# Patient Record
Sex: MALE | Race: WHITE | Employment: OTHER
[De-identification: names, ages, dates, MRNs, and addresses within clinical notes are randomized per-mention and may not be internally consistent; named-entity substitution may affect disease eponyms.]

---

## 2017-01-30 ENCOUNTER — OFFICE VISIT (OUTPATIENT)
Dept: INTERNAL MEDICINE | Facility: CLINIC | Age: 42
End: 2017-01-30

## 2017-01-30 VITALS
DIASTOLIC BLOOD PRESSURE: 64 MMHG | OXYGEN SATURATION: 96 % | BODY MASS INDEX: 32.78 KG/M2 | SYSTOLIC BLOOD PRESSURE: 124 MMHG | WEIGHT: 229 LBS | HEIGHT: 70 IN | HEART RATE: 94 BPM

## 2017-01-30 DIAGNOSIS — F31.9 BIPOLAR 1 DISORDER (HCC): Chronic | ICD-10-CM

## 2017-01-30 DIAGNOSIS — Z00.00 HEALTH CARE MAINTENANCE: ICD-10-CM

## 2017-01-30 DIAGNOSIS — L03.032: Primary | ICD-10-CM

## 2017-01-30 DIAGNOSIS — M51.26 DISPLACEMENT OF LUMBAR INTERVERTEBRAL DISC WITHOUT MYELOPATHY: ICD-10-CM

## 2017-01-30 DIAGNOSIS — M79.675 TOE PAIN, LEFT: ICD-10-CM

## 2017-01-30 PROCEDURE — G8427 DOCREV CUR MEDS BY ELIG CLIN: HCPCS | Performed by: NURSE PRACTITIONER

## 2017-01-30 PROCEDURE — 99213 OFFICE O/P EST LOW 20 MIN: CPT | Performed by: NURSE PRACTITIONER

## 2017-01-30 PROCEDURE — G8484 FLU IMMUNIZE NO ADMIN: HCPCS | Performed by: NURSE PRACTITIONER

## 2017-01-30 PROCEDURE — 4004F PT TOBACCO SCREEN RCVD TLK: CPT | Performed by: NURSE PRACTITIONER

## 2017-01-30 PROCEDURE — G8419 CALC BMI OUT NRM PARAM NOF/U: HCPCS | Performed by: NURSE PRACTITIONER

## 2017-01-30 RX ORDER — TERBINAFINE HYDROCHLORIDE 250 MG/1
250 TABLET ORAL DAILY
Qty: 90 TABLET | Refills: 0 | Status: SHIPPED | OUTPATIENT
Start: 2017-01-30 | End: 2017-01-30 | Stop reason: CLARIF

## 2017-01-30 ASSESSMENT — PATIENT HEALTH QUESTIONNAIRE - PHQ9
2. FEELING DOWN, DEPRESSED OR HOPELESS: 0
SUM OF ALL RESPONSES TO PHQ9 QUESTIONS 1 & 2: 1
1. LITTLE INTEREST OR PLEASURE IN DOING THINGS: 1
SUM OF ALL RESPONSES TO PHQ QUESTIONS 1-9: 1

## 2017-01-30 ASSESSMENT — ENCOUNTER SYMPTOMS: BACK PAIN: 1

## 2017-02-16 ENCOUNTER — HOSPITAL ENCOUNTER (OUTPATIENT)
Dept: PAIN MANAGEMENT | Age: 42
Discharge: HOME | End: 2017-02-16
Attending: ANESTHESIOLOGY | Admitting: ANESTHESIOLOGY
Payer: MEDICARE

## 2017-02-16 VITALS — DIASTOLIC BLOOD PRESSURE: 78 MMHG | HEART RATE: 98 BPM | SYSTOLIC BLOOD PRESSURE: 138 MMHG

## 2017-02-16 DIAGNOSIS — M51.26 DISPLACEMENT OF LUMBAR INTERVERTEBRAL DISC WITHOUT MYELOPATHY: ICD-10-CM

## 2017-02-16 DIAGNOSIS — R52 PAIN DISORDER: ICD-10-CM

## 2017-02-16 PROCEDURE — 99214 OFFICE O/P EST MOD 30 MIN: CPT

## 2017-02-16 RX ORDER — OXYCODONE HYDROCHLORIDE 5 MG/1
5 TABLET ORAL EVERY 8 HOURS PRN
Qty: 90 TABLET | Refills: 0 | Status: SHIPPED | OUTPATIENT
Start: 2017-02-20 | End: 2017-03-20 | Stop reason: SDUPTHER

## 2017-02-16 RX ORDER — GABAPENTIN 300 MG/1
300 CAPSULE ORAL EVERY 8 HOURS SCHEDULED
Qty: 90 CAPSULE | Refills: 0 | Status: SHIPPED | OUTPATIENT
Start: 2017-02-16 | End: 2017-03-20 | Stop reason: SDUPTHER

## 2017-02-16 RX ORDER — TERBINAFINE HYDROCHLORIDE 250 MG/1
250 TABLET ORAL DAILY
COMMUNITY
End: 2017-05-16

## 2017-02-16 RX ORDER — TIZANIDINE 4 MG/1
4 TABLET ORAL 2 TIMES DAILY
Qty: 60 TABLET | Refills: 0 | Status: SHIPPED | OUTPATIENT
Start: 2017-02-16 | End: 2017-02-17 | Stop reason: DRUGHIGH

## 2017-02-16 ASSESSMENT — PAIN SCALES - GENERAL: PAINLEVEL_OUTOF10: 3

## 2017-02-16 ASSESSMENT — PAIN DESCRIPTION - ORIENTATION: ORIENTATION: LOWER

## 2017-02-16 ASSESSMENT — PAIN DESCRIPTION - DESCRIPTORS: DESCRIPTORS: ACHING;BURNING;CONSTANT;STABBING

## 2017-02-16 ASSESSMENT — PAIN DESCRIPTION - ONSET: ONSET: GRADUAL

## 2017-02-16 ASSESSMENT — PAIN DESCRIPTION - PAIN TYPE: TYPE: CHRONIC PAIN

## 2017-02-16 ASSESSMENT — PAIN DESCRIPTION - FREQUENCY: FREQUENCY: CONTINUOUS

## 2017-02-16 ASSESSMENT — PAIN DESCRIPTION - PROGRESSION: CLINICAL_PROGRESSION: GRADUALLY IMPROVING

## 2017-02-16 ASSESSMENT — PAIN DESCRIPTION - LOCATION: LOCATION: BACK

## 2017-02-17 RX ORDER — TIZANIDINE 4 MG/1
4 TABLET ORAL 2 TIMES DAILY PRN
Qty: 60 TABLET | Refills: 0 | Status: SHIPPED
Start: 2017-02-17 | End: 2017-02-17 | Stop reason: DRUGHIGH

## 2017-02-17 RX ORDER — TIZANIDINE 4 MG/1
4 TABLET ORAL 2 TIMES DAILY PRN
Qty: 60 TABLET | Refills: 0 | Status: SHIPPED
Start: 2017-02-17 | End: 2017-04-19 | Stop reason: SDUPTHER

## 2017-03-20 ENCOUNTER — HOSPITAL ENCOUNTER (OUTPATIENT)
Dept: PAIN MANAGEMENT | Age: 42
Discharge: HOME | End: 2017-03-20
Payer: MEDICARE

## 2017-03-20 VITALS
BODY MASS INDEX: 31.5 KG/M2 | HEART RATE: 84 BPM | WEIGHT: 220 LBS | DIASTOLIC BLOOD PRESSURE: 80 MMHG | HEIGHT: 70 IN | RESPIRATION RATE: 20 BRPM | SYSTOLIC BLOOD PRESSURE: 124 MMHG | TEMPERATURE: 98.2 F

## 2017-03-20 DIAGNOSIS — M51.26 DISPLACEMENT OF LUMBAR INTERVERTEBRAL DISC WITHOUT MYELOPATHY: ICD-10-CM

## 2017-03-20 DIAGNOSIS — R52 PAIN DISORDER: ICD-10-CM

## 2017-03-20 PROCEDURE — 99214 OFFICE O/P EST MOD 30 MIN: CPT

## 2017-03-20 RX ORDER — GABAPENTIN 300 MG/1
300 CAPSULE ORAL EVERY 8 HOURS SCHEDULED
Qty: 90 CAPSULE | Refills: 0 | Status: SHIPPED | OUTPATIENT
Start: 2017-03-20 | End: 2017-04-19 | Stop reason: SDUPTHER

## 2017-03-20 RX ORDER — OXYCODONE HYDROCHLORIDE 5 MG/1
5 TABLET ORAL EVERY 8 HOURS PRN
Qty: 90 TABLET | Refills: 0 | Status: SHIPPED | OUTPATIENT
Start: 2017-03-22 | End: 2017-04-19 | Stop reason: SDUPTHER

## 2017-03-20 ASSESSMENT — PAIN DESCRIPTION - PAIN TYPE: TYPE: CHRONIC PAIN

## 2017-03-20 ASSESSMENT — PAIN DESCRIPTION - ORIENTATION: ORIENTATION: LOWER

## 2017-03-20 ASSESSMENT — PAIN DESCRIPTION - PROGRESSION: CLINICAL_PROGRESSION: NOT CHANGED

## 2017-03-20 ASSESSMENT — PAIN SCALES - GENERAL: PAINLEVEL_OUTOF10: 3

## 2017-03-20 ASSESSMENT — PAIN DESCRIPTION - FREQUENCY: FREQUENCY: CONTINUOUS

## 2017-03-20 ASSESSMENT — PAIN DESCRIPTION - LOCATION: LOCATION: BACK

## 2017-03-20 ASSESSMENT — PAIN DESCRIPTION - DESCRIPTORS: DESCRIPTORS: ACHING;BURNING;CONSTANT;RADIATING

## 2017-03-20 ASSESSMENT — PAIN DESCRIPTION - ONSET: ONSET: ON-GOING

## 2017-04-19 ENCOUNTER — HOSPITAL ENCOUNTER (OUTPATIENT)
Dept: PAIN MANAGEMENT | Age: 42
Discharge: HOME | End: 2017-04-19
Payer: MEDICARE

## 2017-04-19 VITALS
DIASTOLIC BLOOD PRESSURE: 74 MMHG | TEMPERATURE: 98 F | HEIGHT: 70 IN | SYSTOLIC BLOOD PRESSURE: 136 MMHG | BODY MASS INDEX: 32.21 KG/M2 | RESPIRATION RATE: 18 BRPM | HEART RATE: 80 BPM | WEIGHT: 225 LBS

## 2017-04-19 DIAGNOSIS — M51.26 DISPLACEMENT OF LUMBAR INTERVERTEBRAL DISC WITHOUT MYELOPATHY: ICD-10-CM

## 2017-04-19 DIAGNOSIS — R52 PAIN DISORDER: ICD-10-CM

## 2017-04-19 PROCEDURE — 99214 OFFICE O/P EST MOD 30 MIN: CPT

## 2017-04-19 RX ORDER — TIZANIDINE 4 MG/1
4 TABLET ORAL 2 TIMES DAILY PRN
Qty: 60 TABLET | Refills: 0 | Status: SHIPPED | OUTPATIENT
Start: 2017-04-19 | End: 2017-04-19 | Stop reason: ALTCHOICE

## 2017-04-19 RX ORDER — OXYCODONE HYDROCHLORIDE 5 MG/1
5 TABLET ORAL EVERY 8 HOURS PRN
Qty: 90 TABLET | Refills: 0 | Status: SHIPPED | OUTPATIENT
Start: 2017-04-21 | End: 2017-05-16 | Stop reason: SDUPTHER

## 2017-04-19 RX ORDER — GABAPENTIN 300 MG/1
300 CAPSULE ORAL EVERY 8 HOURS SCHEDULED
Qty: 90 CAPSULE | Refills: 0 | Status: SHIPPED | OUTPATIENT
Start: 2017-04-21 | End: 2017-05-16 | Stop reason: SDUPTHER

## 2017-04-19 RX ORDER — CYCLOBENZAPRINE HCL 10 MG
10 TABLET ORAL 2 TIMES DAILY PRN
Qty: 60 TABLET | Refills: 6 | Status: SHIPPED | OUTPATIENT
Start: 2017-04-19 | End: 2017-06-19 | Stop reason: SDUPTHER

## 2017-04-19 ASSESSMENT — ENCOUNTER SYMPTOMS
COUGH: 0
SHORTNESS OF BREATH: 0
CONSTIPATION: 0
BACK PAIN: 1

## 2017-04-19 ASSESSMENT — PAIN DESCRIPTION - DESCRIPTORS: DESCRIPTORS: BURNING;ACHING;CONSTANT;SHARP

## 2017-04-19 ASSESSMENT — PAIN DESCRIPTION - ONSET: ONSET: ON-GOING

## 2017-04-19 ASSESSMENT — PAIN SCALES - GENERAL: PAINLEVEL_OUTOF10: 5

## 2017-04-19 ASSESSMENT — PAIN DESCRIPTION - PAIN TYPE: TYPE: CHRONIC PAIN

## 2017-04-19 ASSESSMENT — PAIN DESCRIPTION - PROGRESSION: CLINICAL_PROGRESSION: GRADUALLY WORSENING

## 2017-04-19 ASSESSMENT — PAIN DESCRIPTION - ORIENTATION: ORIENTATION: LOWER

## 2017-04-19 ASSESSMENT — PAIN DESCRIPTION - LOCATION: LOCATION: BACK

## 2017-05-16 ENCOUNTER — HOSPITAL ENCOUNTER (OUTPATIENT)
Dept: PAIN MANAGEMENT | Age: 42
Discharge: HOME | End: 2017-05-16
Payer: MEDICARE

## 2017-05-16 VITALS
RESPIRATION RATE: 18 BRPM | TEMPERATURE: 99.1 F | HEART RATE: 114 BPM | SYSTOLIC BLOOD PRESSURE: 131 MMHG | DIASTOLIC BLOOD PRESSURE: 70 MMHG

## 2017-05-16 DIAGNOSIS — R52 PAIN DISORDER: ICD-10-CM

## 2017-05-16 DIAGNOSIS — M51.26 DISPLACEMENT OF LUMBAR INTERVERTEBRAL DISC WITHOUT MYELOPATHY: ICD-10-CM

## 2017-05-16 PROCEDURE — 99214 OFFICE O/P EST MOD 30 MIN: CPT

## 2017-05-16 RX ORDER — OXYCODONE HYDROCHLORIDE 5 MG/1
5 TABLET ORAL EVERY 8 HOURS PRN
Qty: 90 TABLET | Refills: 0 | Status: SHIPPED | OUTPATIENT
Start: 2017-05-21 | End: 2017-06-19 | Stop reason: SDUPTHER

## 2017-05-16 RX ORDER — GABAPENTIN 300 MG/1
300 CAPSULE ORAL EVERY 8 HOURS SCHEDULED
Qty: 90 CAPSULE | Refills: 0 | Status: SHIPPED | OUTPATIENT
Start: 2017-05-21 | End: 2017-06-19 | Stop reason: SDUPTHER

## 2017-05-16 ASSESSMENT — PAIN DESCRIPTION - FREQUENCY: FREQUENCY: CONTINUOUS

## 2017-05-16 ASSESSMENT — PAIN DESCRIPTION - PROGRESSION: CLINICAL_PROGRESSION: NOT CHANGED

## 2017-05-16 ASSESSMENT — PAIN DESCRIPTION - LOCATION: LOCATION: BACK

## 2017-05-16 ASSESSMENT — PAIN DESCRIPTION - ONSET: ONSET: ON-GOING

## 2017-05-16 ASSESSMENT — PAIN DESCRIPTION - PAIN TYPE: TYPE: CHRONIC PAIN

## 2017-05-16 ASSESSMENT — PAIN DESCRIPTION - DIRECTION: RADIATING_TOWARDS: PAIN LOW BACK NONRADIATING

## 2017-05-16 ASSESSMENT — PAIN SCALES - GENERAL: PAINLEVEL_OUTOF10: 2

## 2017-05-16 ASSESSMENT — PAIN DESCRIPTION - ORIENTATION: ORIENTATION: LOWER

## 2017-06-19 ENCOUNTER — HOSPITAL ENCOUNTER (OUTPATIENT)
Dept: PAIN MANAGEMENT | Age: 42
Discharge: HOME | End: 2017-06-19
Payer: MEDICARE

## 2017-06-19 VITALS
RESPIRATION RATE: 18 BRPM | TEMPERATURE: 98.2 F | SYSTOLIC BLOOD PRESSURE: 121 MMHG | DIASTOLIC BLOOD PRESSURE: 84 MMHG | HEART RATE: 83 BPM

## 2017-06-19 DIAGNOSIS — R52 PAIN DISORDER: ICD-10-CM

## 2017-06-19 DIAGNOSIS — M51.26 DISPLACEMENT OF LUMBAR INTERVERTEBRAL DISC WITHOUT MYELOPATHY: ICD-10-CM

## 2017-06-19 PROCEDURE — 99215 OFFICE O/P EST HI 40 MIN: CPT

## 2017-06-19 PROCEDURE — 80307 DRUG TEST PRSMV CHEM ANLYZR: CPT

## 2017-06-19 RX ORDER — CYCLOBENZAPRINE HCL 10 MG
10 TABLET ORAL 2 TIMES DAILY PRN
Qty: 60 TABLET | Refills: 6 | Status: SHIPPED | OUTPATIENT
Start: 2017-06-20 | End: 2017-07-14 | Stop reason: SDUPTHER

## 2017-06-19 RX ORDER — GABAPENTIN 300 MG/1
300 CAPSULE ORAL EVERY 8 HOURS SCHEDULED
Qty: 90 CAPSULE | Refills: 0 | Status: SHIPPED | OUTPATIENT
Start: 2017-06-20 | End: 2017-07-14 | Stop reason: SDUPTHER

## 2017-06-19 RX ORDER — OXYCODONE HYDROCHLORIDE 5 MG/1
5 TABLET ORAL EVERY 8 HOURS PRN
Qty: 90 TABLET | Refills: 0 | Status: SHIPPED | OUTPATIENT
Start: 2017-06-20 | End: 2017-07-14 | Stop reason: SDUPTHER

## 2017-06-19 ASSESSMENT — PAIN DESCRIPTION - LOCATION: LOCATION: BACK

## 2017-06-19 ASSESSMENT — PAIN DESCRIPTION - PAIN TYPE: TYPE: CHRONIC PAIN

## 2017-06-19 ASSESSMENT — PAIN DESCRIPTION - PROGRESSION: CLINICAL_PROGRESSION: NOT CHANGED

## 2017-06-19 ASSESSMENT — ENCOUNTER SYMPTOMS
BACK PAIN: 1
SHORTNESS OF BREATH: 0
CONSTIPATION: 0
COUGH: 0

## 2017-06-19 ASSESSMENT — PAIN DESCRIPTION - ORIENTATION: ORIENTATION: LOWER

## 2017-06-19 ASSESSMENT — PAIN DESCRIPTION - ONSET: ONSET: ON-GOING

## 2017-06-19 ASSESSMENT — PAIN DESCRIPTION - FREQUENCY: FREQUENCY: CONTINUOUS

## 2017-06-19 ASSESSMENT — PAIN SCALES - GENERAL: PAINLEVEL_OUTOF10: 3

## 2017-06-23 LAB
6-ACETYLMORPHINE, UR: NOT DETECTED
7-AMINOCLONAZEPAM, URINE: NOT DETECTED
ALPHA-OH-ALPRAZ, URINE: NOT DETECTED
ALPRAZOLAM, URINE: NOT DETECTED
AMPHETAMINES, URINE: NOT DETECTED
BARBITURATES, URINE: NOT DETECTED
BENZOYLECGONINE, UR: NOT DETECTED
BUPRENORPHINE URINE: NOT DETECTED
CARISOPRODOL, UR: NOT DETECTED
CLONAZEPAM, URINE: NOT DETECTED
CODEINE, URINE: NOT DETECTED
CREATININE URINE: 294.4 MG/DL (ref 20–400)
DIAZEPAM, URINE: NOT DETECTED
EER PAIN MGT DRUG PANEL, HIGH RES/EMIT U: NORMAL
ETHYL GLUCURONIDE UR: NOT DETECTED
FENTANYL URINE: NOT DETECTED
HYDROCODONE, URINE: NOT DETECTED
HYDROMORPHONE, URINE: NOT DETECTED
LORAZEPAM, URINE: NOT DETECTED
MARIJUANA METAB, UR: NOT DETECTED
MDA, UR: NOT DETECTED
MDEA, EVE, UR: NOT DETECTED
MDMA URINE: NOT DETECTED
MEPERIDINE METAB, UR: NOT DETECTED
METHADONE, URINE: NOT DETECTED
METHAMPHETAMINE, URINE: NOT DETECTED
METHYLPHENIDATE: NOT DETECTED
MIDAZOLAM, URINE: NOT DETECTED
MORPHINE URINE: NOT DETECTED
NORBUPRENORPHINE, URINE: NOT DETECTED
NORDIAZEPAM, URINE: NOT DETECTED
NORFENTANYL, URINE: NOT DETECTED
NORHYDROCODONE, URINE: NOT DETECTED
NOROXYCODONE, URINE: PRESENT
NOROXYMORPHONE, URINE: PRESENT
OXAZEPAM, URINE: NOT DETECTED
OXYCODONE URINE: PRESENT
OXYMORPHONE, URINE: PRESENT
PAIN MGT DRUG PANEL, HI RES, UR: NORMAL
PCP,URINE: NOT DETECTED
PHENTERMINE, UR: NOT DETECTED
PROPOXYPHENE, URINE: NOT DETECTED
TAPENTADOL, URINE: NOT DETECTED
TAPENTADOL-O-SULFATE, URINE: NOT DETECTED
TEMAZEPAM, URINE: NOT DETECTED
TRAMADOL, URINE: NOT DETECTED
ZOLPIDEM, URINE: NOT DETECTED

## 2017-07-13 ENCOUNTER — OFFICE VISIT (OUTPATIENT)
Dept: INTERNAL MEDICINE | Age: 42
End: 2017-07-13
Payer: MEDICARE

## 2017-07-13 VITALS
HEART RATE: 95 BPM | SYSTOLIC BLOOD PRESSURE: 132 MMHG | DIASTOLIC BLOOD PRESSURE: 82 MMHG | HEIGHT: 70 IN | WEIGHT: 211 LBS | BODY MASS INDEX: 30.21 KG/M2

## 2017-07-13 DIAGNOSIS — Z11.4 SCREENING FOR HIV (HUMAN IMMUNODEFICIENCY VIRUS): ICD-10-CM

## 2017-07-13 DIAGNOSIS — E78.2 MIXED HYPERLIPIDEMIA: ICD-10-CM

## 2017-07-13 DIAGNOSIS — N18.30 CKD (CHRONIC KIDNEY DISEASE) STAGE 3, GFR 30-59 ML/MIN (HCC): Primary | ICD-10-CM

## 2017-07-13 PROCEDURE — 99213 OFFICE O/P EST LOW 20 MIN: CPT | Performed by: INTERNAL MEDICINE

## 2017-07-13 PROCEDURE — 99213 OFFICE O/P EST LOW 20 MIN: CPT

## 2017-07-13 RX ORDER — ATORVASTATIN CALCIUM 40 MG/1
40 TABLET, FILM COATED ORAL DAILY
Qty: 30 TABLET | Refills: 3 | Status: SHIPPED | OUTPATIENT
Start: 2017-07-13 | End: 2018-02-22 | Stop reason: SDUPTHER

## 2017-07-14 ENCOUNTER — HOSPITAL ENCOUNTER (OUTPATIENT)
Dept: PAIN MANAGEMENT | Age: 42
Discharge: HOME | End: 2017-07-14
Payer: MEDICARE

## 2017-07-14 VITALS
HEIGHT: 70 IN | DIASTOLIC BLOOD PRESSURE: 95 MMHG | BODY MASS INDEX: 30.21 KG/M2 | WEIGHT: 211 LBS | RESPIRATION RATE: 16 BRPM | SYSTOLIC BLOOD PRESSURE: 135 MMHG | HEART RATE: 105 BPM | TEMPERATURE: 98.1 F

## 2017-07-14 DIAGNOSIS — M51.26 DISPLACEMENT OF LUMBAR INTERVERTEBRAL DISC WITHOUT MYELOPATHY: ICD-10-CM

## 2017-07-14 DIAGNOSIS — R52 PAIN DISORDER: ICD-10-CM

## 2017-07-14 PROCEDURE — 99214 OFFICE O/P EST MOD 30 MIN: CPT

## 2017-07-14 RX ORDER — OXYCODONE HYDROCHLORIDE 5 MG/1
5 TABLET ORAL EVERY 8 HOURS PRN
Qty: 90 TABLET | Refills: 0 | Status: SHIPPED | OUTPATIENT
Start: 2017-07-26 | End: 2017-08-16 | Stop reason: SDUPTHER

## 2017-07-14 RX ORDER — GABAPENTIN 300 MG/1
300 CAPSULE ORAL EVERY 8 HOURS SCHEDULED
Qty: 90 CAPSULE | Refills: 0 | Status: SHIPPED | OUTPATIENT
Start: 2017-07-20 | End: 2017-08-16 | Stop reason: SDUPTHER

## 2017-07-14 RX ORDER — CYCLOBENZAPRINE HCL 10 MG
10 TABLET ORAL 2 TIMES DAILY PRN
Qty: 60 TABLET | Refills: 6 | Status: SHIPPED | OUTPATIENT
Start: 2017-07-20 | End: 2017-08-19

## 2017-07-14 ASSESSMENT — ENCOUNTER SYMPTOMS
CONSTIPATION: 0
BACK PAIN: 1
SHORTNESS OF BREATH: 0
COUGH: 0

## 2017-07-14 ASSESSMENT — PAIN DESCRIPTION - LOCATION: LOCATION: BACK;BUTTOCKS;HIP

## 2017-07-14 ASSESSMENT — PAIN DESCRIPTION - PROGRESSION: CLINICAL_PROGRESSION: GRADUALLY WORSENING

## 2017-07-14 ASSESSMENT — PAIN DESCRIPTION - DESCRIPTORS: DESCRIPTORS: ACHING;BURNING;CONSTANT;SHARP;STABBING

## 2017-07-14 ASSESSMENT — PAIN SCALES - GENERAL: PAINLEVEL_OUTOF10: 7

## 2017-07-14 ASSESSMENT — PAIN DESCRIPTION - PAIN TYPE: TYPE: CHRONIC PAIN

## 2017-07-14 ASSESSMENT — PAIN DESCRIPTION - ORIENTATION: ORIENTATION: LOWER;MID

## 2017-07-26 ENCOUNTER — HOSPITAL ENCOUNTER (OUTPATIENT)
Dept: ULTRASOUND IMAGING | Age: 42
Discharge: HOME | End: 2017-07-26
Payer: MEDICARE

## 2017-07-26 DIAGNOSIS — N18.30 CKD (CHRONIC KIDNEY DISEASE) STAGE 3, GFR 30-59 ML/MIN (HCC): ICD-10-CM

## 2017-07-26 PROCEDURE — 76770 US EXAM ABDO BACK WALL COMP: CPT

## 2017-08-07 ENCOUNTER — HOSPITAL ENCOUNTER (OUTPATIENT)
Age: 42
Setting detail: SPECIMEN
Discharge: HOME | End: 2017-08-07
Payer: MEDICARE

## 2017-08-07 DIAGNOSIS — Z11.4 SCREENING FOR HIV (HUMAN IMMUNODEFICIENCY VIRUS): ICD-10-CM

## 2017-08-07 DIAGNOSIS — N18.30 CKD (CHRONIC KIDNEY DISEASE) STAGE 3, GFR 30-59 ML/MIN (HCC): ICD-10-CM

## 2017-08-07 LAB
BILIRUBIN URINE: NEGATIVE
COLOR: YELLOW
COMMENT UA: NORMAL
COMPLEMENT C3: 150 MG/DL (ref 90–180)
COMPLEMENT C4: 31 MG/DL (ref 10–40)
CREATININE URINE: 164.5 MG/DL (ref 39–259)
GLUCOSE URINE: NEGATIVE
HEPATITIS B SURFACE ANTIGEN: NONREACTIVE
HEPATITIS C ANTIBODY: NONREACTIVE
HIV AG/AB: NONREACTIVE
KETONES, URINE: NEGATIVE
LEUKOCYTE ESTERASE, URINE: NEGATIVE
NITRITE, URINE: NEGATIVE
PH UA: 6 (ref 5–8)
PROTEIN UA: NEGATIVE
RHEUMATOID FACTOR: <10 IU/ML
SPECIFIC GRAVITY UA: 1.01 (ref 1–1.03)
TOTAL PROTEIN, URINE: 12 MG/DL
TURBIDITY: CLEAR
URINE HGB: NEGATIVE
UROBILINOGEN, URINE: NORMAL

## 2017-08-07 PROCEDURE — 83516 IMMUNOASSAY NONANTIBODY: CPT

## 2017-08-07 PROCEDURE — 84156 ASSAY OF PROTEIN URINE: CPT

## 2017-08-07 PROCEDURE — 82570 ASSAY OF URINE CREATININE: CPT

## 2017-08-07 PROCEDURE — 84155 ASSAY OF PROTEIN SERUM: CPT

## 2017-08-07 PROCEDURE — 86038 ANTINUCLEAR ANTIBODIES: CPT

## 2017-08-07 PROCEDURE — 36415 COLL VENOUS BLD VENIPUNCTURE: CPT

## 2017-08-07 PROCEDURE — 87340 HEPATITIS B SURFACE AG IA: CPT

## 2017-08-07 PROCEDURE — 84165 PROTEIN E-PHORESIS SERUM: CPT

## 2017-08-07 PROCEDURE — 81003 URINALYSIS AUTO W/O SCOPE: CPT

## 2017-08-07 PROCEDURE — 84166 PROTEIN E-PHORESIS/URINE/CSF: CPT

## 2017-08-07 PROCEDURE — 86803 HEPATITIS C AB TEST: CPT

## 2017-08-07 PROCEDURE — 86431 RHEUMATOID FACTOR QUANT: CPT

## 2017-08-07 PROCEDURE — 86160 COMPLEMENT ANTIGEN: CPT

## 2017-08-07 PROCEDURE — 87389 HIV-1 AG W/HIV-1&-2 AB AG IA: CPT

## 2017-08-08 LAB
ALBUMIN (CALCULATED): 4.9 G/DL (ref 3.2–5.2)
ALBUMIN PERCENT: 64 % (ref 45–65)
ALPHA 1 PERCENT: 3 % (ref 3–6)
ALPHA 2 PERCENT: 11 % (ref 6–13)
ALPHA-1-GLOBULIN: 0.2 G/DL (ref 0.1–0.4)
ALPHA-2-GLOBULIN: 0.8 G/DL (ref 0.5–0.9)
ANCA MYELOPEROXIDASE: 15 AU/ML
ANCA PROTEINASE 3: 6 AU/ML
ANTI-NUCLEAR ANTIBODY (ANA): NEGATIVE
BETA GLOBULIN: 0.8 G/DL (ref 0.5–1.1)
BETA PERCENT: 11 % (ref 11–19)
GAMMA GLOBULIN %: 11 % (ref 9–20)
GAMMA GLOBULIN: 0.8 G/DL (ref 0.5–1.5)
PATHOLOGIST: NORMAL
PROTEIN ELECTROPHORESIS, SERUM: NORMAL
TOTAL PROT. SUM,%: 100 % (ref 98–102)
TOTAL PROT. SUM: 7.5 G/DL (ref 6.3–8.2)
TOTAL PROTEIN: 7.6 G/DL (ref 6.4–8.3)

## 2017-08-09 LAB
P E INTERPRETATION, U: NORMAL
PATHOLOGIST: NORMAL
SPECIMEN TYPE: NORMAL
URINE TOTAL PROTEIN: 12 MG/DL

## 2017-08-16 ENCOUNTER — HOSPITAL ENCOUNTER (OUTPATIENT)
Dept: PAIN MANAGEMENT | Age: 42
Discharge: HOME | End: 2017-08-16
Payer: MEDICARE

## 2017-08-16 VITALS
RESPIRATION RATE: 16 BRPM | DIASTOLIC BLOOD PRESSURE: 84 MMHG | HEART RATE: 101 BPM | TEMPERATURE: 98.2 F | SYSTOLIC BLOOD PRESSURE: 145 MMHG

## 2017-08-16 DIAGNOSIS — M51.26 DISPLACEMENT OF LUMBAR INTERVERTEBRAL DISC WITHOUT MYELOPATHY: ICD-10-CM

## 2017-08-16 DIAGNOSIS — R52 PAIN DISORDER: ICD-10-CM

## 2017-08-16 PROCEDURE — 99214 OFFICE O/P EST MOD 30 MIN: CPT

## 2017-08-16 RX ORDER — GABAPENTIN 300 MG/1
300 CAPSULE ORAL EVERY 8 HOURS SCHEDULED
Qty: 90 CAPSULE | Refills: 0 | Status: SHIPPED | OUTPATIENT
Start: 2017-08-19 | End: 2017-09-22 | Stop reason: SDUPTHER

## 2017-08-16 RX ORDER — OXYCODONE HYDROCHLORIDE 5 MG/1
5 TABLET ORAL EVERY 8 HOURS PRN
Qty: 80 TABLET | Refills: 0 | Status: SHIPPED | OUTPATIENT
Start: 2017-08-29 | End: 2017-09-22 | Stop reason: SDUPTHER

## 2017-08-16 RX ORDER — OXYCODONE HYDROCHLORIDE 5 MG/1
5 TABLET ORAL EVERY 8 HOURS PRN
Qty: 75 TABLET | Refills: 0 | Status: SHIPPED | OUTPATIENT
Start: 2017-08-29 | End: 2017-08-16 | Stop reason: SDUPTHER

## 2017-08-16 ASSESSMENT — ENCOUNTER SYMPTOMS
BOWEL INCONTINENCE: 0
BACK PAIN: 1
CONSTIPATION: 0
SHORTNESS OF BREATH: 0
COUGH: 0

## 2017-09-22 ENCOUNTER — HOSPITAL ENCOUNTER (OUTPATIENT)
Dept: PAIN MANAGEMENT | Age: 42
Discharge: HOME | End: 2017-09-22
Payer: MEDICARE

## 2017-09-22 VITALS
DIASTOLIC BLOOD PRESSURE: 79 MMHG | HEART RATE: 103 BPM | RESPIRATION RATE: 16 BRPM | SYSTOLIC BLOOD PRESSURE: 120 MMHG | TEMPERATURE: 99.2 F

## 2017-09-22 DIAGNOSIS — R52 PAIN DISORDER: ICD-10-CM

## 2017-09-22 DIAGNOSIS — M51.26 DISPLACEMENT OF LUMBAR INTERVERTEBRAL DISC WITHOUT MYELOPATHY: ICD-10-CM

## 2017-09-22 PROCEDURE — 99213 OFFICE O/P EST LOW 20 MIN: CPT

## 2017-09-22 PROCEDURE — 99214 OFFICE O/P EST MOD 30 MIN: CPT

## 2017-09-22 PROCEDURE — 99214 OFFICE O/P EST MOD 30 MIN: CPT | Performed by: NURSE PRACTITIONER

## 2017-09-22 RX ORDER — GABAPENTIN 300 MG/1
300 CAPSULE ORAL EVERY 8 HOURS SCHEDULED
Qty: 90 CAPSULE | Refills: 0 | Status: SHIPPED | OUTPATIENT
Start: 2017-09-22 | End: 2017-10-19 | Stop reason: SDUPTHER

## 2017-09-22 RX ORDER — OXYCODONE HYDROCHLORIDE 5 MG/1
5 TABLET ORAL EVERY 8 HOURS PRN
Qty: 75 TABLET | Refills: 0 | Status: SHIPPED | OUTPATIENT
Start: 2017-09-28 | End: 2017-10-19 | Stop reason: SDUPTHER

## 2017-09-22 ASSESSMENT — PAIN DESCRIPTION - FREQUENCY: FREQUENCY: CONTINUOUS

## 2017-09-22 ASSESSMENT — PAIN DESCRIPTION - PROGRESSION: CLINICAL_PROGRESSION: GRADUALLY WORSENING

## 2017-09-22 ASSESSMENT — PAIN DESCRIPTION - ONSET: ONSET: ON-GOING

## 2017-09-22 ASSESSMENT — PAIN SCALES - GENERAL: PAINLEVEL_OUTOF10: 2

## 2017-09-22 ASSESSMENT — PAIN DESCRIPTION - LOCATION: LOCATION: BACK

## 2017-09-22 ASSESSMENT — PAIN DESCRIPTION - ORIENTATION: ORIENTATION: RIGHT;LEFT;LOWER

## 2017-09-22 ASSESSMENT — PAIN DESCRIPTION - PAIN TYPE: TYPE: CHRONIC PAIN

## 2017-09-22 ASSESSMENT — ENCOUNTER SYMPTOMS
COUGH: 0
BACK PAIN: 1
BOWEL INCONTINENCE: 0
CONSTIPATION: 0
SHORTNESS OF BREATH: 0

## 2017-09-22 ASSESSMENT — PAIN DESCRIPTION - DESCRIPTORS: DESCRIPTORS: CONSTANT;ACHING;BURNING

## 2017-10-09 ENCOUNTER — HOSPITAL ENCOUNTER (OUTPATIENT)
Dept: MRI IMAGING | Age: 42
Discharge: HOME | End: 2017-10-09
Payer: MEDICARE

## 2017-10-09 DIAGNOSIS — M51.26 DISPLACEMENT OF LUMBAR INTERVERTEBRAL DISC WITHOUT MYELOPATHY: ICD-10-CM

## 2017-10-09 PROCEDURE — 72148 MRI LUMBAR SPINE W/O DYE: CPT

## 2017-10-19 ENCOUNTER — OFFICE VISIT (OUTPATIENT)
Dept: INTERNAL MEDICINE | Age: 42
End: 2017-10-19
Payer: MEDICARE

## 2017-10-19 ENCOUNTER — HOSPITAL ENCOUNTER (OUTPATIENT)
Dept: PAIN MANAGEMENT | Age: 42
Discharge: HOME | End: 2017-10-19
Payer: MEDICARE

## 2017-10-19 VITALS
WEIGHT: 220 LBS | HEART RATE: 105 BPM | HEIGHT: 70 IN | DIASTOLIC BLOOD PRESSURE: 83 MMHG | BODY MASS INDEX: 31.5 KG/M2 | RESPIRATION RATE: 16 BRPM | TEMPERATURE: 98.7 F | SYSTOLIC BLOOD PRESSURE: 167 MMHG

## 2017-10-19 VITALS
SYSTOLIC BLOOD PRESSURE: 140 MMHG | HEART RATE: 104 BPM | WEIGHT: 220 LBS | BODY MASS INDEX: 31.57 KG/M2 | DIASTOLIC BLOOD PRESSURE: 60 MMHG

## 2017-10-19 DIAGNOSIS — R52 PAIN DISORDER: ICD-10-CM

## 2017-10-19 DIAGNOSIS — M43.02 CERVICAL SPONDYLOLYSIS: Primary | ICD-10-CM

## 2017-10-19 DIAGNOSIS — N18.30 STAGE 3 CHRONIC KIDNEY DISEASE (HCC): ICD-10-CM

## 2017-10-19 DIAGNOSIS — M54.12 BRACHIAL NEURITIS: Primary | ICD-10-CM

## 2017-10-19 DIAGNOSIS — K59.01 SLOW TRANSIT CONSTIPATION: ICD-10-CM

## 2017-10-19 DIAGNOSIS — M51.26 DISPLACEMENT OF LUMBAR INTERVERTEBRAL DISC WITHOUT MYELOPATHY: ICD-10-CM

## 2017-10-19 DIAGNOSIS — F17.200 SMOKING: ICD-10-CM

## 2017-10-19 PROCEDURE — G8484 FLU IMMUNIZE NO ADMIN: HCPCS | Performed by: INTERNAL MEDICINE

## 2017-10-19 PROCEDURE — 99214 OFFICE O/P EST MOD 30 MIN: CPT | Performed by: PAIN MEDICINE

## 2017-10-19 PROCEDURE — 99214 OFFICE O/P EST MOD 30 MIN: CPT

## 2017-10-19 PROCEDURE — G8427 DOCREV CUR MEDS BY ELIG CLIN: HCPCS | Performed by: INTERNAL MEDICINE

## 2017-10-19 PROCEDURE — 99212 OFFICE O/P EST SF 10 MIN: CPT

## 2017-10-19 PROCEDURE — 99213 OFFICE O/P EST LOW 20 MIN: CPT | Performed by: INTERNAL MEDICINE

## 2017-10-19 PROCEDURE — 4004F PT TOBACCO SCREEN RCVD TLK: CPT | Performed by: INTERNAL MEDICINE

## 2017-10-19 PROCEDURE — G8417 CALC BMI ABV UP PARAM F/U: HCPCS | Performed by: INTERNAL MEDICINE

## 2017-10-19 RX ORDER — DOCUSATE SODIUM 100 MG/1
100 CAPSULE, LIQUID FILLED ORAL DAILY PRN
Qty: 60 CAPSULE | Refills: 3 | Status: SHIPPED | OUTPATIENT
Start: 2017-10-19

## 2017-10-19 RX ORDER — GABAPENTIN 300 MG/1
300 CAPSULE ORAL EVERY 8 HOURS SCHEDULED
Qty: 90 CAPSULE | Refills: 0 | Status: SHIPPED | OUTPATIENT
Start: 2017-10-22 | End: 2017-11-17 | Stop reason: SDUPTHER

## 2017-10-19 RX ORDER — CYCLOBENZAPRINE HCL 10 MG
10 TABLET ORAL 2 TIMES DAILY PRN
COMMUNITY
End: 2019-09-10 | Stop reason: SINTOL

## 2017-10-19 RX ORDER — OXYCODONE HYDROCHLORIDE 5 MG/1
5 TABLET ORAL EVERY 8 HOURS PRN
Qty: 75 TABLET | Refills: 0 | Status: SHIPPED | OUTPATIENT
Start: 2017-10-28 | End: 2017-11-17 | Stop reason: SDUPTHER

## 2017-10-19 ASSESSMENT — ENCOUNTER SYMPTOMS
ABDOMINAL PAIN: 0
BACK PAIN: 1
HEMOPTYSIS: 0
COUGH: 0
BLURRED VISION: 0
BACK PAIN: 1
HEARTBURN: 0
SHORTNESS OF BREATH: 0
VOMITING: 0
DOUBLE VISION: 0
UNUSUAL HAIR DISTRIBUTION: 0
WHEEZING: 0
CONSTIPATION: 1
WHEEZING: 0
SORE THROAT: 0
BLURRED VISION: 0
SUSPICIOUS LESIONS: 0
EYE DISCHARGE: 0
NAUSEA: 0
SPUTUM PRODUCTION: 0

## 2017-10-19 NOTE — PROGRESS NOTES
Attending Physician Statement  I have discussed the care of Antony Rowan, including pertinent history and exam findings with the resident. I have reviewed the key elements of all parts of the encounter and discussed them with the resident. Added history includes hx of LBP and he is on lots of meds and sees pain mgt and is on percs with constipation so we will rx this.,  He also has HBP and this is ok on rx and he has new neck pain with radiation to his L 5th finger and some loss of strength into that arm. Added exam findings include BP is ok and strength is ok clinically and reflexes are normal. I agree with the assessment, and status of the problem list as documented. The plan and orders should include he should discuss next stepsr e his neck with pain mgt. We could get imaging to allow them to consider shots or refer to NS and this was also documented by the resident. I agree with the referral to Pain mgt and he will see them today and can discuss with them whether to proceed to imaging shots or surgery. The medication list was reviewed with the resident and is up to date. The return visit should be in 3 months .     Roxy Shannon

## 2017-10-19 NOTE — PROGRESS NOTES
tablet 0    atorvastatin (LIPITOR) 40 MG tablet Take 1 tablet by mouth daily 30 tablet 3    lidocaine (LMX) 4 % cream Apply topically as needed for Pain Apply topically as needed.  acetaminophen (TYLENOL) 325 MG tablet Take 650 mg by mouth as needed for Pain.  QUEtiapine (SEROQUEL XR) 300 MG XR tablet Take 300 mg by mouth nightly. Two tabs  Q HS       No current facility-administered medications for this visit. Social History   Substance Use Topics    Smoking status: Current Every Day Smoker     Packs/day: 1.00     Years: 22.00     Types: Cigarettes    Smokeless tobacco: Former User     Types: Snuff     Quit date: 7/26/1991      Comment: 1/2 to 1 ppd    Alcohol use No       Family History   Problem Relation Age of Onset    Breast Cancer Mother     Liver Cancer Mother     Other Father      brain cysts        REVIEW OF SYSTEMS:  Review of Systems   Constitutional: Negative for chills, fever, malaise/fatigue and weight loss. HENT: Negative for congestion and sore throat. Eyes: Negative for blurred vision and double vision. Respiratory: Negative for cough, sputum production, shortness of breath and wheezing. Cardiovascular: Negative for chest pain, palpitations, leg swelling and PND. Gastrointestinal: Positive for constipation. Negative for abdominal pain, heartburn, nausea and vomiting. Genitourinary: Negative for dysuria, frequency and urgency. Musculoskeletal: Positive for back pain (with b/l sciatica) and neck pain (radiates to back of head and left arm with tingling sensation and weakness with weakness in hand. ). Negative for falls, joint pain and myalgias. Neurological: Positive for tingling (in left arm in left little and ring finger. ). Negative for dizziness, focal weakness and weakness. Psychiatric/Behavioral: Negative for depression and suicidal ideas.        PHYSICAL EXAM:  Vitals:    10/19/17 0941 10/19/17 1031   BP: (!) 144/78 (!) 140/60   Site: Left Arm Right are worsening and depending on pain mgmt recommendations, will get required imaging with x-ray c-spine of MRI c-spine. Stage 3 chronic kidney disease    Slow transit constipation  -     docusate sodium (COLACE) 100 MG capsule; Take 1 capsule by mouth daily as needed for Constipation    Smoking    Other orders  -     Cancel: XR CERVICAL SPINE (2-3 VIEWS); Future      FOLLOW UP AND INSTRUCTIONS:  Return in about 1 month (around 11/19/2017). · Israel Ferris received counseling on the following healthy behaviors: nutrition, exercise, medication adherence and tobacco cessation    · Discussed use, benefit, and side effects of prescribed medications. Barriers to medication compliance addressed. All patient questions answered. Pt voiced understanding. · Patient given educational materials - see patient instructions    Joana Driver MD,   PGY-3 Internal Medicine Resident. [facility].   10/19/2017  12:49 PM

## 2017-10-23 ENCOUNTER — HOSPITAL ENCOUNTER (OUTPATIENT)
Dept: PAIN MANAGEMENT | Age: 42
Discharge: HOME | End: 2017-10-23
Payer: MEDICARE

## 2017-10-23 VITALS
BODY MASS INDEX: 31.5 KG/M2 | TEMPERATURE: 97.8 F | DIASTOLIC BLOOD PRESSURE: 76 MMHG | WEIGHT: 220 LBS | RESPIRATION RATE: 16 BRPM | SYSTOLIC BLOOD PRESSURE: 114 MMHG | HEIGHT: 70 IN | HEART RATE: 85 BPM | OXYGEN SATURATION: 96 %

## 2017-10-23 DIAGNOSIS — M54.9 CHRONIC BACK PAIN, UNSPECIFIED BACK LOCATION, UNSPECIFIED BACK PAIN LATERALITY: ICD-10-CM

## 2017-10-23 DIAGNOSIS — G89.29 CHRONIC BACK PAIN, UNSPECIFIED BACK LOCATION, UNSPECIFIED BACK PAIN LATERALITY: ICD-10-CM

## 2017-10-23 PROCEDURE — 2580000003 HC RX 258: Performed by: ANESTHESIOLOGY

## 2017-10-23 PROCEDURE — 6360000002 HC RX W HCPCS: Performed by: ANESTHESIOLOGY

## 2017-10-23 PROCEDURE — 6360000002 HC RX W HCPCS

## 2017-10-23 PROCEDURE — 62323 NJX INTERLAMINAR LMBR/SAC: CPT

## 2017-10-23 PROCEDURE — 2500000003 HC RX 250 WO HCPCS

## 2017-10-23 PROCEDURE — 62323 NJX INTERLAMINAR LMBR/SAC: CPT | Performed by: ANESTHESIOLOGY

## 2017-10-23 RX ORDER — FENTANYL CITRATE 50 UG/ML
50 INJECTION, SOLUTION INTRAMUSCULAR; INTRAVENOUS PRN
Status: DISCONTINUED | OUTPATIENT
Start: 2017-10-23 | End: 2017-10-24 | Stop reason: HOSPADM

## 2017-10-23 RX ORDER — SODIUM CHLORIDE, SODIUM LACTATE, POTASSIUM CHLORIDE, CALCIUM CHLORIDE 600; 310; 30; 20 MG/100ML; MG/100ML; MG/100ML; MG/100ML
500 INJECTION, SOLUTION INTRAVENOUS CONTINUOUS
Status: DISCONTINUED | OUTPATIENT
Start: 2017-10-23 | End: 2017-10-24 | Stop reason: HOSPADM

## 2017-10-23 RX ORDER — MIDAZOLAM HYDROCHLORIDE 1 MG/ML
0.5 INJECTION INTRAMUSCULAR; INTRAVENOUS 4 TIMES DAILY PRN
Status: DISCONTINUED | OUTPATIENT
Start: 2017-10-23 | End: 2017-10-24 | Stop reason: HOSPADM

## 2017-10-23 RX ORDER — LIDOCAINE HYDROCHLORIDE 10 MG/ML
0.3 INJECTION, SOLUTION EPIDURAL; INFILTRATION; INTRACAUDAL; PERINEURAL ONCE
Status: DISCONTINUED | OUTPATIENT
Start: 2017-10-23 | End: 2017-10-24 | Stop reason: HOSPADM

## 2017-10-23 RX ADMIN — MIDAZOLAM HYDROCHLORIDE 1 MG: 1 INJECTION, SOLUTION INTRAMUSCULAR; INTRAVENOUS at 10:27

## 2017-10-23 RX ADMIN — FENTANYL CITRATE 50 MCG: 50 INJECTION INTRAMUSCULAR; INTRAVENOUS at 10:26

## 2017-10-23 RX ADMIN — SODIUM CHLORIDE, POTASSIUM CHLORIDE, SODIUM LACTATE AND CALCIUM CHLORIDE 500 ML: 600; 310; 30; 20 INJECTION, SOLUTION INTRAVENOUS at 10:18

## 2017-10-23 ASSESSMENT — PAIN SCALES - GENERAL
PAINLEVEL_OUTOF10: 0

## 2017-10-23 ASSESSMENT — PAIN - FUNCTIONAL ASSESSMENT
PAIN_FUNCTIONAL_ASSESSMENT: 0-10

## 2017-10-23 ASSESSMENT — PAIN DESCRIPTION - DESCRIPTORS: DESCRIPTORS: ACHING;BURNING;CONSTANT

## 2017-10-24 NOTE — PROCEDURES
in the pain clinic. I  anticipate discharge later this afternoon. His ASA and Mallampati  classifications are both 2.         Helen Bowers    D: 10/23/2017 10:52:02       T: 10/23/2017 13:32:39     [email or web address]  Job#: [id number]     Doc#: [id number]      CC:    Maurice Gilmore MD

## 2017-11-17 ENCOUNTER — HOSPITAL ENCOUNTER (OUTPATIENT)
Dept: PAIN MANAGEMENT | Age: 42
Discharge: HOME | End: 2017-11-17
Payer: MEDICARE

## 2017-11-17 VITALS
DIASTOLIC BLOOD PRESSURE: 81 MMHG | HEIGHT: 70 IN | TEMPERATURE: 97.3 F | SYSTOLIC BLOOD PRESSURE: 129 MMHG | WEIGHT: 225 LBS | BODY MASS INDEX: 32.21 KG/M2 | RESPIRATION RATE: 16 BRPM | HEART RATE: 80 BPM

## 2017-11-17 DIAGNOSIS — M51.26 DISPLACEMENT OF LUMBAR INTERVERTEBRAL DISC WITHOUT MYELOPATHY: Primary | ICD-10-CM

## 2017-11-17 DIAGNOSIS — R52 PAIN DISORDER: ICD-10-CM

## 2017-11-17 PROCEDURE — 99214 OFFICE O/P EST MOD 30 MIN: CPT

## 2017-11-17 PROCEDURE — 99213 OFFICE O/P EST LOW 20 MIN: CPT | Performed by: NURSE PRACTITIONER

## 2017-11-17 RX ORDER — OXYCODONE HYDROCHLORIDE 5 MG/1
5 TABLET ORAL EVERY 8 HOURS PRN
Qty: 70 TABLET | Refills: 0 | Status: SHIPPED | OUTPATIENT
Start: 2017-12-01 | End: 2017-12-20 | Stop reason: SDUPTHER

## 2017-11-17 RX ORDER — GABAPENTIN 300 MG/1
300 CAPSULE ORAL EVERY 8 HOURS SCHEDULED
Qty: 90 CAPSULE | Refills: 0 | Status: SHIPPED | OUTPATIENT
Start: 2017-11-17 | End: 2017-12-20 | Stop reason: SDUPTHER

## 2017-11-17 ASSESSMENT — ENCOUNTER SYMPTOMS
BACK PAIN: 1
COUGH: 0
CONSTIPATION: 0
SHORTNESS OF BREATH: 0

## 2017-11-21 ENCOUNTER — HOSPITAL ENCOUNTER (OUTPATIENT)
Dept: PHYSICAL THERAPY | Age: 42
Setting detail: THERAPIES SERIES
Discharge: HOME | End: 2017-11-21
Payer: MEDICARE

## 2017-11-21 PROCEDURE — G8979 MOBILITY GOAL STATUS: HCPCS

## 2017-11-21 PROCEDURE — 97110 THERAPEUTIC EXERCISES: CPT

## 2017-11-21 PROCEDURE — 97162 PT EVAL MOD COMPLEX 30 MIN: CPT

## 2017-11-21 PROCEDURE — G8978 MOBILITY CURRENT STATUS: HCPCS

## 2017-11-21 NOTE — CONSULTS
back pain-displaced lumbar disc &lumar stenosis; bipolar, Depression, sciatica L LE, fibula fracture, gastric bypass,               [x] Refer to full medical chart  In EPIC   Tests: [x] X-Ray: [] MRI:  [] Other:    Medications: [x] Refer to full medical record [] None [] Other:  Allergies:      [x] Refer to full medical record [] None [x] Other: Celebrex, Penicillin    Function:  Hand Dominance  [x] Right  [] Left-ambidextrous  Working:  [] Normal Duty  [] Light Duty  [] Off D/T Condition  [] Retired  [] Not Employed                  [x]  Disability-bipolar w/psychotic tendencies, depression, chroic fx/sprain L ankle, LBP        [] Other:           Return to work:   Job/ADL Description:  Pain:  [x] Yes  [] No Location: neck, L hand Pain Rating: (0-10 scale) 2/10  Pain altered Tx:  [] Yes  [x] No  Action:  Symptoms:  [] Improving [x] Worsening [x] Same  Better:  [] AM    [] PM    [] Sit    [] Rise/Sit    []Stand    [] Walk    [] Lying    [] Other:  Worse: [] AM    [] PM    [] Sit    [] Rise/Sit    []Stand    [] Walk    [x] Lying    [] Bend                             [] Valsalva    [] Other:  Sleep: [x] OK    [] Disturbed-takes meds-flexeril, gabapentin every night, as needed takes Oxycodin almost always at night     Objective:      STRENGTH  STRENGTH  ROM    Left Right  Left Right Cervical    C5 Shld Abd 3+p 4+ L1-2 Hip Flex   Flexion 29p   Shld Flexion 4-p 5 Hip Abd   Extension 12pp \"resistance\"   Shld IR   L3-4 Knee Ext   Rotation L40* R50*   Shld ER   L4 Ankle DF   Sidebend L 15 R15*   C6 Elb Flex 4 5 L5 EHL   Retraction    C7 Elb Ext 4 5 S1 Plant.  Flex   Lumbar    C8 EPL 4+ 4+ Abdominals   Flexion    T1 Fing Abd 3+ 4- Erector Spinae   Extension     68, 65, 57 118, 91, 92    Rotation L  R   Pinch  16,15,14 20,16,18    Sidebend L R   Lat Pinch 27, 18, 18 25,23,25    UE/LE     9 hole pegboard 27.75 23.43sec                                                       B shoulder flex, abd WNL  *grinding and tension, mAmin  [] Aquatic Therapy   [x] Dry Needling [] Other:    []  Medication allergies reviewed for use of    Dexamethasone Sodium Phosphate 4mg/ml     with iontophoresis treatments. Pt is not allergic. Frequency:  2 x/week for 12 visits    Todays Treatment:  Modalities: add Cerv traction next Rx  Precautions: L ankle instability, chronic fracture  Exercises: Cerv/L UE  Exercise Reps/ Time Weight/ Level Comments   Seated       Shrugs  15x     Post Shoulder Rolls 15x     Scap Retractions  10x 3sec          Man cerv traction 5 min  Decreases L UE symptoms   Other: Initiated ex per above log to increase circulation to neck and shoulders. Educated Pt in ex for [employer], issued handout. Pt reports has Tens unit at home. Specific Instructions for next treatment: progress cerv/postural ex, begin cerv traction consider HP ES    Treatment Charges: Mins Units   [] Evaluation       []  Low       [x]  Moderate       []  High 42  1   []  Modalities     [x]  Ther Exercise 9 1   [x]  Manual Therapy 5 --   []  Ther Activities     []  Aquatics     []  Vasocompression     []  Other       TOTAL TREATMENT TIME: 56 min    Time in: 1300      Time out: 1402    Electronically signed by: Gela Lake PT             Physician Signature:________________________________Date:__________________  By signing above or cosigning this note, I have reviewed this plan of care and certify a need for medically necessary rehabilitation services.      *PLEASE SIGN ABOVE AND FAX BACK ALL PAGES*

## 2017-11-27 ENCOUNTER — HOSPITAL ENCOUNTER (OUTPATIENT)
Dept: GENERAL RADIOLOGY | Age: 42
Discharge: HOME | End: 2017-11-27
Payer: MEDICARE

## 2017-11-27 ENCOUNTER — HOSPITAL ENCOUNTER (OUTPATIENT)
Age: 42
Discharge: HOME | End: 2017-11-27
Payer: MEDICARE

## 2017-11-27 ENCOUNTER — HOSPITAL ENCOUNTER (OUTPATIENT)
Dept: PHYSICAL THERAPY | Age: 42
Setting detail: THERAPIES SERIES
Discharge: HOME | End: 2017-11-27
Payer: MEDICARE

## 2017-11-27 DIAGNOSIS — M54.12 BRACHIAL NEURITIS: ICD-10-CM

## 2017-11-27 PROCEDURE — 97110 THERAPEUTIC EXERCISES: CPT

## 2017-11-27 PROCEDURE — 72020 X-RAY EXAM OF SPINE 1 VIEW: CPT

## 2017-11-27 PROCEDURE — 97012 MECHANICAL TRACTION THERAPY: CPT

## 2017-11-27 NOTE — FLOWSHEET NOTE
[x] [facility]       Outpatient Physical Therapy       [address]       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 Felt good after first therapy session, has been doing exercises he was given. Pain:  [x] Yes  [] No Location: neck and left shoulder Pain Rating: (0-10 scale) 2/10  Pain altered Tx:  [x] No  [] Yes  Action:  Comments:    Objective:  Modalities: Mechanical cervical traction (disc invovelment without muscle guarding protocol) 16 lbs max/8 lbs min x 15 minutes  Precautions:  Exercises:  Exercise Reps/ Time Weight/ Level Comments   Cervical AROM 10 ea 5 sec Rotation and side bending   Cervical retraction 10 5 sec    Upper trap stretch  5 ea 10 sec    Levator stretch  5 ea 10 sec    Shoulder shrugs 10     Posterior shoulder rolls 10     Scapular retractions 10     Hand gripper 20 30 lbs                Other:  Educated in cervical AROM exercises and stretches for upper traps and levator scapular. Issued written handout of exercises and postural education. Specific Instructions for next treatment:add strengthening exercises for  and pinch    Treatment Charges: Mins Units   []  Modalities     [x]  Ther Exercise 29 2   []  Manual Therapy     []  Ther Activities     []  Aquatics     []  Vasocompression     [x]  Other-traction 17 1   Total Treatment time         Assessment: [x] Progressing toward goals. Pt demonstrates good understanding of exercises and education given. After traction, pt reportsed pain in left upper trap and numbness and tingling in left arm/hand was gone    [] No change. [] Other:    STG: (to be met in 12 treatments)  1. ? Pain: Neck, L UE 4/10 at worst  2. ? ROM: Cerv flex 40°, ext 20°, B SB 25°   3. ? Strength: L shoulder 4/5, elbow 4+/5, finger abd 4-/5,  80 lbs, pinch 18lbs, lat pinch 22lbs-to improve ability to lift items and play guitar  4. ? Function:Optimal Score 44% loss of function, 9 hole peg test 26 seconds to indicate increased coordination   5. Independent with Home Exercise Programs  6. Demonstrate Knowledge of fall prevention       Pt. Education:  [x] Yes  [] No  [x] Reviewed Prior HEP/Ed  Method of Education: [x] Verbal  [x] Demo  [x] Written-cervcial ex, posture ed  Comprehension of Education:  [] Verbalizes understanding. [x] Demonstrates understanding. [] Needs review. [x] Demonstrates/verbalizes HEP/Ed previously given. Plan: [x] Continue per plan of care.    [] Other:      Time In: 9:45           Time Out: 10:31    Electronically signed by:  Jon Soulier, PTA

## 2017-11-30 ENCOUNTER — OFFICE VISIT (OUTPATIENT)
Dept: INTERNAL MEDICINE | Age: 42
End: 2017-11-30
Payer: MEDICARE

## 2017-11-30 VITALS
WEIGHT: 219 LBS | SYSTOLIC BLOOD PRESSURE: 122 MMHG | HEIGHT: 70 IN | BODY MASS INDEX: 31.35 KG/M2 | DIASTOLIC BLOOD PRESSURE: 79 MMHG | HEART RATE: 102 BPM

## 2017-11-30 DIAGNOSIS — Z13.1 DIABETES MELLITUS SCREENING: ICD-10-CM

## 2017-11-30 DIAGNOSIS — E78.2 MIXED HYPERLIPIDEMIA: ICD-10-CM

## 2017-11-30 DIAGNOSIS — Z23 NEED FOR PROPHYLACTIC VACCINATION AGAINST STREPTOCOCCUS PNEUMONIAE (PNEUMOCOCCUS): ICD-10-CM

## 2017-11-30 DIAGNOSIS — Z23 NEEDS FLU SHOT: ICD-10-CM

## 2017-11-30 DIAGNOSIS — Z23 NEED FOR PROPHYLACTIC VACCINATION WITH STREPTOCOCCUS PNEUMONIAE (PNEUMOCOCCUS) AND INFLUENZA VACCINES: ICD-10-CM

## 2017-11-30 DIAGNOSIS — N18.30 CKD (CHRONIC KIDNEY DISEASE) STAGE 3, GFR 30-59 ML/MIN (HCC): Primary | ICD-10-CM

## 2017-11-30 PROCEDURE — 4004F PT TOBACCO SCREEN RCVD TLK: CPT | Performed by: INTERNAL MEDICINE

## 2017-11-30 PROCEDURE — 99213 OFFICE O/P EST LOW 20 MIN: CPT | Performed by: INTERNAL MEDICINE

## 2017-11-30 PROCEDURE — G8417 CALC BMI ABV UP PARAM F/U: HCPCS | Performed by: INTERNAL MEDICINE

## 2017-11-30 PROCEDURE — G8484 FLU IMMUNIZE NO ADMIN: HCPCS | Performed by: INTERNAL MEDICINE

## 2017-11-30 PROCEDURE — G8427 DOCREV CUR MEDS BY ELIG CLIN: HCPCS | Performed by: INTERNAL MEDICINE

## 2017-11-30 PROCEDURE — 90688 IIV4 VACCINE SPLT 0.5 ML IM: CPT

## 2017-11-30 PROCEDURE — 90732 PPSV23 VACC 2 YRS+ SUBQ/IM: CPT

## 2017-11-30 PROCEDURE — 99213 OFFICE O/P EST LOW 20 MIN: CPT

## 2017-11-30 ASSESSMENT — ENCOUNTER SYMPTOMS
DOUBLE VISION: 0
COUGH: 0
BLURRED VISION: 0
CONSTIPATION: 0
WHEEZING: 0
SPUTUM PRODUCTION: 0
HEMOPTYSIS: 0
ABDOMINAL PAIN: 0
HEARTBURN: 0
VOMITING: 0
NAUSEA: 0
SHORTNESS OF BREATH: 0
BACK PAIN: 1

## 2017-11-30 NOTE — PATIENT INSTRUCTIONS
Return To Clinic 3/8/18. After Visit Summary given and reviewed. bb    It is very important for your care that you keep your appointment. If for some reason you are unable to keep your appointment it is equally important that you call our office at [phone] to cancel your appointment and reschedule. Failure to do so may result in your termination from our practice. LABORATORY INSTRUCTIONS    Your doctor has ordered blood or urine testing. You can get this testing done at the Lab located on the first floor of the [facility], or at any other [facility]. Please stop at Main Registration, before going to the lab, as you must be registered first.     Please get this lab done before your next visit. You may NOT eat, drink, smoke, or chew anything before this test for 8 hours. You may still have water.

## 2017-11-30 NOTE — PROGRESS NOTES
tremors, weakness and headaches. Psychiatric/Behavioral: Negative for depression, substance abuse and suicidal ideas. PHYSICAL EXAM:  Vitals:    11/30/17 1047   BP: 122/79   Site: Right Arm   Position: Sitting   Cuff Size: Medium Adult   Pulse: 102   Weight: 219 lb (99.3 kg)   Height: 5' 10\" (1.778 m)     BP Readings from Last 3 Encounters:   11/30/17 122/79   11/17/17 129/81   10/23/17 114/76        Physical Exam   Constitutional: He is oriented to person, place, and time and well-developed, well-nourished, and in no distress. HENT:   Head: Normocephalic and atraumatic. Eyes: Conjunctivae are normal. Pupils are equal, round, and reactive to light. Neck: Neck supple. No thyromegaly present. Cardiovascular: Normal heart sounds and intact distal pulses. No murmur heard. Pulmonary/Chest: Effort normal and breath sounds normal. No respiratory distress. He has no wheezes. He has no rales. Abdominal: Soft. Bowel sounds are normal. He exhibits no distension. There is no tenderness. Musculoskeletal: Normal range of motion. He exhibits tenderness (low back and upper thoracic spine ). He exhibits no edema or deformity. Neurological: He is alert and oriented to person, place, and time. Gait normal.   Skin: Skin is warm. No erythema.    Psychiatric: Affect and judgment normal.         DIAGNOSTIC FINDINGS:  CBC:  Lab Results   Component Value Date    WBC 11.8 01/31/2017    HGB 15.5 01/31/2017     01/31/2017       BMP:    Lab Results   Component Value Date     01/31/2017    K 3.9 01/31/2017     01/31/2017    CO2 23 01/31/2017    BUN 20 01/31/2017    CREATININE 1.54 01/31/2017    GLUCOSE 120 01/31/2017       HEMOGLOBIN A1C: No results found for: LABA1C    FASTING LIPID PANEL:  Lab Results   Component Value Date    CHOL 199 01/31/2017    HDL 27 (L) 01/31/2017    TRIG 220 (H) 01/31/2017       ASSESSMENT AND PLAN:  Nola Araujo was seen today for 1 month follow-up and chronic kidney

## 2017-12-01 ENCOUNTER — APPOINTMENT (OUTPATIENT)
Dept: GENERAL RADIOLOGY | Age: 42
End: 2017-12-01
Payer: MEDICARE

## 2017-12-01 ENCOUNTER — HOSPITAL ENCOUNTER (EMERGENCY)
Age: 42
Discharge: HOME | End: 2017-12-01
Attending: EMERGENCY MEDICINE
Payer: MEDICARE

## 2017-12-01 ENCOUNTER — TELEPHONE (OUTPATIENT)
Dept: INTERNAL MEDICINE CLINIC | Age: 42
End: 2017-12-01

## 2017-12-01 VITALS
SYSTOLIC BLOOD PRESSURE: 147 MMHG | OXYGEN SATURATION: 98 % | RESPIRATION RATE: 16 BRPM | DIASTOLIC BLOOD PRESSURE: 92 MMHG | WEIGHT: 219 LBS | TEMPERATURE: 97.3 F | BODY MASS INDEX: 31.42 KG/M2 | HEART RATE: 112 BPM

## 2017-12-01 DIAGNOSIS — M65.9 SYNOVITIS OF FOOT: Primary | ICD-10-CM

## 2017-12-01 LAB
C-REACTIVE PROTEIN: 7.5 MG/L (ref 0–5)
URIC ACID: 7.3 MG/DL (ref 3.4–7)

## 2017-12-01 PROCEDURE — 99284 EMERGENCY DEPT VISIT MOD MDM: CPT

## 2017-12-01 PROCEDURE — 73630 X-RAY EXAM OF FOOT: CPT

## 2017-12-01 PROCEDURE — 84550 ASSAY OF BLOOD/URIC ACID: CPT

## 2017-12-01 PROCEDURE — 86140 C-REACTIVE PROTEIN: CPT

## 2017-12-01 PROCEDURE — 6370000000 HC RX 637 (ALT 250 FOR IP): Performed by: EMERGENCY MEDICINE

## 2017-12-01 RX ORDER — PREDNISONE 10 MG/1
TABLET ORAL
Qty: 20 TABLET | Refills: 0 | Status: SHIPPED | OUTPATIENT
Start: 2017-12-01 | End: 2017-12-11

## 2017-12-01 RX ORDER — OXYCODONE HYDROCHLORIDE 5 MG/1
5 TABLET ORAL ONCE
Status: COMPLETED | OUTPATIENT
Start: 2017-12-01 | End: 2017-12-01

## 2017-12-01 RX ADMIN — OXYCODONE HYDROCHLORIDE 5 MG: 5 TABLET ORAL at 09:01

## 2017-12-01 ASSESSMENT — PAIN SCALES - GENERAL
PAINLEVEL_OUTOF10: 4
PAINLEVEL_OUTOF10: 8
PAINLEVEL_OUTOF10: 8

## 2017-12-01 ASSESSMENT — ENCOUNTER SYMPTOMS
COUGH: 0
NAUSEA: 0
COLOR CHANGE: 1
SHORTNESS OF BREATH: 0
VOMITING: 0

## 2017-12-01 ASSESSMENT — PAIN DESCRIPTION - ONSET: ONSET: AWAKENED FROM SLEEP

## 2017-12-01 ASSESSMENT — PAIN DESCRIPTION - ORIENTATION: ORIENTATION: RIGHT

## 2017-12-01 ASSESSMENT — PAIN DESCRIPTION - LOCATION: LOCATION: FOOT;TOE (COMMENT WHICH ONE)

## 2017-12-01 ASSESSMENT — PAIN DESCRIPTION - FREQUENCY: FREQUENCY: CONTINUOUS

## 2017-12-01 ASSESSMENT — PAIN DESCRIPTION - PROGRESSION: CLINICAL_PROGRESSION: GRADUALLY WORSENING

## 2017-12-01 ASSESSMENT — PAIN DESCRIPTION - PAIN TYPE: TYPE: ACUTE PAIN

## 2017-12-01 ASSESSMENT — PAIN DESCRIPTION - DESCRIPTORS: DESCRIPTORS: SHARP;ACHING;THROBBING

## 2017-12-01 NOTE — ED NOTES
Spoke with lab about results.  from chemistry states that it will be at least one more hour for results.      Pham Mayen RN  12/01/17 3524

## 2017-12-01 NOTE — ED PROVIDER NOTES
[facility]  ED  Emergency Department Encounter  Emergency Medicine Resident     Pt Name: Wilmer Luo  MRN: [id number]  [place] 1975  Date of evaluation: 12/1/17  PCP:  Sherita Richardson MD    [address] [place] [address]       Chief Complaint   Patient presents with    Foot Pain    Foot Swelling       HISTORY OF PRESENT ILLNESS  (Location/Symptom, Timing/Onset, Context/Setting, Quality, Duration, Modifying Factors, Severity.)      Wilmer Luo is a 38 y.o. male who presents with Foot pain. Patient states he has pain, swelling, redness to the first MTP joint on his right foot. Patient states that the pain started yesterday, and denies history of current symptoms. Patient states that he does have a history of arthritis, and does take Percocet at home which has not relieved the pain. Patient states that the pain is exacerbated by standing, walking, or movement of the first digit. Patient denies fever, chills, recent falls or injury, right ankle pain or decreased range of motion, history of gout. PAST MEDICAL / SURGICAL / SOCIAL / FAMILY HISTORY      has a past medical history of Back pain, chronic; Bipolar 1 disorder ([address] Bipolar 2 disorder ([address] Bronchitis; Degenerative disc disease; Depression; Displacement of lumbar intervertebral disc without myelopathy; Fibula fracture; History of gastric ulcer; History of suicidal tendencies; Lumbar pain with radiation down both legs; Lumbar stenosis; Non-union of fracture; Osteoarthritis; Pneumonia; Sleep disturbance; Spider bite; Spondylosis; and Tenosynovitis of ankle. has a past surgical history that includes Adenoidectomy; Tympanostomy tube placement; Urethra dilation; Nerve Block (8/8/13); Nerve Block (4-24-14); Nerve Block (12/19/2014); Nerve Block (7/23/15); Nerve Block (11/19/15); Upper gastrointestinal endoscopy; Nerve Block (3-10-16); Nerve Block (12/12/2016); and Nerve Block (10/23/2017).     Social History     Social History  Marital status:      Spouse name: N/A    Number of children: N/A    Years of education: N/A     Occupational History    Not on file. Social History Main Topics    Smoking status: Current Every Day Smoker     Packs/day: 1.00     Years: 22.00     Types: Cigarettes    Smokeless tobacco: Former User     Types: Snuff     Quit date: 7/26/1991      Comment: 1/2 to 1 ppd    Alcohol use No    Drug use: No    Sexual activity: Not on file     Other Topics Concern    Not on file     Social History Narrative    No narrative on file       Family History   Problem Relation Age of Onset   [place] Breast Cancer Mother     Liver Cancer Mother     Other Father      brain cysts       Allergies:  Celebrex [celecoxib] and Penicillins    Home Medications:  Prior to Admission medications    Medication Sig Start Date End Date Taking? Authorizing Provider   predniSONE (DELTASONE) 10 MG tablet Take 4 tablets by mouth once daily for 5 days 12/1/17 12/11/17 Yes Uri Helms DO   gabapentin (NEURONTIN) 300 MG capsule Take 1 capsule by mouth every 8 hours 11/17/17 12/17/17  Kanika Rubio CNP   oxyCODONE (ROXICODONE) 5 MG immediate release tablet Take 1 tablet by mouth every 8 hours as needed for Pain . Earliest Fill Date: 12/1/17 12/1/17 12/31/17  Kanika Rubio CNP   cyclobenzaprine (FLEXERIL) 10 MG tablet Take 10 mg by mouth 2 times daily as needed for Muscle spasms    Historical Provider, MD   docusate sodium (COLACE) 100 MG capsule Take 1 capsule by mouth daily as needed for Constipation 10/19/17   Emma Mishra MD   atorvastatin (LIPITOR) 40 MG tablet Take 1 tablet by mouth daily 7/13/17   Carol Ann Mcwilliams MD   lidocaine (LMX) 4 % cream Apply topically as needed for Pain Apply topically as needed. Historical Provider, MD   acetaminophen (TYLENOL) 325 MG tablet Take 650 mg by mouth as needed for Pain.     Historical Provider, MD   QUEtiapine (SEROQUEL XR) 300 MG XR tablet Take 300 mg by mouth nightly. Two tabs  Q HS    Historical Provider, MD       REVIEW OF SYSTEMS    (2-9 systems for level 4, 10 or more for level 5)      Review of Systems   Constitutional: Negative for chills and fever. Respiratory: Negative for cough and shortness of breath. Cardiovascular: Negative for chest pain and leg swelling. Gastrointestinal: Negative for nausea and vomiting. Genitourinary: Negative for dysuria and hematuria. Musculoskeletal: Positive for arthralgias and gait problem. Skin: Positive for color change. Negative for rash and wound. Neurological: Negative for weakness and numbness. Psychiatric/Behavioral: Negative for self-injury and suicidal ideas. PHYSICAL EXAM   (up to 7 for level 4, 8 or more for level 5)      INITIAL VITALS:   BP (!) 147/92   Pulse 112   Temp 97.3 °F (36.3 °C) (Oral)   Resp 16   Wt 219 lb (99.3 kg)   SpO2 98%   BMI 31.42 kg/m²     Physical Exam   Constitutional: He is oriented to person, place, and time. He appears well-developed and well-nourished. HENT:   Head: Normocephalic and atraumatic. Right Ear: Tympanic membrane normal.   Left Ear: Tympanic membrane normal.   Eyes: EOM are normal. No scleral icterus. Neck: Normal range of motion. Neck supple. Cardiovascular: Normal rate, regular rhythm, normal heart sounds and intact distal pulses. Exam reveals no gallop and no friction rub. No murmur heard. The refill less than 2 seconds on all 5 digits of right foot. 2+ DP and PT pulses of right lower extremity. Pulmonary/Chest: Effort normal and breath sounds normal. No respiratory distress. He has no wheezes. He has no rales. Abdominal: Soft. Bowel sounds are normal. He exhibits no distension and no mass. There is no tenderness. There is no rebound and no guarding. Musculoskeletal: Normal range of motion. He exhibits no deformity. Slight erythema, tenderness, edema noted over the first MTP joint of right foot.    Neurological: He is alert and oriented to person, place, and time. No sensory deficit. GCS eye subscore is 4. GCS verbal subscore is 5. GCS motor subscore is 6. Range of motion of the first digit of right foot intact. Skin: Skin is warm and dry. No rash noted. He is not diaphoretic. Psychiatric: He expresses no homicidal and no suicidal ideation. Nursing note and vitals reviewed. DIFFERENTIAL  DIAGNOSIS     PLAN (LABS / IMAGING / EKG):  Orders Placed This Encounter   Procedures    XR FOOT RIGHT (MIN 3 VIEWS)    C-REACTIVE PROTEIN    URIC ACID    Inpatient consult to Primary Care Provider       MEDICATIONS ORDERED:  Orders Placed This Encounter   Medications    oxyCODONE (ROXICODONE) immediate release tablet 5 mg    predniSONE (DELTASONE) 10 MG tablet     Sig: Take 4 tablets by mouth once daily for 5 days     Dispense:  20 tablet     Refill:  0       DDX: Gout, pseudogout, septic arthritis, osteoarthritis    DIAGNOSTIC RESULTS / EMERGENCY DEPARTMENT COURSE / MDM     LABS:  Results for orders placed or performed during the hospital encounter of 12/01/17   C-REACTIVE PROTEIN   Result Value Ref Range    CRP 7.5 (H) 0.0 - 5.0 mg/L   URIC ACID   Result Value Ref Range    Uric Acid 7.3 (H) 3.4 - 7.0 mg/dL       IMPRESSION: The patient is a 40-year-old male presents complaining of pain, erythema, edema to the first MTP joint of the right foot. Patient does not have a history of gout but does have history of renal failure. Patient is afebrile and her only mild symptoms at this time. Septic arthritis is less likely. We will obtain a CRP and uric acid. We also do a x-ray of the right foot. RADIOLOGY:  Xr Foot Right (min 3 Views)    Result Date: 12/1/2017  EXAMINATION: 3 VIEWS OF THE RIGHT FOOT 12/1/2017 8:04 am COMPARISON: None. HISTORY: ORDERING SYSTEM PROVIDED HISTORY: 1st MTP joint pain, swelling FINDINGS: There is no evidence of acute fracture. There is normal alignment of the tarsometatarsal joints.   No acute joint

## 2017-12-04 ENCOUNTER — HOSPITAL ENCOUNTER (OUTPATIENT)
Dept: PHYSICAL THERAPY | Age: 42
Setting detail: THERAPIES SERIES
Discharge: HOME | End: 2017-12-04
Payer: MEDICARE

## 2017-12-04 PROCEDURE — 97110 THERAPEUTIC EXERCISES: CPT

## 2017-12-04 PROCEDURE — 97012 MECHANICAL TRACTION THERAPY: CPT

## 2017-12-04 NOTE — FLOWSHEET NOTE
[x] Raoul Bankst       Outpatient Physical Therapy       [address]       Phone: [phone]       Fax: [phone]       Physical Therapy Daily Treatment Note    Date:  2017  Patient Name:  Howie Mayes  \"Sathish\"  :  1975  MRN: [id number]  Physician: Kely Kelly                        Insurance:  Medicare   Medical Diagnosis:  Brachial Neuritis M54.12, Cervicalgia, Cerv Radiculopathy                 Rehab Codes: M54.2, M79.602, R51, M79.642, M79.645  Onset Date: 10/1/2017                        Next 's appt.:  PCP,  Pain Mgmt        Visit# / total visits: 3/12  Cancels/No Shows: 0/0    Subjective:    Pain:  [x] Yes  [] No Location: neck and left shoulder Pain Rating: (0-10 scale) 2/10  Pain altered Tx:  [x] No  [] Yes  Action:  Comments:  Very little pain in L shoulder and neck with slight tingling in posterior arm into pinkie finger. In ED over the weekend with R great toe gout (2/10). Objective:  Modalities: Mechanical cervical traction (disc invovelment without muscle guarding protocol) 16 lbs max/8 lbs min x 15 minutes  Precautions:  Exercises:  Exercise Reps/ Time Weight/ Level Comments   Cervical AROM 10 ea 5 sec Rotation and side bending   Cervical retraction 10 5 sec    Upper trap stretch  5 ea 10 sec    Levator stretch  5 ea 10 sec    Shoulder shrugs 10     Posterior shoulder rolls 10     Scapular retractions 10     Hand gripper 20 30 lbs    Clothes pins 10 ea  Red with 1st and 2nd / yellow 3rd and 4th   Finger web 10 ea yellow                      Other:  Completed exercise log in bold    Specific Instructions for next treatment:add strengthening exercises for  and pinch    Treatment Charges: Mins Units   []  Modalities     [x]  Ther Exercise 25 2   []  Manual Therapy     []  Ther Activities     []  Aquatics     []  Vasocompression     [x]  Other-traction 15 1   Total Treatment time 40 3       Assessment: [x] Progressing toward goals.   Cervical traction pefPatient notes relief following cervical traction with increase sensation in 4th and 5th phalanges. Patient introduced to gripping exercises to increase wrist flexion/extension with pinching and opposition to improve function for ADL's. Patient performed therapeutic log with good tolerance with mild soreness over wrist flexors. [] No change. [] Other:    STG: (to be met in 12 treatments)  1. ? Pain: Neck, L UE 4/10 at worst  2. ? ROM: Cerv flex 40°, ext 20°, B SB 25°   3. ? Strength: L shoulder 4/5, elbow 4+/5, finger abd 4-/5,  80 lbs, pinch 18lbs, lat pinch 22lbs-to improve ability to lift items and play guitar  4. ? Function:Optimal Score 44% loss of function, 9 hole peg test 26 seconds to indicate increased coordination   5. Independent with Home Exercise Programs  6. Demonstrate Knowledge of fall prevention       Pt. Education:  [x] Yes  [] No  [] Reviewed Prior HEP/Ed  Method of Education: [x] Verbal  [x] Demo  [x] Written-wrist putty exericse  Comprehension of Education:  [x] Verbalizes understanding. [x] Demonstrates understanding. [] Needs review. [] Demonstrates/verbalizes HEP/Ed previously given. Plan: [x] Continue per plan of care.    [] Other:      Time In: 9:48           Time Out: 10:35  Electronically signed by:  Parth Ba PTA

## 2017-12-08 ENCOUNTER — HOSPITAL ENCOUNTER (OUTPATIENT)
Dept: PHYSICAL THERAPY | Age: 42
Setting detail: THERAPIES SERIES
Discharge: HOME | End: 2017-12-08
Payer: MEDICARE

## 2017-12-08 PROCEDURE — 97110 THERAPEUTIC EXERCISES: CPT

## 2017-12-08 PROCEDURE — 97012 MECHANICAL TRACTION THERAPY: CPT

## 2017-12-08 NOTE — FLOWSHEET NOTE
Exercise 30 2   []  Manual Therapy     []  Ther Activities     []  Aquatics     []  Vasocompression     [x]  Other-traction 15 1   Total Treatment time 45        Assessment: [x] Progressing toward goals. Pt demonstrated good understanding of putty exercises. He reported pain and hand numbness resolved after traction    [] No change. [] Other:    STG: (to be met in 12 treatments)  1. ? Pain: Neck, L UE 4/10 at worst  2. ? ROM: Cerv flex 40°, ext 20°, B SB 25°   3. ? Strength: L shoulder 4/5, elbow 4+/5, finger abd 4-/5,  80 lbs, pinch 18lbs, lat pinch 22lbs-to improve ability to lift items and play guitar  4. ? Function:Optimal Score 44% loss of function, 9 hole peg test 26 seconds to indicate increased coordination   5. Independent with Home Exercise Programs  6. Demonstrate Knowledge of fall prevention       Pt. Education:  [x] Yes  [] No  [] Reviewed Prior HEP/Ed  Method of Education: [x] Verbal  [x] Demo  [x] Written- putty exericse  Comprehension of Education:  [x] Verbalizes understanding. [x] Demonstrates understanding. [] Needs review. [x] Demonstrates/verbalizes HEP/Ed previously given. Plan: [x] Continue per plan of care.    [] Other:      Time In: 9:48           Time Out: 10:35    Electronically signed by:  Jose Ruth PTA

## 2017-12-11 ENCOUNTER — OFFICE VISIT (OUTPATIENT)
Dept: INTERNAL MEDICINE | Age: 42
End: 2017-12-11
Payer: MEDICARE

## 2017-12-11 ENCOUNTER — HOSPITAL ENCOUNTER (OUTPATIENT)
Dept: PHYSICAL THERAPY | Age: 42
Setting detail: THERAPIES SERIES
Discharge: HOME | End: 2017-12-11
Payer: MEDICARE

## 2017-12-11 VITALS
HEART RATE: 91 BPM | SYSTOLIC BLOOD PRESSURE: 133 MMHG | BODY MASS INDEX: 31.64 KG/M2 | WEIGHT: 221 LBS | HEIGHT: 70 IN | DIASTOLIC BLOOD PRESSURE: 80 MMHG

## 2017-12-11 DIAGNOSIS — M1A.3710 CHRONIC GOUT DUE TO RENAL IMPAIRMENT INVOLVING TOE OF RIGHT FOOT WITHOUT TOPHUS: Primary | ICD-10-CM

## 2017-12-11 PROCEDURE — G8484 FLU IMMUNIZE NO ADMIN: HCPCS | Performed by: STUDENT IN AN ORGANIZED HEALTH CARE EDUCATION/TRAINING PROGRAM

## 2017-12-11 PROCEDURE — 97110 THERAPEUTIC EXERCISES: CPT

## 2017-12-11 PROCEDURE — 99213 OFFICE O/P EST LOW 20 MIN: CPT

## 2017-12-11 PROCEDURE — 99213 OFFICE O/P EST LOW 20 MIN: CPT | Performed by: STUDENT IN AN ORGANIZED HEALTH CARE EDUCATION/TRAINING PROGRAM

## 2017-12-11 PROCEDURE — 4004F PT TOBACCO SCREEN RCVD TLK: CPT | Performed by: STUDENT IN AN ORGANIZED HEALTH CARE EDUCATION/TRAINING PROGRAM

## 2017-12-11 PROCEDURE — 97012 MECHANICAL TRACTION THERAPY: CPT

## 2017-12-11 PROCEDURE — G8417 CALC BMI ABV UP PARAM F/U: HCPCS | Performed by: STUDENT IN AN ORGANIZED HEALTH CARE EDUCATION/TRAINING PROGRAM

## 2017-12-11 PROCEDURE — G8427 DOCREV CUR MEDS BY ELIG CLIN: HCPCS | Performed by: STUDENT IN AN ORGANIZED HEALTH CARE EDUCATION/TRAINING PROGRAM

## 2017-12-11 RX ORDER — ALLOPURINOL 100 MG/1
100 TABLET ORAL DAILY
Qty: 30 TABLET | Refills: 3 | Status: SHIPPED | OUTPATIENT
Start: 2017-12-11 | End: 2018-03-08 | Stop reason: SDUPTHER

## 2017-12-11 NOTE — PROGRESS NOTES
Visit Information    Have you changed or started any medications since your last visit including any over-the-counter medicines, vitamins, or herbal medicines? no   Have you stopped taking any of your medications? Is so, why? -  no  Are you having any side effects from any of your medications? - no    Have you seen any other physician or provider since your last visit? yes -    Have you had any other diagnostic tests since your last visit? yes -    Have you been seen in the emergency room and/or had an admission in a hospital since we last saw you?  yes -    Have you had your routine dental cleaning in the past 6 months?  no     Do you have an active MyChart account? If no, what is the barrier?   No:     Patient Care Team:  Tiffany Sales MD as PCP - General (Internal Medicine)  Nik Ruiz CNP as PCP - [facility] Attributed Provider    Medical History Review  Past Medical, Family, and Social History reviewed and does not contribute to the patient presenting condition    Health Maintenance   Topic Date Due    Diabetes screen  07/16/2013    DTaP/Tdap/Td vaccine (1 - Tdap) 01/30/2018 (Originally 7/16/1992)    Lipid screen  01/31/2022    Flu vaccine  Completed    Pneumococcal med risk  Completed    HIV screen  Completed

## 2017-12-11 NOTE — PROGRESS NOTES
tablet by mouth daily 30 tablet 3    lidocaine (LMX) 4 % cream Apply topically as needed for Pain Apply topically as needed.  acetaminophen (TYLENOL) 325 MG tablet Take 650 mg by mouth as needed for Pain.  QUEtiapine (SEROQUEL XR) 300 MG XR tablet Take 300 mg by mouth nightly. Two tabs  Q HS      predniSONE (DELTASONE) 10 MG tablet Take 4 tablets by mouth once daily for 5 days 20 tablet 0     No current facility-administered medications for this visit. SOCIAL HISTORY    Reviewed and no change from previous record. Israel Ferris  reports that he has been smoking Cigarettes. He has a 22.00 pack-year smoking history. He quit smokeless tobacco use about 26 years ago. His smokeless tobacco use included Snuff.     FAMILY HISTORY:    Reviewed and No change from previous visit    REVIEW OF SYSTEMS:    CONSTITUTIONAL: Denies: fever, chills  PSYCH: Denies: anxiety, depression  ALLERGIES: Denies: urticaria  EYES: Denies: blurry vision, decreased vision, photophobia  ENT: Denies: sore throat, nasal congestion  CARDIOVASCULAR: Denies: chest pain, dyspnea on exertion  RESPIRATORY: Denies: cough, hemoptysis, shortness of breath  GI: Denies: Denies: abdominal pain, flank pain  : Denies: Denies: dysuria, frequency/urgency  NEURO: Denies: dizzy/vertigo, headache  MUSCULOSKELETAL: Denies: back pain, joint pain  SKIN: Denies: rash, itching    PHYSICAL EXAM:      Vitals:    12/11/17 1248 12/11/17 1255   BP: (!) 152/78 133/80   Site: Right Arm Left Arm   Position: Sitting Sitting   Cuff Size: Medium Adult Medium Adult   Pulse: 94 91   Weight: 221 lb (100.2 kg)    Height: 5' 10\" (1.778 m)      BP Readings from Last 3 Encounters:   12/11/17 133/80   12/01/17 (!) 147/92   11/30/17 122/79      General appearance - alert, well appearing, and in no distress  Mental status - alert, oriented to person, place, and time  Mouth - mucous membranes moist, pharynx normal without lesions  Neck - supple, no significant adenopathy  Chest - clear to auscultation, no wheezes, rales or rhonchi, symmetric air entry  Heart - normal rate, regular rhythm, normal S1, S2, no murmurs, rubs, clicks or gallops  Abdomen - soft, nontender, nondistended, no masses or organomegaly  Neurological - alert, oriented, normal speech, no focal findings or movement disorder noted  Extremities - peripheral pulses normal, no pedal edema, no clubbing or cyanosis  Skin - normal coloration and turgor, no rashes, no suspicious skin lesions noted    LABORATORY FINDINGS:    CBC:  Lab Results   Component Value Date    WBC 11.8 01/31/2017    HGB 15.5 01/31/2017     01/31/2017       BMP:    Lab Results   Component Value Date     01/31/2017    K 3.9 01/31/2017     01/31/2017    CO2 23 01/31/2017    BUN 20 01/31/2017    CREATININE 1.54 01/31/2017    GLUCOSE 120 01/31/2017       HEMOGLOBIN A1C: No results found for: LABA1C    FASTING LIPID PANEL:  Lab Results   Component Value Date    CHOL 199 01/31/2017    HDL 27 (L) 01/31/2017    TRIG 220 (H) 01/31/2017       ASSESSMENT AND PLAN:    Luciana Dos Santos was seen today for follow-up from hospital and gout. 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 · Luciana Dos Santos received counseling on the following healthy behaviors: nutrition, exercise, medication adherence, tobacco cessation and decrease in alcohol consumption    · Discussed use, benefit, and side effects of prescribed medications. Barriers to medication compliance addressed. All patient questions answered. Pt voiced understanding.      · Patient given educational materials - see patient instructions    [address] [place] [address]  12/11/2017, 1:12 PM

## 2017-12-11 NOTE — FLOWSHEET NOTE
[x] Panda Arnold       Outpatient Physical Therapy       [address]       Phone: [phone]       Fax: [phone]       Physical Therapy Daily Treatment Note    Date:  2017  Patient Name:  Hudson Medina  \"Sathish\"  :  1975  MRN: [id number]  Physician: Tony Fuentes                        Insurance:  Medicare   Medical Diagnosis:  Brachial Neuritis M54.12, Cervicalgia, Cerv Radiculopathy                 Rehab Codes: M54.2, M79.602, R51, M79.642, M79.645  Onset Date: 10/1/2017                        Next 's appt.:  PCP,  Pain Mgmt        Visit# / total visits:   Cancels/No Shows: 0/0    Subjective:  Pt denies any neck pain or numbness in the arm or hand. Reports a little soreness yesterday in the upper traps that went away after doing his exercises  Pain:  [] Yes  [x] No Location: neck and left shoulder Pain Rating: (0-10 scale) 0/10  Pain altered Tx:  [x] No  [] Yes  Action:  Comments: . Objective:  Modalities: Mechanical cervical traction (disc invovelment without muscle guarding protocol) 16 lbs max/8 lbs min x 15 minutes  Precautions:  Exercises:  Exercise Reps/ Time Weight/ Level Comments   Cervical AROM 10 ea 5 sec Rotation and side bending   Cervical retraction 10 5 sec    Upper trap stretch  5 ea 10 sec    Levator stretch  5 ea 10 sec    Shoulder shrugs 10 2 lb    Posterior shoulder rolls 10 2 lb    Bicep curls   add   Scapular retractions 10 red Theraband    Overhead rows 10 red Theraband   Shoulder extension 10 red    Hand gripper 20 30 lbs    Pinch pins 10 ea  green 1st, 2nd and 3 jaw pinch; red 3rd; yellow 4th   Lateral pinch 10 green    Putty 5 min coral , pinch, lateral pinch, key pinch, jar opening   UBE-retro 4 min Level 1 For posture   Other: Reviewed putty for  and pinch strengthening. Reviewed Theraband exercises to improve posture and upper back strength, adding shoulder extension and UBE.   Issued red Theraband and handout of

## 2017-12-11 NOTE — PROGRESS NOTES
ATTENDING PHYSICIAN STATEMENT     I have discussed the care of Wilmer Luo, including pertinent history and exam findings with the resident. I have reviewed the key elements of all parts of the encounter with the resident. I have seen and examined the patient with the resident and the key elements of all parts of the encounter have been performed by me. I agree with the assessment, and status of the problem list as documented. Additional Comments: The plan and orders should include  1. 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 Emperatriz Fitzpatrick MD  Attending and Faculty Internal Medicine  [address] [facility]  Internal Medicine [address] [place] [address]   12/11/17 1:27 PM      This note is created with the assistance of a speech-recognition program. While intending to generate a document that actually reflects the content of the visit, the document can still have some mistakes which may not have been identified and corrected by editing.

## 2017-12-15 ENCOUNTER — HOSPITAL ENCOUNTER (OUTPATIENT)
Dept: PHYSICAL THERAPY | Age: 42
Setting detail: THERAPIES SERIES
Discharge: HOME | End: 2017-12-15
Payer: MEDICARE

## 2017-12-15 PROCEDURE — 97110 THERAPEUTIC EXERCISES: CPT

## 2017-12-15 PROCEDURE — 97012 MECHANICAL TRACTION THERAPY: CPT

## 2017-12-15 NOTE — FLOWSHEET NOTE
[x] [facility]       Outpatient Physical Therapy       [address]       Phone: [phone]       Fax: [phone]       Physical Therapy Daily Treatment Note    Date:  12/15/2017  Patient Name:  Nafisa Terrell  \"Sathish\"  :  1975  MRN: [id number]  Physician: Ariadne Cuevas                        Insurance:  Medicare   Medical Diagnosis:  Brachial Neuritis M54.12, Cervicalgia, Cerv Radiculopathy                 Rehab Codes: M54.2, M79.602, R51, M79.642, M79.645  Onset Date: 10/1/2017                        Next 's appt.:  PCP,  Pain Mgmt        Visit# / total visits:   Cancels/No Shows: 0/0    Subjective:  Patient reports no cervical pain today; however continues to experience numbness and tingling under L arm. Reports soreness following last treatment session for 1 day.     Pain:  [] Yes  [x] No Location: neck and left shoulder Pain Rating: (0-10 scale) 0/10  Pain altered Tx:  [x] No  [] Yes  Action:  Comments:     Objective:  Modalities: Mechanical cervical traction (disc invovelment without muscle guarding protocol) 16 lbs max/8 lbs min x 15 minutes  Precautions:  Exercises:  Exercise Reps/ Time Weight/ Level Comments   Cervical AROM 10 ea 5 sec Rotation and side bending   Cervical retraction 10 5 sec    Upper trap stretch  5 ea 10 sec    Levator stretch  5 ea 10 sec    Shoulder shrugs 10 2 lb    Posterior shoulder rolls 10 2 lb    Bicep curls 15 red Added 12.15.17   Scapular retractions 15 red Theraband    Overhead rows 15 red Theraband   Shoulder extension 15 red    Hand gripper 20 30 lbs    Pinch pins 10 ea  green 1st, 2nd and 3 jaw pinch; red 3rd; yellow 4th   Lateral pinch 10 green    Putty 5 min coral , pinch, lateral pinch, key pinch, jar opening   UBE-retro 5 min Level 2 For posture   Other: Increased reps and UBE level    Specific Instructions for next treatment:add corner stretches, bicep curls    Treatment Charges: Mins Units   []  Modalities     [x]  Ther Exercise 30 2   []  Manual Therapy     []  Ther Activities     []  Aquatics     []  Vasocompression     [x]  Other-traction 15 1   Total Treatment time 45 3       Assessment: [x] Progressing toward goals. Started with cervical traction at the beginning of session for disc involvement without guarding with good tolerance. Increased UBE level and reps to increase UE strength for function. Patient required verbal cues with shoulder TB for postural awareness with 75% carry over. [] No change. [] Other:    STG: (to be met in 12 treatments)  1. ? Pain: Neck, L UE 4/10 at worst  2. ? ROM: Cerv flex 40°, ext 20°, B SB 25°   3. ? Strength: L shoulder 4/5, elbow 4+/5, finger abd 4-/5,  80 lbs, pinch 18lbs, lat pinch 22lbs-to improve ability to lift items and play guitar  4. ? Function:Optimal Score 44% loss of function, 9 hole peg test 26 seconds to indicate increased coordination   5. Independent with Home Exercise Programs  6. Demonstrate Knowledge of fall prevention       Pt. Education:  [] Yes  [x] No  [x] Reviewed Prior HEP/Ed  Method of Education: [] Verbal  [] Demo  [] Written-   Comprehension of Education:  [] Verbalizes understanding. [] Demonstrates understanding. [] Needs review. [x] Demonstrates/verbalizes HEP/Ed previously given. Plan: [x] Continue per plan of care.    [] Other:      Time In: 9:45          Time Out: 10:30    Electronically signed by:  Monalisa Peres PTA

## 2017-12-18 ENCOUNTER — HOSPITAL ENCOUNTER (OUTPATIENT)
Dept: PHYSICAL THERAPY | Age: 42
Setting detail: THERAPIES SERIES
Discharge: HOME | End: 2017-12-18
Payer: MEDICARE

## 2017-12-18 PROCEDURE — 97110 THERAPEUTIC EXERCISES: CPT

## 2017-12-18 PROCEDURE — 97012 MECHANICAL TRACTION THERAPY: CPT

## 2017-12-18 NOTE — FLOWSHEET NOTE
[x] [facility]       Outpatient Physical Therapy       [address]       Phone: [phone]       Fax: [phone]       Physical Therapy Daily Treatment Note    Date:  2017  Patient Name:  Misti Guo  \"Sathish\"  :  1975  MRN: [id number]  Physician: Marguerite Heimlich                        Insurance:  Medicare   Medical Diagnosis:  Brachial Neuritis M54.12, Cervicalgia, Cerv Radiculopathy                 Rehab Codes: M54.2, M79.602, R51, M79.642, M79.645  Onset Date: 10/1/2017                        Next 's appt.:  PCP,  Pain Mgmt        Visit# / total visits:   Cancels/No Shows: 0/0    Subjective:  Patient reports no pain in neck or left shoulder when arriving to therapy, stated he was sore over the weekend but no pain. Pt also stated numbness and tingling is very rare now, with any of the home exercises stopping symptoms.    Pain:  [] Yes  [x] No Location: neck and left shoulder Pain Rating: (0-10 scale) 0/10  Pain altered Tx:  [x] No  [] Yes  Action:   Comments:     Objective:  Modalities: Mechanical cervical traction (disc invovelment without muscle guarding protocol) 16 lbs max/8 lbs min x 15 minutes  Precautions:  Exercises:  Exercise Reps/ Time Weight/ Level Comments   Cervical AROM 10 ea 5 sec Rotation and side bending   Cervical retraction 10 5 sec    Upper trap stretch  5 ea 10 sec    Levator stretch  5 ea 10 sec    Corner stretch 5 10 sec Added 12/18   Shoulder shrugs 15 2 lb    Posterior shoulder rolls 15 2 lb    Bicep curls 15 green Added 12.15.17   Scapular retractions 15 green Theraband    Overhead rows 15 green Theraband   Shoulder extension 15 green    Hand gripper 20 30 lbs    Pinch pins 10 ea  green 1st, 2nd and 3 jaw pinch; red 3rd; yellow 4th   Lateral pinch 10 green    Putty 5 min coral , pinch, lateral pinch, key pinch, jar opening   UBE-retro 5 min Level 2 For posture   Other: Increased reps and UBE level    Specific Instructions for next treatment:    Treatment Charges: Mins Units   []  Modalities     [x]  Ther Exercise 30 2   []  Manual Therapy     []  Ther Activities     []  Aquatics     []  Vasocompression     [x]  Other-traction 15 1   Total Treatment time 45 3       Assessment: [x] Progressing toward goals. Cont with stretches and exercises with good dara. Increased reps for shoulder shrugs and rolls, with increasing to green TB with good dara. Cervical traction at end of treatment for decreased symptoms. Progress as dara. [] No change. [] Other:    STG: (to be met in 12 treatments)  1. ? Pain: Neck, L UE 4/10 at worst  2. ? ROM: Cerv flex 40°, ext 20°, B SB 25°   3. ? Strength: L shoulder 4/5, elbow 4+/5, finger abd 4-/5,  80 lbs, pinch 18lbs, lat pinch 22lbs-to improve ability to lift items and play guitar  4. ? Function:Optimal Score 44% loss of function, 9 hole peg test 26 seconds to indicate increased coordination   5. Independent with Home Exercise Programs  6. Demonstrate Knowledge of fall prevention       Pt. Education:  [] Yes  [x] No  [x] Reviewed Prior HEP/Ed  Method of Education: [] Verbal  [] Demo  [] Written-   Comprehension of Education:  [] Verbalizes understanding. [] Demonstrates understanding. [] Needs review. [x] Demonstrates/verbalizes HEP/Ed previously given. Plan: [x] Continue per plan of care.    [] Other:      Time In: 9:35          Time Out: 10:28     Electronically signed by:  Edinson Quigley PTA

## 2017-12-20 ENCOUNTER — HOSPITAL ENCOUNTER (OUTPATIENT)
Dept: PAIN MANAGEMENT | Age: 42
Discharge: HOME | End: 2017-12-20
Payer: MEDICARE

## 2017-12-20 VITALS
RESPIRATION RATE: 16 BRPM | HEART RATE: 103 BPM | TEMPERATURE: 97.3 F | SYSTOLIC BLOOD PRESSURE: 125 MMHG | DIASTOLIC BLOOD PRESSURE: 83 MMHG

## 2017-12-20 DIAGNOSIS — Z51.81 ENCOUNTER FOR MEDICATION MONITORING: Chronic | ICD-10-CM

## 2017-12-20 DIAGNOSIS — G89.29 NECK PAIN, CHRONIC: Chronic | ICD-10-CM

## 2017-12-20 DIAGNOSIS — M54.2 NECK PAIN, CHRONIC: Chronic | ICD-10-CM

## 2017-12-20 DIAGNOSIS — R52 PAIN DISORDER: ICD-10-CM

## 2017-12-20 DIAGNOSIS — M51.26 DISPLACEMENT OF LUMBAR INTERVERTEBRAL DISC WITHOUT MYELOPATHY: ICD-10-CM

## 2017-12-20 PROCEDURE — 99214 OFFICE O/P EST MOD 30 MIN: CPT | Performed by: NURSE PRACTITIONER

## 2017-12-20 PROCEDURE — 99215 OFFICE O/P EST HI 40 MIN: CPT

## 2017-12-20 PROCEDURE — 80307 DRUG TEST PRSMV CHEM ANLYZR: CPT

## 2017-12-20 RX ORDER — GABAPENTIN 300 MG/1
300 CAPSULE ORAL EVERY 8 HOURS SCHEDULED
Qty: 90 CAPSULE | Refills: 0 | Status: SHIPPED | OUTPATIENT
Start: 2017-12-20 | End: 2018-01-26 | Stop reason: SDUPTHER

## 2017-12-20 RX ORDER — OXYCODONE HYDROCHLORIDE 5 MG/1
5 TABLET ORAL EVERY 8 HOURS PRN
Qty: 60 TABLET | Refills: 0 | Status: SHIPPED | OUTPATIENT
Start: 2017-12-31 | End: 2018-01-26 | Stop reason: SDUPTHER

## 2017-12-20 ASSESSMENT — ENCOUNTER SYMPTOMS
COUGH: 0
CONSTIPATION: 0
BACK PAIN: 1
SHORTNESS OF BREATH: 0

## 2017-12-20 ASSESSMENT — PAIN - FUNCTIONAL ASSESSMENT: PAIN_FUNCTIONAL_ASSESSMENT: 0-10

## 2017-12-20 NOTE — PROGRESS NOTES
Patient is here today to review medication contract. Chief Complaint: back and neck pain    PMH: Patient complains of low back pain since [id number] with no known injury. He uses a cane for stability but states this is due to foot/ankle pain not back pain - he did have a fracture in the left ankle the past.  He recevies duramorph injections about every 8-9 months with good relief. His last one was 10/23/17 with ongoing relief. He is also using TENS unit and OTC lidocaine cream with good relief. He has never had back surgery. He does try to walk 4x week. He complains of neck pain. XR and PT ordered - XR shows mild degenerative endplate osteophytes at C5-C6. He started PT for the neck and states it is helping significantly. Currently working on weaning dose of oxycodone. Back Pain   This is a chronic problem. The current episode started more than 1 year ago. The problem occurs constantly. The problem is unchanged. The pain is present in the lumbar spine. The quality of the pain is described as aching and burning (sharp). Radiates to: bilat hips. The pain is at a severity of 2/10. The pain is moderate. The pain is the same all the time. Pertinent negatives include no chest pain or fever. He has tried analgesics, muscle relaxant and NSAIDs for the symptoms. The treatment provided moderate relief. Patient denies any new neurological symptoms. No bowel or bladder incontinence, no weakness, and no falling. Pill count: #10 extra tabs - will reduce dose today    Morphine equivalent: 22.5    Controlled Substances Monitoring:     Attestation: The Prescription Monitoring Report for this patient was reviewed today. (Jose L Nicole, CNP)  Documentation: Possible medication side effects, risk of tolerance and/or dependence, and alternative treatments discussed. , Random urine drug screen sent today., No signs of potential drug abuse or diversion identified., Existing medication contract. Jose L Nicole needed for Muscle spasms, Disp: , Rfl:     docusate sodium (COLACE) 100 MG capsule, Take 1 capsule by mouth daily as needed for Constipation, Disp: 60 capsule, Rfl: 3    atorvastatin (LIPITOR) 40 MG tablet, Take 1 tablet by mouth daily, Disp: 30 tablet, Rfl: 3    lidocaine (LMX) 4 % cream, Apply topically as needed for Pain Apply topically as needed. , Disp: , Rfl:     acetaminophen (TYLENOL) 325 MG tablet, Take 650 mg by mouth as needed for Pain., Disp: , Rfl:     QUEtiapine (SEROQUEL XR) 300 MG XR tablet, Take 300 mg by mouth nightly. Two tabs  Q HS, Disp: , Rfl:     Family History   Problem Relation Age of Onset    Breast Cancer Mother     Liver Cancer Mother     Other Father      brain cysts       Social History     Social History    Marital status:      Spouse name: N/A    Number of children: N/A    Years of education: N/A     Occupational History    Not on file. Social History Main Topics    Smoking status: Current Every Day Smoker     Packs/day: 1.00     Years: 22.00     Types: Cigarettes    Smokeless tobacco: Former User     Types: Snuff     Quit date: 7/26/1991      Comment: 1/2 to 1 ppd    Alcohol use No    Drug use: No    Sexual activity: Not on file     Other Topics Concern    Not on file     Social History Narrative    No narrative on file       Review of Systems:  Review of Systems   Constitution: Negative for chills and fever. Cardiovascular: Negative for chest pain and irregular heartbeat. Respiratory: Negative for cough and shortness of breath. Musculoskeletal: Positive for back pain and neck pain. Gastrointestinal: Negative for constipation. Neurological: Negative for disturbances in coordination and loss of balance. Physical Exam:  /83   Pulse 103   Temp 97.3 °F (36.3 °C) (Oral)   Resp 16     Physical Exam   Constitutional: He is oriented to person, place, and time and well-developed, well-nourished, and in no distress.    HENT:   Head: Normocephalic. Eyes: EOM are normal.   Neck: Normal range of motion. Pulmonary/Chest: Effort normal.   Musculoskeletal: Normal range of motion. Cervical back: He exhibits tenderness. Lumbar back: He exhibits tenderness and pain. Neurological: He is alert and oriented to person, place, and time. Skin: Skin is warm and dry. Psychiatric: Affect normal.       Record/Diagnostics Review:    XR Cervical 2017 -     All 7 cervical vertebrae are visualized.  Vertebral body heights are  maintained.  Mild straightening of the normal cervical lordosis is likely  positional given lack of prevertebral soft tissue swelling.  Mild  degenerative endplate osteophytes at C5-C6. Assessment:  Problem List Items Addressed This Visit     Neck pain, chronic (Chronic)    Relevant Medications    oxyCODONE (ROXICODONE) 5 MG immediate release tablet (Start on 12/31/2017)    gabapentin (NEURONTIN) 300 MG capsule    Encounter for medication monitoring (Chronic)    Displacement of lumbar intervertebral disc without myelopathy    Relevant Medications    oxyCODONE (ROXICODONE) 5 MG immediate release tablet (Start on 12/31/2017)    Pain disorder    Relevant Medications    oxyCODONE (ROXICODONE) 5 MG immediate release tablet (Start on 12/31/2017)      Other Visit Diagnoses    None. Treatment Plan:  DISCUSSION: Treatment options discussed with patient and all questions answered to patient's satisfaction. TREATMENT OPTIONS:   ROSSI  Oxycodone reduced to #60 tabs today  Continue PT for neck  Continue exercise  Contract compliance requirements are met. Satisfactory pain management plan. Medications help with personal goals and self-care needs. Follow up appointment scheduled.

## 2017-12-22 ENCOUNTER — HOSPITAL ENCOUNTER (OUTPATIENT)
Dept: PHYSICAL THERAPY | Age: 42
Setting detail: THERAPIES SERIES
Discharge: HOME | End: 2017-12-22
Payer: MEDICARE

## 2017-12-22 PROCEDURE — 97110 THERAPEUTIC EXERCISES: CPT

## 2017-12-22 PROCEDURE — 97012 MECHANICAL TRACTION THERAPY: CPT

## 2017-12-22 NOTE — FLOWSHEET NOTE
[x] Elizabeth [facility]       Outpatient Physical Therapy       [address]       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 Hasn't had to use lidocaine cream on his neck for 3 weeks. Says exercises are helping.   Does them regularly  Pain:  [] Yes  [x] No Location: neck and left shoulder Pain Rating: (0-10 scale) 0/10  Pain altered Tx:  [x] No  [] Yes  Action:   Comments:     Objective:  Modalities: Mechanical cervical traction (disc invovelment without muscle guarding protocol) 16 lbs max/8 lbs min x 15 minutes  Precautions:  Exercises:  Exercise Reps/ Time Weight/ Level Comments   Cervical AROM 10 ea 5 sec Rotation and side bending   Cervical retraction 10 5 sec    Upper trap stretch  5 ea 10 sec    Levator stretch  5 ea 10 sec    Corner stretch 5 10 sec    Shoulder shrugs 15 2 lb    Posterior shoulder rolls 15 2 lb    Theraband      Scapular retractions 15 green    Overhead rows 15 green    Shoulder extension 15 green    Biceps curls 15 green    Shoulder ER 15 green bilateral   Tricep press 15 green          Hand gripper 20 30 lbs    Pinch pins 10 ea  green 1st, 2nd and 3 jaw pinch; red 3rd; yellow 4th   Lateral pinch 10 green    Putty 5 min coral , pinch, lateral pinch, key pinch, jar opening   UBE-retro 5 min Level 2 For posture   Other:Added triceps and bilateral ER with Theraband-issued handout of both and also biceps with green band for progression of home exercises    Specific Instructions for next treatment:    Treatment Charges: Mins Units   []  Modalities     [x]  Ther Exercise 30 2   []  Manual Therapy     []  Ther Activities     []  Aquatics     []  Vasocompression     [x]  Other-traction 15 1   Total Treatment time 45 3       Assessment: [x] Progressing toward goals. Tolerated additional exercises to promote increased strength of scapular stabilizers and elbows. Pt demonstrates good understanding of all education (current and previous). [] No change. [] Other:    STG: (to be met in 12 treatments)  1. ? Pain: Neck, L UE 4/10 at worst  2. ? ROM: Cerv flex 40°, ext 20°, B SB 25°   3. ? Strength: L shoulder 4/5, elbow 4+/5, finger abd 4-/5,  80 lbs, pinch 18lbs, lat pinch 22lbs-to improve ability to lift items and play guitar  4. ? Function:Optimal Score 44% loss of function, 9 hole peg test 26 seconds to indicate increased coordination   5. Independent with Home Exercise Programs  6. Demonstrate Knowledge of fall prevention       Pt. Education:  [x] Yes  [] No  [x] Reviewed Prior HEP/Ed  Method of Education: [x] Verbal  [x] Demo  [x] Written- above  Comprehension of Education:  [] Verbalizes understanding. [x] Demonstrates understanding-new ex. [] Needs review. [x] Demonstrates/verbalizes HEP/Ed previously given. Plan: [x] Continue per plan of care.    [] Other:      Time In: 9:45          Time Out: 10:39    Electronically signed by:  Katerin Willis PTA

## 2017-12-24 LAB
6-ACETYLMORPHINE, UR: NOT DETECTED
7-AMINOCLONAZEPAM, URINE: NOT DETECTED
ALPHA-OH-ALPRAZ, URINE: NOT DETECTED
ALPRAZOLAM, URINE: NOT DETECTED
AMPHETAMINES, URINE: NOT DETECTED
BARBITURATES, URINE: NOT DETECTED
BENZOYLECGONINE, UR: NOT DETECTED
BUPRENORPHINE URINE: NOT DETECTED
CARISOPRODOL, UR: NOT DETECTED
CLONAZEPAM, URINE: NOT DETECTED
CODEINE, URINE: NOT DETECTED
CREATININE URINE: 108.1 MG/DL (ref 20–400)
DIAZEPAM, URINE: NOT DETECTED
EER PAIN MGT DRUG PANEL, HIGH RES/EMIT U: NORMAL
ETHYL GLUCURONIDE UR: NOT DETECTED
FENTANYL URINE: NOT DETECTED
HYDROCODONE, URINE: NOT DETECTED
HYDROMORPHONE, URINE: NOT DETECTED
LORAZEPAM, URINE: NOT DETECTED
MARIJUANA METAB, UR: NOT DETECTED
MDA, UR: NOT DETECTED
MDEA, EVE, UR: NOT DETECTED
MDMA URINE: NOT DETECTED
MEPERIDINE METAB, UR: NOT DETECTED
METHADONE, URINE: NOT DETECTED
METHAMPHETAMINE, URINE: NOT DETECTED
METHYLPHENIDATE: NOT DETECTED
MIDAZOLAM, URINE: NOT DETECTED
MORPHINE URINE: NOT DETECTED
NORBUPRENORPHINE, URINE: NOT DETECTED
NORDIAZEPAM, URINE: NOT DETECTED
NORFENTANYL, URINE: NOT DETECTED
NORHYDROCODONE, URINE: NOT DETECTED
NOROXYCODONE, URINE: PRESENT
NOROXYMORPHONE, URINE: PRESENT
OXAZEPAM, URINE: NOT DETECTED
OXYCODONE URINE: PRESENT
OXYMORPHONE, URINE: PRESENT
PAIN MGT DRUG PANEL, HI RES, UR: NORMAL
PCP,URINE: NOT DETECTED
PHENTERMINE, UR: NOT DETECTED
PROPOXYPHENE, URINE: NOT DETECTED
TAPENTADOL, URINE: NOT DETECTED
TAPENTADOL-O-SULFATE, URINE: NOT DETECTED
TEMAZEPAM, URINE: NOT DETECTED
TRAMADOL, URINE: NOT DETECTED
ZOLPIDEM, URINE: NOT DETECTED

## 2017-12-27 ENCOUNTER — HOSPITAL ENCOUNTER (OUTPATIENT)
Dept: PHYSICAL THERAPY | Age: 42
Setting detail: THERAPIES SERIES
Discharge: HOME | End: 2017-12-27
Payer: MEDICARE

## 2017-12-27 PROCEDURE — 97012 MECHANICAL TRACTION THERAPY: CPT

## 2017-12-27 PROCEDURE — 97110 THERAPEUTIC EXERCISES: CPT

## 2017-12-27 NOTE — FLOWSHEET NOTE
[x] [facility]       Outpatient Physical Therapy       [address]       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 :  Pain Mgmt        Visit# / total visits:   Cancels/No Shows: 0/0    Subjective:  Patient states he is not really having any pain tpday  Pain:  [] Yes  [x] No Location: neck and left shoulder Pain Rating: (0-10 scale) 0/10  Pain altered Tx:  [x] No  [] Yes  Action:   Comments:     Objective:  Modalities: Mechanical cervical traction (disc invovelment without muscle guarding protocol) 16 lbs max/8 lbs min x 15 minutes  Precautions:  Exercises:  Exercise Reps/ Time Weight/ Level Comments   Cervical AROM 10 ea 5 sec Rotation and side bending   Cervical retraction 10 5 sec    Upper trap stretch  5 ea 10 sec    Levator stretch  5 ea 15 sec    Corner stretch 5 10 sec    Shoulder shrugs 15 2 lb    Posterior shoulder rolls 15 2 lb    Theraband      Scapular retractions 15 green    Overhead rows 15 green    Shoulder extension 15 green    Biceps curls 15 green    Shoulder ER 15 green bilateral   Tricep press 15 green    Prone      Rows 15 2 lb    Horizontal abduction 15 2 lb    Shoulder extension 15 2 lb          Hand gripper 20 30 lbs    Pinch pins 10 ea  green 1st, 2nd and 3 jaw pinch; red 3rd; yellow 4th   Lateral pinch 10 green    Putty 5 min coral , pinch, lateral pinch, key pinch, jar opening   UBE-retro 5 min Level 2.5 For posture   Other:Reviewed triceps,biceps and bilateral ER with Theraband. Added prone exercises for upper back strengthening.     Specific Instructions for next treatment:    Treatment Charges: Mins Units   []  Modalities     [x]  Ther Exercise 30 2   []  Manual Therapy     []  Ther Activities     []  Aquatics     []  Vasocompression     [x]  Other-traction 15 1   Total Treatment time 45 3       Assessment: [x] Progressing toward goals. Tolerated addition of prone exercises to promote increased strength of scapular stabilizers/upper back. Continues to have minimal to no pain. [] No change. [] Other:    STG: (to be met in 12 treatments)  1. ? Pain: Neck, L UE 4/10 at worst  2. ? ROM: Cerv flex 40°, ext 20°, B SB 25°   3. ? Strength: L shoulder 4/5, elbow 4+/5, finger abd 4-/5,  80 lbs, pinch 18lbs, lat pinch 22lbs-to improve ability to lift items and play guitar  4. ? Function:Optimal Score 44% loss of function, 9 hole peg test 26 seconds to indicate increased coordination   5. Independent with Home Exercise Programs  6. Demonstrate Knowledge of fall prevention       Pt. Education:  [x] Yes  [] No  [x] Reviewed Prior HEP/Ed  Method of Education: [x] Verbal  [x] Demo  [] Written-  Comprehension of Education:  [] Verbalizes understanding. [x] Demonstrates understanding-new ex. [] Needs review. [x] Demonstrates/verbalizes HEP/Ed previously given. Plan: [x] Continue per plan of care.    [] Other:      Time In: 9:42          Time Out: 10:39    Electronically signed by:  Eve Angela PTA

## 2017-12-29 ENCOUNTER — HOSPITAL ENCOUNTER (OUTPATIENT)
Dept: PHYSICAL THERAPY | Age: 42
Setting detail: THERAPIES SERIES
Discharge: HOME | End: 2017-12-29
Payer: MEDICARE

## 2017-12-29 PROCEDURE — 97012 MECHANICAL TRACTION THERAPY: CPT

## 2017-12-29 PROCEDURE — G8978 MOBILITY CURRENT STATUS: HCPCS

## 2017-12-29 PROCEDURE — G8979 MOBILITY GOAL STATUS: HCPCS

## 2017-12-29 PROCEDURE — 97110 THERAPEUTIC EXERCISES: CPT

## 2017-12-29 NOTE — FLOWSHEET NOTE
[x] Amanda Julien       Outpatient Physical Therapy       [address]       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 :  Pain Mgmt        Visit# / total visits: 10/12  Cancels/No Shows: 0/0    Subjective:  Patient denies having any pain. Says he has a little shoulder soreness that he attributes to the exercises  Pain:  [] Yes  [x] No Location:  Pain Rating: (0-10 scale) 0/10  Pain altered Tx:  [x] No  [] Yes  Action:   Comments:     Objective:  Modalities: Mechanical cervical traction (disc invovlement without muscle guarding protocol) 16 lbs max/8 lbs min x 15 minutes  Precautions:  Exercises:  Exercise Reps/ Time Weight/ Level Comments   Cervical AROM 10 ea 5 sec Rotation and side bending   Cervical retraction 10 5 sec    Upper trap stretch  5 ea 10 sec    Levator stretch  5 ea 15 sec    Corner stretch 5 10 sec    Shoulder shrugs 15 2 lb    Posterior shoulder rolls 15 2 lb    Theraband      Scapular retractions 15 green    Overhead rows 15 green    Shoulder extension 15 green    Biceps curls 15 green    Shoulder ER 15 green bilateral   Tricep press 15 green    Prone      Rows 15 2 lb    Horizontal abduction 15 2 lb    Shoulder extension 15 2 lb          Hand gripper 20 30 lbs    Pinch pins 10 ea  green 1st, 2nd and 3 jaw pinch; red 3rd; yellow 4th   Lateral pinch 10 green    Putty 5 min coral , pinch, lateral pinch, key pinch, jar opening   UBE-retro 5 min Level 2.5 For posture   Other:Reviewed prone exercises for upper back strengthening.     Specific Instructions for next

## 2018-01-03 ENCOUNTER — HOSPITAL ENCOUNTER (OUTPATIENT)
Dept: PHYSICAL THERAPY | Age: 43
Setting detail: THERAPIES SERIES
Discharge: HOME | End: 2018-01-03
Payer: MEDICARE

## 2018-01-03 PROCEDURE — 97110 THERAPEUTIC EXERCISES: CPT

## 2018-01-03 PROCEDURE — 97012 MECHANICAL TRACTION THERAPY: CPT

## 2018-01-03 NOTE — FLOWSHEET NOTE
[x] Amina Skaggs       Outpatient Physical Therapy       [address]       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 :  Pain Mgmt        Visit# / total visits:   Cancels/No Shows: 0/0    Subjective:  Patient denies having any pain. Hasn't had any pain down the arm since the first week of therapy. Whenever he gets any neck pain he does his neck stretches and this relieves the pain.   Pain:  [] Yes  [x] No Location:  Pain Rating: (0-10 scale) 0/10  Pain altered Tx:  [x] No  [] Yes  Action:   Comments:     Objective:  Modalities: Mechanical cervical traction (disc invovlement without muscle guarding protocol) 16 lbs max/8 lbs min x 15 minutes  Precautions:  Exercises:  Exercise Reps/ Time Weight/ Level Comments   Cervical AROM 10 ea 5 sec Rotation and side bending   Cervical retraction 10 5 sec    Upper trap stretch  5 ea 10 sec    Levator stretch  5 ea 15 sec    Corner stretch 5 10 sec    Shoulder shrugs 15 3 lb    Posterior shoulder rolls 15 3 lb    Theraband      Scapular retractions 15 green    Overhead rows 15 green    Shoulder extension 15 green    Biceps curls 15 green    Shoulder ER 15 green bilateral   Tricep press 15 green    Prone      Rows 15 3 lb    Horizontal abduction 15 3 lb    Shoulder extension 15 3 lb          Hand gripper 20 30 lbs    Pinch pins 10 ea  green 1st, 2nd and 3 jaw pinch; red 3rd; yellow 4th   Lateral pinch 10 green    Putty 5 min coral , pinch, lateral pinch, key pinch, jar opening   UBE-retro 5 min Level 2.5 For posture   Other:    Specific

## 2018-01-05 ENCOUNTER — HOSPITAL ENCOUNTER (OUTPATIENT)
Dept: PHYSICAL THERAPY | Age: 43
Setting detail: THERAPIES SERIES
Discharge: HOME | End: 2018-01-05
Payer: MEDICARE

## 2018-01-05 PROCEDURE — 97110 THERAPEUTIC EXERCISES: CPT

## 2018-01-05 PROCEDURE — G8980 MOBILITY D/C STATUS: HCPCS

## 2018-01-05 PROCEDURE — G8978 MOBILITY CURRENT STATUS: HCPCS

## 2018-01-05 PROCEDURE — G8979 MOBILITY GOAL STATUS: HCPCS

## 2018-01-05 NOTE — FLOWSHEET NOTE
Exercise 30 2   []  Manual Therapy     []  Ther Activities     []  Aquatics     []  Vasocompression     []  Other-traction     Total Treatment time 30        Assessment: [x] Progressing toward goals-see progress note same date    [] No change. [x] Other:tolerated increased resistance with Theraband exercises and declined traction  STG: (to be met in 12 treatments)  1. ? Pain: Neck, L UE 4/10 at worst  2. ? ROM: Cerv flex 40°, ext 20°, B SB 25°   3. ? Strength: L shoulder 4/5, elbow 4+/5, finger abd 4-/5,  80 lbs, pinch 18lbs, lat pinch 22lbs-to improve ability to lift items and play guitar  4. ? Function:Optimal Score 44% loss of function, 9 hole peg test 26 seconds to indicate increased coordination   5. Independent with Home Exercise Programs  6. Demonstrate Knowledge of fall prevention       Pt. Education:  [] Yes  [] No  [x] Reviewed Prior HEP/Ed  Method of Education: [x] Verbal  [x] Demo  [] Written-  Comprehension of Education:  [] Verbalizes understanding. [] Demonstrates understanding  [] Needs review. [x] Demonstrates/verbalizes HEP/Ed previously given. Plan: [] Continue per plan of care.    [x] Other:last visit, recommend pt continue with home exercises      Time In: 9:45         Time Out: 10:45    Electronically signed by:  Joaquina Jimenez PTA

## 2018-01-25 ENCOUNTER — HOSPITAL ENCOUNTER (OUTPATIENT)
Age: 43
Setting detail: SPECIMEN
Discharge: HOME | End: 2018-01-25
Payer: MEDICARE

## 2018-01-25 DIAGNOSIS — N18.30 CKD (CHRONIC KIDNEY DISEASE) STAGE 3, GFR 30-59 ML/MIN (HCC): ICD-10-CM

## 2018-01-25 DIAGNOSIS — Z13.1 DIABETES MELLITUS SCREENING: ICD-10-CM

## 2018-01-25 LAB
ANION GAP SERPL CALCULATED.3IONS-SCNC: 15 MMOL/L (ref 9–17)
BUN BLDV-MCNC: 13 MG/DL (ref 6–20)
BUN/CREAT BLD: ABNORMAL (ref 9–20)
CALCIUM SERPL-MCNC: 9.3 MG/DL (ref 8.6–10.4)
CHLORIDE BLD-SCNC: 102 MMOL/L (ref 98–107)
CO2: 22 MMOL/L (ref 20–31)
CREAT SERPL-MCNC: 1.34 MG/DL (ref 0.7–1.2)
GFR AFRICAN AMERICAN: >60 ML/MIN
GFR NON-AFRICAN AMERICAN: 58 ML/MIN
GFR SERPL CREATININE-BSD FRML MDRD: ABNORMAL ML/MIN/{1.73_M2}
GFR SERPL CREATININE-BSD FRML MDRD: ABNORMAL ML/MIN/{1.73_M2}
GLUCOSE BLD-MCNC: 89 MG/DL (ref 70–99)
GLUCOSE FASTING: 89 MG/DL (ref 70–99)
POTASSIUM SERPL-SCNC: 3.9 MMOL/L (ref 3.7–5.3)
SODIUM BLD-SCNC: 139 MMOL/L (ref 135–144)

## 2018-01-25 PROCEDURE — 80048 BASIC METABOLIC PNL TOTAL CA: CPT

## 2018-01-25 PROCEDURE — 36415 COLL VENOUS BLD VENIPUNCTURE: CPT

## 2018-01-26 ENCOUNTER — HOSPITAL ENCOUNTER (OUTPATIENT)
Dept: PAIN MANAGEMENT | Age: 43
Discharge: HOME | End: 2018-01-26
Payer: MEDICARE

## 2018-01-26 VITALS
SYSTOLIC BLOOD PRESSURE: 120 MMHG | TEMPERATURE: 98.2 F | BODY MASS INDEX: 31.5 KG/M2 | WEIGHT: 220 LBS | DIASTOLIC BLOOD PRESSURE: 78 MMHG | RESPIRATION RATE: 16 BRPM | HEIGHT: 70 IN

## 2018-01-26 DIAGNOSIS — M51.26 DISPLACEMENT OF LUMBAR INTERVERTEBRAL DISC WITHOUT MYELOPATHY: ICD-10-CM

## 2018-01-26 DIAGNOSIS — R52 PAIN DISORDER: ICD-10-CM

## 2018-01-26 DIAGNOSIS — Z51.81 ENCOUNTER FOR MEDICATION MONITORING: Primary | Chronic | ICD-10-CM

## 2018-01-26 PROCEDURE — 99213 OFFICE O/P EST LOW 20 MIN: CPT | Performed by: NURSE PRACTITIONER

## 2018-01-26 PROCEDURE — 99214 OFFICE O/P EST MOD 30 MIN: CPT

## 2018-01-26 RX ORDER — OXYCODONE HYDROCHLORIDE 5 MG/1
5 TABLET ORAL EVERY 8 HOURS PRN
Qty: 60 TABLET | Refills: 0 | Status: SHIPPED | OUTPATIENT
Start: 2018-02-03 | End: 2018-02-28 | Stop reason: SDUPTHER

## 2018-01-26 RX ORDER — GABAPENTIN 300 MG/1
300 CAPSULE ORAL EVERY 8 HOURS SCHEDULED
Qty: 90 CAPSULE | Refills: 0 | Status: SHIPPED | OUTPATIENT
Start: 2018-01-26 | End: 2018-02-28 | Stop reason: SDUPTHER

## 2018-01-26 ASSESSMENT — ENCOUNTER SYMPTOMS
CONSTIPATION: 0
BACK PAIN: 1
COUGH: 0
SHORTNESS OF BREATH: 0

## 2018-01-26 NOTE — PROGRESS NOTES
List Items Addressed This Visit     Encounter for medication monitoring - Primary (Chronic)    Displacement of lumbar intervertebral disc without myelopathy    Relevant Medications    oxyCODONE (ROXICODONE) 5 MG immediate release tablet (Start on 2/3/2018)    Pain disorder    Relevant Medications    oxyCODONE (ROXICODONE) 5 MG immediate release tablet (Start on 2/3/2018)      Other Visit Diagnoses    None. Treatment Plan:  DISCUSSION: Treatment options discussed with patient and all questions answered to patient's satisfaction. TREATMENT OPTIONS:     Continue opioid therapy. Script written for oxycodone and gabapentin  Pt would like to continue at 60 tablets a month. Did discuss titrating down to #45 since neck pain has improved but would like to see how this month goes first   Contract requirements met. Satisfactory pain management plan. Medication helps with personal goals and self care needs. Patient is stable on current regimen of meds and medication is effectively managing pain, we will continue current medications without changes. As always, we encourage daily stretching and strengthening exercises, and recommend minimizing use of pain medications unless patient cannot get through daily activities due to pain.   Follow up appointment made for 4 weeks

## 2018-02-22 DIAGNOSIS — E78.2 MIXED HYPERLIPIDEMIA: ICD-10-CM

## 2018-02-22 RX ORDER — ATORVASTATIN CALCIUM 40 MG/1
40 TABLET, FILM COATED ORAL DAILY
Qty: 30 TABLET | Refills: 3 | Status: SHIPPED | OUTPATIENT
Start: 2018-02-22 | End: 2018-06-26 | Stop reason: SDUPTHER

## 2018-02-28 ENCOUNTER — HOSPITAL ENCOUNTER (OUTPATIENT)
Dept: PAIN MANAGEMENT | Age: 43
Discharge: HOME | End: 2018-02-28
Payer: MEDICARE

## 2018-02-28 VITALS — SYSTOLIC BLOOD PRESSURE: 115 MMHG | RESPIRATION RATE: 16 BRPM | TEMPERATURE: 99 F | DIASTOLIC BLOOD PRESSURE: 78 MMHG

## 2018-02-28 DIAGNOSIS — M54.2 NECK PAIN, CHRONIC: Primary | Chronic | ICD-10-CM

## 2018-02-28 DIAGNOSIS — Z51.81 ENCOUNTER FOR MEDICATION MONITORING: Chronic | ICD-10-CM

## 2018-02-28 DIAGNOSIS — R52 PAIN DISORDER: ICD-10-CM

## 2018-02-28 DIAGNOSIS — G89.29 NECK PAIN, CHRONIC: Primary | Chronic | ICD-10-CM

## 2018-02-28 DIAGNOSIS — M51.26 DISPLACEMENT OF LUMBAR INTERVERTEBRAL DISC WITHOUT MYELOPATHY: ICD-10-CM

## 2018-02-28 PROCEDURE — 99213 OFFICE O/P EST LOW 20 MIN: CPT | Performed by: NURSE PRACTITIONER

## 2018-02-28 PROCEDURE — 99214 OFFICE O/P EST MOD 30 MIN: CPT

## 2018-02-28 RX ORDER — GABAPENTIN 300 MG/1
300 CAPSULE ORAL EVERY 8 HOURS SCHEDULED
Qty: 90 CAPSULE | Refills: 0 | Status: SHIPPED | OUTPATIENT
Start: 2018-02-28 | End: 2018-04-05 | Stop reason: SDUPTHER

## 2018-02-28 RX ORDER — OXYCODONE HYDROCHLORIDE 5 MG/1
5 TABLET ORAL 2 TIMES DAILY PRN
Qty: 60 TABLET | Refills: 0 | Status: SHIPPED | OUTPATIENT
Start: 2018-03-07 | End: 2018-04-05 | Stop reason: SDUPTHER

## 2018-02-28 ASSESSMENT — ENCOUNTER SYMPTOMS
CONSTIPATION: 0
SHORTNESS OF BREATH: 0
COUGH: 0
BOWEL INCONTINENCE: 0
BACK PAIN: 1

## 2018-03-08 ENCOUNTER — OFFICE VISIT (OUTPATIENT)
Dept: INTERNAL MEDICINE | Age: 43
End: 2018-03-08
Payer: MEDICARE

## 2018-03-08 VITALS
DIASTOLIC BLOOD PRESSURE: 87 MMHG | BODY MASS INDEX: 31.14 KG/M2 | SYSTOLIC BLOOD PRESSURE: 135 MMHG | WEIGHT: 217 LBS | HEART RATE: 116 BPM

## 2018-03-08 DIAGNOSIS — M1A.3710 CHRONIC GOUT DUE TO RENAL IMPAIRMENT INVOLVING TOE OF RIGHT FOOT WITHOUT TOPHUS: Primary | ICD-10-CM

## 2018-03-08 DIAGNOSIS — F31.9 BIPOLAR 1 DISORDER (HCC): ICD-10-CM

## 2018-03-08 PROCEDURE — G0444 DEPRESSION SCREEN ANNUAL: HCPCS | Performed by: INTERNAL MEDICINE

## 2018-03-08 PROCEDURE — G8482 FLU IMMUNIZE ORDER/ADMIN: HCPCS | Performed by: INTERNAL MEDICINE

## 2018-03-08 PROCEDURE — G8427 DOCREV CUR MEDS BY ELIG CLIN: HCPCS | Performed by: INTERNAL MEDICINE

## 2018-03-08 PROCEDURE — 99213 OFFICE O/P EST LOW 20 MIN: CPT

## 2018-03-08 PROCEDURE — 4004F PT TOBACCO SCREEN RCVD TLK: CPT | Performed by: INTERNAL MEDICINE

## 2018-03-08 PROCEDURE — G8417 CALC BMI ABV UP PARAM F/U: HCPCS | Performed by: INTERNAL MEDICINE

## 2018-03-08 PROCEDURE — 99213 OFFICE O/P EST LOW 20 MIN: CPT | Performed by: INTERNAL MEDICINE

## 2018-03-08 RX ORDER — ALLOPURINOL 100 MG/1
100 TABLET ORAL DAILY
Qty: 30 TABLET | Refills: 5 | Status: SHIPPED | OUTPATIENT
Start: 2018-03-08 | End: 2019-04-12 | Stop reason: ALTCHOICE

## 2018-03-08 RX ORDER — ALLOPURINOL 100 MG/1
100 TABLET ORAL DAILY
Qty: 30 TABLET | Refills: 3 | Status: SHIPPED | OUTPATIENT
Start: 2018-03-08 | End: 2018-03-08 | Stop reason: SDUPTHER

## 2018-03-08 ASSESSMENT — PATIENT HEALTH QUESTIONNAIRE - PHQ9
8. MOVING OR SPEAKING SO SLOWLY THAT OTHER PEOPLE COULD HAVE NOTICED. OR THE OPPOSITE, BEING SO FIGETY OR RESTLESS THAT YOU HAVE BEEN MOVING AROUND A LOT MORE THAN USUAL: 0
SUM OF ALL RESPONSES TO PHQ9 QUESTIONS 1 & 2: 2
10. IF YOU CHECKED OFF ANY PROBLEMS, HOW DIFFICULT HAVE THESE PROBLEMS MADE IT FOR YOU TO DO YOUR WORK, TAKE CARE OF THINGS AT HOME, OR GET ALONG WITH OTHER PEOPLE: 1
2. FEELING DOWN, DEPRESSED OR HOPELESS: 1
3. TROUBLE FALLING OR STAYING ASLEEP: 1
1. LITTLE INTEREST OR PLEASURE IN DOING THINGS: 1
9. THOUGHTS THAT YOU WOULD BE BETTER OFF DEAD, OR OF HURTING YOURSELF: 0
4. FEELING TIRED OR HAVING LITTLE ENERGY: 1
5. POOR APPETITE OR OVEREATING: 1
SUM OF ALL RESPONSES TO PHQ QUESTIONS 1-9: 6
7. TROUBLE CONCENTRATING ON THINGS, SUCH AS READING THE NEWSPAPER OR WATCHING TELEVISION: 1
6. FEELING BAD ABOUT YOURSELF - OR THAT YOU ARE A FAILURE OR HAVE LET YOURSELF OR YOUR FAMILY DOWN: 0

## 2018-03-08 ASSESSMENT — ENCOUNTER SYMPTOMS
BLURRED VISION: 0
ABDOMINAL PAIN: 0
CONSTIPATION: 0
DOUBLE VISION: 0
SHORTNESS OF BREATH: 0
NAUSEA: 0
WHEEZING: 0
DIARRHEA: 0
COUGH: 0
BACK PAIN: 0
HEMOPTYSIS: 0
HEARTBURN: 0
VOMITING: 0
SPUTUM PRODUCTION: 0
ORTHOPNEA: 0

## 2018-03-08 NOTE — PATIENT INSTRUCTIONS
RTC we will call and or mail appt card to pt. Medications e-scribe to pharmacy of pt's choice. Script for lab given to pt, no fasting required. Pt will get labs done before next appt. An After Visit Summary was printed and given to the patient. CB      LABORATORY INSTRUCTIONS    Your doctor has ordered blood or urine testing. You can get this testing done at the Lab located on the first floor of the [facility], or at any other [facility]. Please stop at Main Registration, before going to the lab, as you must be registered first.     Please get this lab done before your next visit.     You may eat or drink before this test.

## 2018-03-08 NOTE — PROGRESS NOTES
Attending Physician Statement  I have discussed the care of Cathi Virgen, including pertinent history and exam findings with the resident. I have reviewed the key elements of all parts of the encounter and discussed them with the resident. Added history includes LBP and gout and CKD 3 with creat 1.3-1.6. He has gone to PT with some benefit and has controlled his gout with allopurinol. His LBP is now managed by pain mgt. Added exam findings include VS ok and exam is ok other than overweight. I agree with the assessment, and status of the problem list as documented. The plan and orders should include continue his current meds   Orders Placed This Encounter   Procedures    Uric Acid    and this was also documented by the resident. I agree with the referral to pain mgt and has. The medication list was reviewed with the resident and is up to date. The return visit should be in 6 months .     Bevin Bosworth
Visit Information    Have you changed or started any medications since your last visit including any over-the-counter medicines, vitamins, or herbal medicines? no   Have you stopped taking any of your medications? Is so, why? -  no  Are you having any side effects from any of your medications? - no    Have you seen any other physician or provider since your last visit? yes - pain mgmt   Have you had any other diagnostic tests since your last visit?  no   Have you been seen in the emergency room and/or had an admission in a hospital since we last saw you?  no   Have you had your routine dental cleaning in the past 6 months?  no     Do you have an active MyChart account? If no, what is the barrier?   No:     Patient Care Team:  Kirsten Edmondson MD as PCP - General (Internal Medicine)  Jesus Quintanilla CNP as PCP - [facility] Attributed Provider    Medical History Review  Past Medical, Family, and Social History reviewed and does not contribute to the patient presenting condition    Health Maintenance   Topic Date Due    DTaP/Tdap/Td vaccine (1 - Tdap) 07/16/1992    Lipid screen  01/31/2022    Flu vaccine  Completed    Pneumococcal med risk  Completed    HIV screen  Completed
mouth daily as needed for Constipation 60 capsule 3    lidocaine (LMX) 4 % cream Apply topically as needed for Pain Apply topically as needed.  acetaminophen (TYLENOL) 325 MG tablet Take 650 mg by mouth as needed for Pain. No current facility-administered medications for this visit. Social History   Substance Use Topics    Smoking status: Current Every Day Smoker     Packs/day: 1.00     Years: 22.00     Types: Cigarettes    Smokeless tobacco: Former User     Types: Snuff     Quit date: 7/26/1991      Comment: 1/2 to 1 ppd    Alcohol use No       Family History   Problem Relation Age of Onset    Breast Cancer Mother     Liver Cancer Mother     Other Father      brain cysts        REVIEW OF SYSTEMS:  Review of Systems   Constitutional: Negative for chills, diaphoresis, fever, malaise/fatigue and weight loss. HENT: Negative. Eyes: Negative for blurred vision and double vision. Respiratory: Negative for cough, hemoptysis, sputum production, shortness of breath and wheezing. Cardiovascular: Negative for chest pain, palpitations, orthopnea, leg swelling and PND. Gastrointestinal: Negative for abdominal pain, constipation, diarrhea, heartburn, nausea and vomiting. Genitourinary: Negative for dysuria, frequency and urgency. Musculoskeletal: Negative for back pain, joint pain, myalgias and neck pain. Skin: Negative for rash. Neurological: Negative for dizziness, tingling, tremors, weakness and headaches. Psychiatric/Behavioral: Negative for depression, substance abuse and suicidal ideas. PHYSICAL EXAM:  Vitals:    03/08/18 1024   BP: 135/87   Site: Left Arm   Position: Sitting   Cuff Size: Medium Adult   Pulse: 116   Weight: 217 lb (98.4 kg)     BP Readings from Last 3 Encounters:   03/08/18 135/87   02/28/18 115/78   01/26/18 120/78        Physical Exam   Constitutional: He is oriented to person, place, and time and well-developed, well-nourished, and in no distress.

## 2018-04-05 ENCOUNTER — HOSPITAL ENCOUNTER (OUTPATIENT)
Dept: PAIN MANAGEMENT | Age: 43
Discharge: HOME | End: 2018-04-05
Payer: MEDICARE

## 2018-04-05 VITALS
BODY MASS INDEX: 31.07 KG/M2 | HEIGHT: 70 IN | WEIGHT: 217 LBS | HEART RATE: 101 BPM | SYSTOLIC BLOOD PRESSURE: 118 MMHG | TEMPERATURE: 98.2 F | DIASTOLIC BLOOD PRESSURE: 77 MMHG

## 2018-04-05 DIAGNOSIS — G89.29 CHRONIC PAIN OF LEFT ANKLE: Primary | ICD-10-CM

## 2018-04-05 DIAGNOSIS — R52 PAIN DISORDER: ICD-10-CM

## 2018-04-05 DIAGNOSIS — Z51.81 ENCOUNTER FOR MEDICATION MONITORING: ICD-10-CM

## 2018-04-05 DIAGNOSIS — M25.572 CHRONIC PAIN OF LEFT ANKLE: Primary | ICD-10-CM

## 2018-04-05 DIAGNOSIS — M51.26 DISPLACEMENT OF LUMBAR INTERVERTEBRAL DISC WITHOUT MYELOPATHY: ICD-10-CM

## 2018-04-05 PROCEDURE — 99214 OFFICE O/P EST MOD 30 MIN: CPT

## 2018-04-05 PROCEDURE — 99214 OFFICE O/P EST MOD 30 MIN: CPT | Performed by: PAIN MEDICINE

## 2018-04-05 RX ORDER — OXYCODONE HYDROCHLORIDE 5 MG/1
5 TABLET ORAL DAILY PRN
Qty: 45 TABLET | Refills: 0 | Status: SHIPPED | OUTPATIENT
Start: 2018-04-05 | End: 2018-05-03 | Stop reason: ALTCHOICE

## 2018-04-05 RX ORDER — GABAPENTIN 300 MG/1
300 CAPSULE ORAL EVERY 8 HOURS SCHEDULED
Qty: 90 CAPSULE | Refills: 0 | Status: SHIPPED | OUTPATIENT
Start: 2018-04-05 | End: 2018-05-03 | Stop reason: SDUPTHER

## 2018-04-05 ASSESSMENT — ENCOUNTER SYMPTOMS
BACK PAIN: 1
PHOTOPHOBIA: 0
BOWEL INCONTINENCE: 0
VISUAL CHANGE: 0
ABDOMINAL PAIN: 0
TROUBLE SWALLOWING: 0

## 2018-05-03 ENCOUNTER — HOSPITAL ENCOUNTER (OUTPATIENT)
Dept: PAIN MANAGEMENT | Age: 43
Discharge: HOME | End: 2018-05-03
Payer: MEDICARE

## 2018-05-03 VITALS — HEART RATE: 110 BPM | WEIGHT: 217 LBS | BODY MASS INDEX: 31.07 KG/M2 | HEIGHT: 70 IN

## 2018-05-03 DIAGNOSIS — M51.26 DISPLACEMENT OF LUMBAR INTERVERTEBRAL DISC WITHOUT MYELOPATHY: ICD-10-CM

## 2018-05-03 DIAGNOSIS — Z51.81 ENCOUNTER FOR MEDICATION MONITORING: Primary | Chronic | ICD-10-CM

## 2018-05-03 PROCEDURE — 99214 OFFICE O/P EST MOD 30 MIN: CPT

## 2018-05-03 PROCEDURE — 80307 DRUG TEST PRSMV CHEM ANLYZR: CPT

## 2018-05-03 PROCEDURE — 99214 OFFICE O/P EST MOD 30 MIN: CPT | Performed by: NURSE PRACTITIONER

## 2018-05-03 RX ORDER — GABAPENTIN 300 MG/1
300 CAPSULE ORAL EVERY 8 HOURS SCHEDULED
Qty: 90 CAPSULE | Refills: 0 | Status: SHIPPED | OUTPATIENT
Start: 2018-05-03 | End: 2018-06-05 | Stop reason: SDUPTHER

## 2018-05-03 RX ORDER — TRAMADOL HYDROCHLORIDE 50 MG/1
50 TABLET ORAL EVERY 8 HOURS PRN
Qty: 90 TABLET | Refills: 0 | Status: SHIPPED | OUTPATIENT
Start: 2018-05-07 | End: 2018-06-05 | Stop reason: SDUPTHER

## 2018-05-03 ASSESSMENT — ENCOUNTER SYMPTOMS
CONSTIPATION: 0
SHORTNESS OF BREATH: 0
BACK PAIN: 1
COUGH: 0
BOWEL INCONTINENCE: 0

## 2018-05-06 LAB
6-ACETYLMORPHINE, UR: NOT DETECTED
7-AMINOCLONAZEPAM, URINE: NOT DETECTED
ALPHA-OH-ALPRAZ, URINE: NOT DETECTED
ALPRAZOLAM, URINE: NOT DETECTED
AMPHETAMINES, URINE: NOT DETECTED
BARBITURATES, URINE: NOT DETECTED
BENZOYLECGONINE, UR: NOT DETECTED
BUPRENORPHINE URINE: NOT DETECTED
CARISOPRODOL, UR: NOT DETECTED
CLONAZEPAM, URINE: NOT DETECTED
CODEINE, URINE: NOT DETECTED
CREATININE URINE: 177.3 MG/DL (ref 20–400)
DIAZEPAM, URINE: NOT DETECTED
EER PAIN MGT DRUG PANEL, HIGH RES/EMIT U: NORMAL
ETHYL GLUCURONIDE UR: NOT DETECTED
FENTANYL URINE: NOT DETECTED
HYDROCODONE, URINE: NOT DETECTED
HYDROMORPHONE, URINE: NOT DETECTED
LORAZEPAM, URINE: NOT DETECTED
MARIJUANA METAB, UR: NOT DETECTED
MDA, UR: NOT DETECTED
MDEA, EVE, UR: NOT DETECTED
MDMA URINE: NOT DETECTED
MEPERIDINE METAB, UR: NOT DETECTED
METHADONE, URINE: NOT DETECTED
METHAMPHETAMINE, URINE: NOT DETECTED
METHYLPHENIDATE: NOT DETECTED
MIDAZOLAM, URINE: NOT DETECTED
MORPHINE URINE: NOT DETECTED
NORBUPRENORPHINE, URINE: NOT DETECTED
NORDIAZEPAM, URINE: NOT DETECTED
NORFENTANYL, URINE: NOT DETECTED
NORHYDROCODONE, URINE: NOT DETECTED
NOROXYCODONE, URINE: PRESENT
NOROXYMORPHONE, URINE: PRESENT
OXAZEPAM, URINE: NOT DETECTED
OXYCODONE URINE: PRESENT
OXYMORPHONE, URINE: PRESENT
PAIN MGT DRUG PANEL, HI RES, UR: NORMAL
PCP,URINE: NOT DETECTED
PHENTERMINE, UR: NOT DETECTED
PROPOXYPHENE, URINE: NOT DETECTED
TAPENTADOL, URINE: NOT DETECTED
TAPENTADOL-O-SULFATE, URINE: NOT DETECTED
TEMAZEPAM, URINE: NOT DETECTED
TRAMADOL, URINE: NOT DETECTED
ZOLPIDEM, URINE: NOT DETECTED

## 2018-05-29 ENCOUNTER — HOSPITAL ENCOUNTER (OUTPATIENT)
Age: 43
Discharge: HOME | End: 2018-05-31
Payer: MEDICARE

## 2018-05-29 ENCOUNTER — HOSPITAL ENCOUNTER (OUTPATIENT)
Dept: GENERAL RADIOLOGY | Age: 43
Discharge: HOME | End: 2018-05-31
Payer: MEDICARE

## 2018-05-29 DIAGNOSIS — G89.29 CHRONIC PAIN OF LEFT ANKLE: ICD-10-CM

## 2018-05-29 DIAGNOSIS — M25.572 CHRONIC PAIN OF LEFT ANKLE: ICD-10-CM

## 2018-05-29 PROCEDURE — 73600 X-RAY EXAM OF ANKLE: CPT

## 2018-06-05 ENCOUNTER — HOSPITAL ENCOUNTER (OUTPATIENT)
Dept: PAIN MANAGEMENT | Age: 43
Discharge: HOME | End: 2018-06-05
Payer: MEDICARE

## 2018-06-05 DIAGNOSIS — G89.29 NECK PAIN, CHRONIC: Chronic | ICD-10-CM

## 2018-06-05 DIAGNOSIS — M51.26 DISPLACEMENT OF LUMBAR INTERVERTEBRAL DISC WITHOUT MYELOPATHY: Primary | ICD-10-CM

## 2018-06-05 DIAGNOSIS — Z51.81 ENCOUNTER FOR MEDICATION MONITORING: ICD-10-CM

## 2018-06-05 DIAGNOSIS — M54.2 NECK PAIN, CHRONIC: Chronic | ICD-10-CM

## 2018-06-05 PROCEDURE — 99214 OFFICE O/P EST MOD 30 MIN: CPT

## 2018-06-05 PROCEDURE — 99213 OFFICE O/P EST LOW 20 MIN: CPT | Performed by: NURSE PRACTITIONER

## 2018-06-05 RX ORDER — TRAMADOL HYDROCHLORIDE 50 MG/1
50 TABLET ORAL EVERY 8 HOURS PRN
Qty: 90 TABLET | Refills: 0 | Status: SHIPPED | OUTPATIENT
Start: 2018-06-06 | End: 2018-07-02 | Stop reason: SDUPTHER

## 2018-06-05 RX ORDER — GABAPENTIN 300 MG/1
300 CAPSULE ORAL EVERY 8 HOURS SCHEDULED
Qty: 90 CAPSULE | Refills: 0 | Status: SHIPPED | OUTPATIENT
Start: 2018-06-06 | End: 2018-07-02 | Stop reason: SDUPTHER

## 2018-06-05 ASSESSMENT — ENCOUNTER SYMPTOMS
SHORTNESS OF BREATH: 0
CONSTIPATION: 0
COUGH: 0
BACK PAIN: 1

## 2018-06-18 ENCOUNTER — TELEPHONE (OUTPATIENT)
Dept: INTERNAL MEDICINE | Age: 43
End: 2018-06-18

## 2018-06-26 DIAGNOSIS — E78.2 MIXED HYPERLIPIDEMIA: ICD-10-CM

## 2018-06-26 RX ORDER — ATORVASTATIN CALCIUM 40 MG/1
TABLET, FILM COATED ORAL
Qty: 90 TABLET | Refills: 3 | Status: SHIPPED | OUTPATIENT
Start: 2018-06-26

## 2018-07-02 ENCOUNTER — HOSPITAL ENCOUNTER (OUTPATIENT)
Dept: PAIN MANAGEMENT | Age: 43
Discharge: HOME | End: 2018-07-02
Payer: MEDICARE

## 2018-07-02 VITALS
SYSTOLIC BLOOD PRESSURE: 147 MMHG | BODY MASS INDEX: 31.07 KG/M2 | HEART RATE: 109 BPM | HEIGHT: 70 IN | WEIGHT: 217 LBS | TEMPERATURE: 98.5 F | DIASTOLIC BLOOD PRESSURE: 79 MMHG

## 2018-07-02 DIAGNOSIS — M51.26 DISPLACEMENT OF LUMBAR INTERVERTEBRAL DISC WITHOUT MYELOPATHY: Primary | ICD-10-CM

## 2018-07-02 DIAGNOSIS — Z51.81 ENCOUNTER FOR MEDICATION MONITORING: ICD-10-CM

## 2018-07-02 PROCEDURE — 99213 OFFICE O/P EST LOW 20 MIN: CPT | Performed by: NURSE PRACTITIONER

## 2018-07-02 PROCEDURE — 99214 OFFICE O/P EST MOD 30 MIN: CPT

## 2018-07-02 RX ORDER — GABAPENTIN 300 MG/1
300 CAPSULE ORAL EVERY 8 HOURS SCHEDULED
Qty: 90 CAPSULE | Refills: 0 | Status: SHIPPED | OUTPATIENT
Start: 2018-07-07 | End: 2018-08-02 | Stop reason: SDUPTHER

## 2018-07-02 RX ORDER — TRAMADOL HYDROCHLORIDE 50 MG/1
50 TABLET ORAL EVERY 8 HOURS PRN
Qty: 90 TABLET | Refills: 0 | Status: SHIPPED | OUTPATIENT
Start: 2018-07-11 | End: 2018-08-02 | Stop reason: SDUPTHER

## 2018-07-02 ASSESSMENT — ENCOUNTER SYMPTOMS
SHORTNESS OF BREATH: 0
BACK PAIN: 1
CONSTIPATION: 0
COUGH: 0

## 2018-07-02 NOTE — PROGRESS NOTES
Systems:  Review of Systems   Constitution: Positive for weakness. Negative for chills and fever. Cardiovascular: Negative for chest pain. Respiratory: Negative for cough and shortness of breath. Musculoskeletal: Positive for back pain. Gastrointestinal: Negative for constipation. Neurological: Positive for numbness and tingling. Physical Exam:  BP (!) 147/79   Pulse 109   Temp 98.5 °F (36.9 °C) (Oral)   Ht 5' 10\" (1.778 m)   Wt 217 lb (98.4 kg)   BMI 31.14 kg/m²     Physical Exam   Constitutional: He is oriented to person, place, and time and well-developed, well-nourished, and in no distress. Cardiovascular: Normal rate. Pulmonary/Chest: Effort normal.   Musculoskeletal: Normal range of motion. Neurological: He is alert and oriented to person, place, and time. Gait normal.   Antalgic gait using cane   Skin: Skin is warm and dry. Psychiatric: Affect normal.       Record/Diagnostics Review:    Last brett May  and was appropriate    MRI lumbar 2017 -      1. Degenerative changes contribute to mild spinal canal stenosis at L2-L3. No spinal canal stenosis at the remaining levels. 2. A disc protrusion contacts the left S1 nerve root at L5-S1 without  evidence of impingement. 3. Degenerative changes contribute to neural foraminal narrowing as above. Assessment:  Problem List Items Addressed This Visit     Encounter for medication monitoring (Chronic)    Displacement of lumbar intervertebral disc without myelopathy - Primary            Treatment Plan:  DISCUSSION: Treatment options discussed with patient and all questions answered to patient's satisfaction. Patient relates current medications are helping the pain. Patient reports taking pain medications as prescribed, denies obtaining medications from different sources and denies use of illegal drugs. Patient denies side effects from medications like nausea, vomiting, constipation or drowsiness.  Patient reports current activities of daily living are possible due to medications and would like to continue them. As always, we encourage daily stretching and strengthening exercises, and recommend minimizing use of pain medications unless patient cannot get through daily activities due to pain. Discussed with the patient the effect the patients medical condition and opioid medication may have on the patients ability to safely operate a vehicle. Pt verbalized understanding. TREATMENT OPTIONS:     Contract requirements met. Continue opioid therapy. Script written for tramadol and gabapentin  Pt is low risk with stress care.  Due Sept  for next evaluation  Follow up appointment made with MD for 4 weeks

## 2018-08-02 ENCOUNTER — HOSPITAL ENCOUNTER (OUTPATIENT)
Dept: PAIN MANAGEMENT | Age: 43
Discharge: HOME | End: 2018-08-02
Payer: MEDICARE

## 2018-08-02 VITALS
HEART RATE: 101 BPM | TEMPERATURE: 98.8 F | RESPIRATION RATE: 16 BRPM | SYSTOLIC BLOOD PRESSURE: 134 MMHG | DIASTOLIC BLOOD PRESSURE: 75 MMHG

## 2018-08-02 DIAGNOSIS — M51.26 DISPLACEMENT OF LUMBAR INTERVERTEBRAL DISC WITHOUT MYELOPATHY: ICD-10-CM

## 2018-08-02 DIAGNOSIS — Z51.81 ENCOUNTER FOR MEDICATION MONITORING: ICD-10-CM

## 2018-08-02 PROCEDURE — 99214 OFFICE O/P EST MOD 30 MIN: CPT | Performed by: PAIN MEDICINE

## 2018-08-02 PROCEDURE — 99213 OFFICE O/P EST LOW 20 MIN: CPT

## 2018-08-02 RX ORDER — TRAMADOL HYDROCHLORIDE 50 MG/1
50 TABLET ORAL EVERY 8 HOURS PRN
Qty: 90 TABLET | Refills: 0 | Status: SHIPPED | OUTPATIENT
Start: 2018-08-13 | End: 2018-09-05 | Stop reason: SDUPTHER

## 2018-08-02 RX ORDER — GABAPENTIN 300 MG/1
300 CAPSULE ORAL EVERY 8 HOURS SCHEDULED
Qty: 90 CAPSULE | Refills: 0 | Status: SHIPPED | OUTPATIENT
Start: 2018-08-09 | End: 2018-09-05 | Stop reason: SDUPTHER

## 2018-08-02 ASSESSMENT — ENCOUNTER SYMPTOMS
ABDOMINAL PAIN: 0
BOWEL INCONTINENCE: 0
BACK PAIN: 1

## 2018-08-02 NOTE — PROGRESS NOTES
lidocaine (LMX) 4 % cream, Apply topically as needed for Pain Apply topically as needed. , Disp: , Rfl:     acetaminophen (TYLENOL) 325 MG tablet, Take 650 mg by mouth as needed for Pain., Disp: , Rfl:     Family History   Problem Relation Age of Onset    Breast Cancer Mother     Liver Cancer Mother     Other Father         brain cysts       Social History     Social History    Marital status:      Spouse name: N/A    Number of children: N/A    Years of education: N/A     Occupational History    Not on file. Social History Main Topics    Smoking status: Current Every Day Smoker     Packs/day: 1.00     Years: 22.00     Types: Cigarettes    Smokeless tobacco: Former User     Types: Snuff     Quit date: 7/26/1991      Comment: 1/2 to 1 ppd    Alcohol use No    Drug use: No    Sexual activity: Not on file     Other Topics Concern    Not on file     Social History Narrative    No narrative on file       Review of Systems:  Review of Systems   Constitution: Positive for weakness. Negative for fever. Cardiovascular: Negative for chest pain. Musculoskeletal: Positive for back pain. Gastrointestinal: Negative for abdominal pain and bowel incontinence. Genitourinary: Negative for bladder incontinence. Neurological: Positive for headaches. Negative for tingling. Physical Exam:  /75   Pulse 101   Temp 98.8 °F (37.1 °C)   Resp 16     Physical Exam   Constitutional: He is oriented to person, place, and time and well-developed, well-nourished, and in no distress. Musculoskeletal:        Lumbar back: He exhibits tenderness. He exhibits no edema and no deformity. Neurological: He is alert and oriented to person, place, and time. He has normal strength and normal reflexes. He displays no weakness. Gait normal.   Vitals reviewed.   + facet loading    Record/Diagnostics Review:    As above, I did review the imaging    Assessment:  Lumbosacral spondylosis  Cervicalgia  Left ankle

## 2018-09-05 ENCOUNTER — HOSPITAL ENCOUNTER (OUTPATIENT)
Dept: PAIN MANAGEMENT | Age: 43
Discharge: HOME | End: 2018-09-05
Payer: MEDICARE

## 2018-09-05 VITALS
HEART RATE: 107 BPM | RESPIRATION RATE: 16 BRPM | SYSTOLIC BLOOD PRESSURE: 124 MMHG | DIASTOLIC BLOOD PRESSURE: 77 MMHG | TEMPERATURE: 98.4 F

## 2018-09-05 DIAGNOSIS — M51.26 DISPLACEMENT OF LUMBAR INTERVERTEBRAL DISC WITHOUT MYELOPATHY: Primary | ICD-10-CM

## 2018-09-05 DIAGNOSIS — Z51.81 ENCOUNTER FOR MEDICATION MONITORING: Chronic | ICD-10-CM

## 2018-09-05 PROCEDURE — 99213 OFFICE O/P EST LOW 20 MIN: CPT

## 2018-09-05 PROCEDURE — 99213 OFFICE O/P EST LOW 20 MIN: CPT | Performed by: NURSE PRACTITIONER

## 2018-09-05 PROCEDURE — 99214 OFFICE O/P EST MOD 30 MIN: CPT

## 2018-09-05 RX ORDER — TRAMADOL HYDROCHLORIDE 50 MG/1
50 TABLET ORAL EVERY 8 HOURS PRN
Qty: 78 TABLET | Refills: 0 | Status: SHIPPED | OUTPATIENT
Start: 2018-09-08 | End: 2018-10-09 | Stop reason: SDUPTHER

## 2018-09-05 RX ORDER — CYCLOBENZAPRINE HCL 10 MG
10 TABLET ORAL 2 TIMES DAILY PRN
Qty: 60 TABLET | Refills: 6 | Status: SHIPPED | OUTPATIENT
Start: 2018-09-08 | End: 2019-09-30 | Stop reason: SDUPTHER

## 2018-09-05 RX ORDER — GABAPENTIN 300 MG/1
300 CAPSULE ORAL EVERY 8 HOURS SCHEDULED
Qty: 90 CAPSULE | Refills: 0 | Status: SHIPPED | OUTPATIENT
Start: 2018-09-08 | End: 2018-10-09 | Stop reason: SDUPTHER

## 2018-09-05 ASSESSMENT — ENCOUNTER SYMPTOMS
SHORTNESS OF BREATH: 0
CONSTIPATION: 0
BOWEL INCONTINENCE: 0
COUGH: 0
BACK PAIN: 1

## 2018-09-05 NOTE — BH NOTE
4) Current Medications:   Prior to Admission medications    Medication Sig Start Date End Date Taking? Authorizing Provider   traMADol (ULTRAM) 50 MG tablet Take 1 tablet by mouth every 8 hours as needed for Pain for up to 30 days. . 8/13/18 9/12/18  Luis E Duval MD   gabapentin (NEURONTIN) 300 MG capsule Take 1 capsule by mouth every 8 hours for 30 days. . 8/9/18 9/8/18  Luis E Duval MD   atorvastatin (LIPITOR) 40 MG tablet TAKE ONE TABLET BY MOUTH ONE TIME A DAY 6/26/18   Tila Baez MD   allopurinol (ZYLOPRIM) 100 MG tablet Take 1 tablet by mouth daily 3/8/18   Jessica Shea MD   cyclobenzaprine (FLEXERIL) 10 MG tablet Take 10 mg by mouth 2 times daily as needed for Muscle spasms    Historical Provider, MD   docusate sodium (COLACE) 100 MG capsule Take 1 capsule by mouth daily as needed for Constipation 10/19/17   Jessica Shea MD   lidocaine (LMX) 4 % cream Apply topically as needed for Pain Apply topically as needed. Historical Provider, MD   acetaminophen (TYLENOL) 325 MG tablet Take 650 mg by mouth as needed for Pain. Historical Provider, MD       5) Other substance Use:  Reported today: Described alcohol consumption as occurring never. Reported that last alcohol consumption was about 2008. Patient reported use of nicotine products, current use is: under a half pack a day. Caffeine use is 4 cups per day. Patient denied current illicit drug use. Patient denied history of alcohol or drug-related issues. Patient denied history of alcohol or drug-related treatment. The patient states that they do not run out of pain medication early at the end of the month. Patient does not report a desire to take medication in higher doses or more frequently than prescribed, compulsive use, loss of control, and continued use despite harm. 6) He reports that they do not currently engage in counseling services. \"I'm shopping for someone. \"  He was put into \"open clinic\" due to missed

## 2018-09-05 NOTE — PROGRESS NOTES
Patient is here today to review medication contract. Chief Complaint: back pain    PMH: Patient complains of low back pain since 3432-3502 with no known injury. He uses a cane for stability but states this is due to foot/ankle pain not back pain - he did have a fracture in the left ankle the past.  He recevies duramorph injections about every 8-9 months with good relief. His last one was 10/23/17 with ongoing relief. He is also using TENS unit and OTC lidocaine cream with good relief. He has never had back surgery. He is gradually increasing exercise as tolerated and does try to walk 4x week and get 3400 steps a day. He had complaints of neck pain. He has finished PT for the neck and states it helped significantly and continues stretches on his own. Patient states tramadol is managing his pain very well. Back Pain   This is a chronic problem. The current episode started more than 1 year ago. The problem occurs constantly. The problem is unchanged. The pain is present in the lumbar spine. The quality of the pain is described as aching. The pain radiates to the right knee and left knee (right greater than left ). The pain is at a severity of 1/10. The pain is mild. The symptoms are aggravated by standing, sitting and bending. Pertinent negatives include no bladder incontinence, bowel incontinence, chest pain, fever, leg pain, numbness or tingling. Risk factors include sedentary lifestyle. He has tried ice and heat for the symptoms. The treatment provided mild relief. Patient denies any new neurological symptoms. No bowel or bladder incontinence, no weakness, and no falling. Pill count: appropriate    Morphine equivalent: 15    Controlled Substances Monitoring:     RX Monitoring 9/5/2018   Attestation The Prescription Monitoring Report for this patient was reviewed today. Documentation Possible medication side effects, risk of tolerance/dependence & alternative treatments discussed. ;No signs of Exam:  /77   Pulse 107   Temp 98.4 °F (36.9 °C)   Resp 16     Physical Exam   Constitutional: He is oriented to person, place, and time and well-developed, well-nourished, and in no distress. HENT:   Head: Normocephalic. Eyes: EOM are normal.   Neck: Normal range of motion. Pulmonary/Chest: Effort normal.   Musculoskeletal: Normal range of motion. Lumbar back: He exhibits tenderness and pain. Neurological: He is alert and oriented to person, place, and time. Skin: Skin is warm and dry.    Psychiatric: Affect normal.       Record/Diagnostics Review:    MRI Lumbar 2017 -     FINDINGS:  BONES/ALIGNMENT: The vertebral body heights appear maintained.  There is  straightening of the normal lumbar lordosis.  No evidence of  spondylolisthesis.  Minimal disc desiccation at L4-L5 and L5-S1.  The bone  marrow signal demonstrates no acute abnormality.     SPINAL CORD: The conus terminates normally.     SOFT TISSUES: No paraspinal mass identified.     L1-L2: There is a disc bulge flattening the ventral thecal sac without  significant spinal canal stenosis.  No neural foraminal narrowing.     L2-L3: There is a disc bulge indenting on the ventral thecal sac with minimal  bilateral facet arthrosis contributing to mild spinal canal stenosis.  No  neural foraminal narrowing.  This has progressed from the prior exam.     L3-L4: There is a disc bulge flattening the ventral thecal sac without  significant spinal canal stenosis.  No neural foraminal narrowing.  This is  minimally progressed from the prior exam.     L4-L5: There is a disc bulge flattening the ventral thecal sac without  significant spinal canal stenosis.  Minimal bilateral facet arthrosis  contributes to mild bilateral neural foraminal narrowing.  Findings appear  similar to the prior exam.     L5-S1: There is a disc bulge with a superimposed left paracentral disc  protrusion, which appears to contact the left S1 nerve root.  No evidence

## 2018-09-26 ENCOUNTER — TELEPHONE (OUTPATIENT)
Dept: INTERNAL MEDICINE | Age: 43
End: 2018-09-26

## 2018-10-09 ENCOUNTER — HOSPITAL ENCOUNTER (OUTPATIENT)
Dept: PAIN MANAGEMENT | Age: 43
Discharge: HOME | End: 2018-10-09
Payer: MEDICARE

## 2018-10-09 VITALS
DIASTOLIC BLOOD PRESSURE: 78 MMHG | TEMPERATURE: 99.3 F | RESPIRATION RATE: 16 BRPM | SYSTOLIC BLOOD PRESSURE: 115 MMHG | HEART RATE: 96 BPM

## 2018-10-09 DIAGNOSIS — M54.2 NECK PAIN, CHRONIC: Primary | Chronic | ICD-10-CM

## 2018-10-09 DIAGNOSIS — M51.26 DISPLACEMENT OF LUMBAR INTERVERTEBRAL DISC WITHOUT MYELOPATHY: ICD-10-CM

## 2018-10-09 DIAGNOSIS — Z51.81 ENCOUNTER FOR MEDICATION MONITORING: Chronic | ICD-10-CM

## 2018-10-09 DIAGNOSIS — G89.29 NECK PAIN, CHRONIC: Primary | Chronic | ICD-10-CM

## 2018-10-09 PROCEDURE — 99214 OFFICE O/P EST MOD 30 MIN: CPT

## 2018-10-09 PROCEDURE — 99213 OFFICE O/P EST LOW 20 MIN: CPT | Performed by: NURSE PRACTITIONER

## 2018-10-09 RX ORDER — GABAPENTIN 300 MG/1
300 CAPSULE ORAL EVERY 8 HOURS SCHEDULED
Qty: 90 CAPSULE | Refills: 0 | Status: SHIPPED | OUTPATIENT
Start: 2018-10-10 | End: 2018-11-08 | Stop reason: SDUPTHER

## 2018-10-09 RX ORDER — TRAMADOL HYDROCHLORIDE 50 MG/1
50 TABLET ORAL EVERY 8 HOURS PRN
Qty: 90 TABLET | Refills: 0 | Status: SHIPPED | OUTPATIENT
Start: 2018-10-10 | End: 2018-11-08 | Stop reason: SDUPTHER

## 2018-10-09 ASSESSMENT — PAIN DESCRIPTION - PROGRESSION: CLINICAL_PROGRESSION: NOT CHANGED

## 2018-10-09 ASSESSMENT — PAIN SCALES - GENERAL: PAINLEVEL_OUTOF10: 1

## 2018-10-09 ASSESSMENT — PAIN DESCRIPTION - ORIENTATION: ORIENTATION: LOWER

## 2018-10-09 ASSESSMENT — PAIN DESCRIPTION - LOCATION: LOCATION: BACK

## 2018-10-09 ASSESSMENT — PAIN DESCRIPTION - PAIN TYPE: TYPE: CHRONIC PAIN

## 2018-10-09 ASSESSMENT — PAIN DESCRIPTION - FREQUENCY: FREQUENCY: CONTINUOUS

## 2018-10-09 ASSESSMENT — PAIN DESCRIPTION - ONSET: ONSET: ON-GOING

## 2018-10-09 ASSESSMENT — ENCOUNTER SYMPTOMS
COUGH: 0
SHORTNESS OF BREATH: 0
BACK PAIN: 1
CONSTIPATION: 0

## 2018-10-09 NOTE — PROGRESS NOTES
Patient is here today to review medication contract. Chief Complaint: back pain    PMH: Patient complains of low back pain since [id number] with no known injury. He uses a cane for stability but states this is due to foot/ankle pain not back pain - he did have a fracture in the left ankle the past.  He recevies duramorph injections about every 8-9 months with good relief. His last one was 10/23/17 with ongoing relief. He is also using TENS unit and OTC lidocaine cream with good relief. He has never had back surgery. He is gradually increasing exercise as tolerated and does try to walk 4x week and get 3400 steps a day. He had complaints of neck pain. He has finished PT for the neck and states it helped significantly and continues stretches on his own.  Patient states tramadol is managing his pain very well. Back Pain   This is a chronic problem. The current episode started more than 1 year ago. The problem occurs constantly. The problem is unchanged. The pain is present in the lumbar spine. The quality of the pain is described as shooting, stabbing and burning. Radiates to: to hips. The pain is at a severity of 1/10. The pain is mild. The pain is worse during the day. The symptoms are aggravated by standing. Stiffness is present all day. Pertinent negatives include no chest pain or fever. He has tried analgesics, heat, ice, home exercises, muscle relaxant and NSAIDs for the symptoms. The treatment provided moderate relief. Patient denies any new neurological symptoms. No bowel or bladder incontinence, no weakness, and no falling. Pill count: appropriate    Morphine equivalent: 15    Controlled Substances Monitoring:     RX Monitoring 10/9/2018   Attestation The Prescription Monitoring Report for this patient was reviewed today. Documentation Possible medication side effects, risk of tolerance/dependence & alternative treatments discussed. ;No signs of potential drug abuse or diversion identified. Displacement of lumbar intervertebral disc without myelopathy          Treatment Plan:  DISCUSSION: Treatment options discussed with patient and all questions answered to patient's satisfaction. Patient relates current medications are helping the pain. Patient reports taking pain medications as prescribed, denies obtaining medications from different sources and denies use of illegal drugs. Patient denies side effects from medications like nausea, vomiting, constipation or drowsiness. Patient reports current activities of daily living are possible due to medications and would like to continue them. As always, we encourage daily stretching and strengthening exercises, and recommend minimizing use of pain medications unless patient cannot get through daily activities due to pain. TREATMENT OPTIONS:   Contract requirements met. Continue opioid therapy. Script written for tramadol  Pt is medium risk with stress care.  Due 12/2018 for next evaluation  Follow up appointment made for 4 weeks

## 2018-11-08 ENCOUNTER — HOSPITAL ENCOUNTER (OUTPATIENT)
Dept: PAIN MANAGEMENT | Age: 43
Discharge: HOME | End: 2018-11-08
Payer: MEDICARE

## 2018-11-08 VITALS
SYSTOLIC BLOOD PRESSURE: 131 MMHG | BODY MASS INDEX: 30.35 KG/M2 | HEART RATE: 109 BPM | RESPIRATION RATE: 16 BRPM | WEIGHT: 212 LBS | DIASTOLIC BLOOD PRESSURE: 84 MMHG | TEMPERATURE: 98.5 F | HEIGHT: 70 IN

## 2018-11-08 DIAGNOSIS — M51.26 DISPLACEMENT OF LUMBAR INTERVERTEBRAL DISC WITHOUT MYELOPATHY: ICD-10-CM

## 2018-11-08 DIAGNOSIS — Z51.81 ENCOUNTER FOR MEDICATION MONITORING: ICD-10-CM

## 2018-11-08 PROCEDURE — 99214 OFFICE O/P EST MOD 30 MIN: CPT

## 2018-11-08 PROCEDURE — 99213 OFFICE O/P EST LOW 20 MIN: CPT | Performed by: NURSE PRACTITIONER

## 2018-11-08 RX ORDER — GABAPENTIN 300 MG/1
300 CAPSULE ORAL EVERY 8 HOURS SCHEDULED
Qty: 90 CAPSULE | Refills: 0 | Status: SHIPPED | OUTPATIENT
Start: 2018-11-08 | End: 2019-03-06 | Stop reason: SDUPTHER

## 2018-11-08 RX ORDER — TRAMADOL HYDROCHLORIDE 50 MG/1
50 TABLET ORAL EVERY 8 HOURS PRN
Qty: 90 TABLET | Refills: 0 | Status: SHIPPED | OUTPATIENT
Start: 2018-11-17 | End: 2018-12-12 | Stop reason: SDUPTHER

## 2018-11-08 ASSESSMENT — PAIN DESCRIPTION - PROGRESSION: CLINICAL_PROGRESSION: NOT CHANGED

## 2018-11-08 ASSESSMENT — PAIN SCALES - GENERAL: PAINLEVEL_OUTOF10: 2

## 2018-11-08 ASSESSMENT — PAIN DESCRIPTION - FREQUENCY: FREQUENCY: CONTINUOUS

## 2018-11-08 ASSESSMENT — PAIN DESCRIPTION - LOCATION: LOCATION: BACK;LEG;KNEE

## 2018-11-08 ASSESSMENT — ENCOUNTER SYMPTOMS
CONSTIPATION: 0
COUGH: 0
SHORTNESS OF BREATH: 0
BACK PAIN: 1

## 2018-11-08 ASSESSMENT — PAIN DESCRIPTION - ORIENTATION: ORIENTATION: RIGHT;LEFT;LOWER

## 2018-11-08 ASSESSMENT — PAIN DESCRIPTION - ONSET: ONSET: ON-GOING

## 2018-11-08 ASSESSMENT — PAIN DESCRIPTION - PAIN TYPE: TYPE: CHRONIC PAIN

## 2018-12-07 RX ORDER — GABAPENTIN 300 MG/1
300 CAPSULE ORAL EVERY 8 HOURS SCHEDULED
Qty: 90 CAPSULE | Refills: 0 | Status: SHIPPED | OUTPATIENT
Start: 2018-12-07 | End: 2019-01-10 | Stop reason: SDUPTHER

## 2018-12-12 ENCOUNTER — HOSPITAL ENCOUNTER (OUTPATIENT)
Dept: PAIN MANAGEMENT | Age: 43
Discharge: HOME | End: 2018-12-12
Payer: COMMERCIAL

## 2018-12-12 VITALS
DIASTOLIC BLOOD PRESSURE: 77 MMHG | BODY MASS INDEX: 30.35 KG/M2 | SYSTOLIC BLOOD PRESSURE: 112 MMHG | WEIGHT: 212 LBS | TEMPERATURE: 98.2 F | RESPIRATION RATE: 16 BRPM | HEIGHT: 70 IN | HEART RATE: 107 BPM

## 2018-12-12 DIAGNOSIS — G89.29 CHRONIC BILATERAL LOW BACK PAIN WITH BILATERAL SCIATICA: Primary | ICD-10-CM

## 2018-12-12 DIAGNOSIS — M51.26 DISPLACEMENT OF LUMBAR INTERVERTEBRAL DISC WITHOUT MYELOPATHY: ICD-10-CM

## 2018-12-12 DIAGNOSIS — M54.41 CHRONIC BILATERAL LOW BACK PAIN WITH BILATERAL SCIATICA: Primary | ICD-10-CM

## 2018-12-12 DIAGNOSIS — Z51.81 ENCOUNTER FOR MEDICATION MONITORING: ICD-10-CM

## 2018-12-12 DIAGNOSIS — M54.42 CHRONIC BILATERAL LOW BACK PAIN WITH BILATERAL SCIATICA: Primary | ICD-10-CM

## 2018-12-12 PROCEDURE — 99214 OFFICE O/P EST MOD 30 MIN: CPT | Performed by: NURSE PRACTITIONER

## 2018-12-12 PROCEDURE — 80307 DRUG TEST PRSMV CHEM ANLYZR: CPT

## 2018-12-12 PROCEDURE — 99213 OFFICE O/P EST LOW 20 MIN: CPT

## 2018-12-12 PROCEDURE — 99214 OFFICE O/P EST MOD 30 MIN: CPT

## 2018-12-12 RX ORDER — TRAMADOL HYDROCHLORIDE 50 MG/1
50 TABLET ORAL EVERY 8 HOURS PRN
Qty: 90 TABLET | Refills: 0 | Status: SHIPPED | OUTPATIENT
Start: 2018-12-26 | End: 2019-01-24 | Stop reason: SDUPTHER

## 2018-12-12 ASSESSMENT — ENCOUNTER SYMPTOMS
CONSTIPATION: 0
BACK PAIN: 1
SHORTNESS OF BREATH: 0
COUGH: 0

## 2018-12-12 NOTE — BH NOTE
Nelly Tatum, APRN - CNP   gabapentin (NEURONTIN) 300 MG capsule Take 1 capsule by mouth every 8 hours for 30 days. . 11/8/18 12/8/18  Misti Arthur APRN - CNP   traMADol (ULTRAM) 50 MG tablet Take 1 tablet by mouth every 8 hours as needed for Pain for up to 30 days. . 11/17/18 12/17/18  Misti Arthur APRN - CNP   atorvastatin (LIPITOR) 40 MG tablet TAKE ONE TABLET BY MOUTH ONE TIME A DAY 6/26/18   [place] MD Lucrecia   allopurinol (ZYLOPRIM) 100 MG tablet Take 1 tablet by mouth daily 3/8/18   Yoan Olguin MD   cyclobenzaprine (FLEXERIL) 10 MG tablet Take 10 mg by mouth 2 times daily as needed for Muscle spasms    Historical Provider, MD   docusate sodium (COLACE) 100 MG capsule Take 1 capsule by mouth daily as needed for Constipation 10/19/17   Yoan Olguin MD   lidocaine (LMX) 4 % cream Apply topically as needed for Pain Apply topically as needed. Historical Provider, MD   acetaminophen (TYLENOL) 325 MG tablet Take 650 mg by mouth as needed for Pain. Historical Provider, MD       5) Other substance Use:  Reported today: Described alcohol consumption as occurring never. Reported that last alcohol consumption was 2007. Patient reported use of nicotine products, current use is: under a half pack per day. Caffeine use is 6-8 cups per day. Patient denied current illicit drug use. Patient denied history of alcohol or drug-related issues. Patient denied history of alcohol or drug-related treatment. The patient states that they do not run out of pain medication early at the end of the month. Patient does not report a desire to take medication in higher doses or more frequently than prescribed, compulsive use, loss of control, and continued use despite harm. Review of file for past 12 months shows     6) He reports that they do not currently engage in counseling services. Further they reported a history of mental health treatment and reported a history of psychiatric hospitalization.      7)

## 2018-12-12 NOTE — PROGRESS NOTES
Patient is here today to review medication contract. Chief Complaint: back pain    PMH: Patient complains of low back pain since [id number] with no known injury. He uses a cane for stability but states this is due to foot/ankle pain not back pain - he did have a fracture in the left ankle the past.  He recevies duramorph injections about every 8-9 months with good relief. His last one was 10/23/17 with ongoing relief. He is also using TENS unit and OTC lidocaine cream with good relief. He has never had back surgery. He is gradually increasing exercise as tolerated and does try to walk 4x week and get 3400 steps a day. He had complaints of neck pain. He has finished PT for the neck and states it helped significantly and continues stretches on his own.  Patient states tramadol is managing his pain very well - he takes it PRN. Back Pain   This is a chronic problem. The current episode started more than 1 year ago. The problem occurs constantly. The problem is unchanged. The pain is present in the lumbar spine. The quality of the pain is described as aching and burning. The pain radiates to the left knee and right knee. The pain is at a severity of 1/10. The pain is mild. Worse during: depends on activity. The symptoms are aggravated by bending, twisting, sitting, standing, position and stress. Stiffness is present in the morning and at night. Pertinent negatives include no chest pain or fever. He has tried analgesics, muscle relaxant, heat, home exercises and walking for the symptoms. The treatment provided significant relief. Patient denies any new neurological symptoms. No bowel or bladder incontinence, no weakness, and no falling. Pill count: appropriate - has #39 tabs left from 11/18/18 refill    Morphine equivalent: 15    Controlled Substances Monitoring:     RX Monitoring 12/12/2018   Attestation The Prescription Monitoring Report for this patient was reviewed today.    Documentation Possible a superimposed left paracentral disc  protrusion, which appears to contact the left S1 nerve root.  No evidence of  impingement.  No significant spinal canal stenosis.  Bilateral facet  arthrosis contributes to mild right and moderate left neural foraminal  narrowing.  This is similar to the prior exam.    Last ROSSI 5/2018 - appropriate - repeated today    Assessment:  Problem List     Encounter for medication monitoring (Chronic)    Relevant Medications    traMADol (ULTRAM) 50 MG tablet    Other Relevant Orders    Pain Management Drug Screen    Displacement of lumbar intervertebral disc without myelopathy    Relevant Medications    traMADol (ULTRAM) 50 MG tablet    Other Relevant Orders    Pain Management Drug Screen    Chronic back pain - Primary    Relevant Medications    acetaminophen (TYLENOL) 325 MG tablet    lidocaine 1 % (PF) injection 5 mL (Completed)    cyclobenzaprine (FLEXERIL) 10 MG tablet    oxyCODONE (ROXICODONE) immediate release tablet 5 mg (Completed)    traMADol (ULTRAM) 50 MG tablet    gabapentin (NEURONTIN) 300 MG capsule    Other Relevant Orders    Pain Management Drug Screen        Treatment Plan:  DISCUSSION: Treatment options discussed with patient and allquestions answered to patient's satisfaction. Patient relates current medications are helping the pain. Patient reports taking pain medications as prescribed, denies obtaining medications from different sources and denies use of illegal drugs. Patient denies side effects from medications like nausea, vomiting, constipation or drowsiness. Patient reports current activities of daily living are possible due to medications and would like to continue them. As always, we encourage daily stretching and strengthening exercises, and recommend minimizing use of pain medications unless patient cannot get through daily activities due to pain. TREATMENT OPTIONS:   ROSSI  Contract requirements met. Continue opioid therapy.  Script written for tramadol  Pt is medium risk with stress care.  Due 3/2019 for next evaluation  Follow up appointment made for 4 weeks

## 2018-12-12 NOTE — PLAN OF CARE
Mr. Avel Cummings was seen for a reevaluation today. He will be maintanined at a care level of medium (yellow) due to his untreated Bipolar II disorder. He was told that when he returns in three months he needs to be set up with psychiatric and psychological care. He was referred for services at [facility]. He agrees with this requirement.

## 2018-12-15 LAB

## 2019-01-10 RX ORDER — GABAPENTIN 300 MG/1
300 CAPSULE ORAL EVERY 8 HOURS SCHEDULED
Qty: 90 CAPSULE | Refills: 0 | Status: SHIPPED | OUTPATIENT
Start: 2019-01-10 | End: 2019-01-24 | Stop reason: SDUPTHER

## 2019-01-24 ENCOUNTER — HOSPITAL ENCOUNTER (OUTPATIENT)
Dept: PAIN MANAGEMENT | Age: 44
Discharge: HOME | End: 2019-01-24
Payer: COMMERCIAL

## 2019-01-24 VITALS
RESPIRATION RATE: 18 BRPM | HEIGHT: 70 IN | WEIGHT: 212 LBS | BODY MASS INDEX: 30.35 KG/M2 | DIASTOLIC BLOOD PRESSURE: 78 MMHG | SYSTOLIC BLOOD PRESSURE: 122 MMHG | HEART RATE: 92 BPM

## 2019-01-24 DIAGNOSIS — Z51.81 ENCOUNTER FOR MEDICATION MONITORING: ICD-10-CM

## 2019-01-24 DIAGNOSIS — M51.26 DISPLACEMENT OF LUMBAR INTERVERTEBRAL DISC WITHOUT MYELOPATHY: Primary | ICD-10-CM

## 2019-01-24 PROCEDURE — 99213 OFFICE O/P EST LOW 20 MIN: CPT | Performed by: NURSE PRACTITIONER

## 2019-01-24 PROCEDURE — 99214 OFFICE O/P EST MOD 30 MIN: CPT

## 2019-01-24 RX ORDER — RISPERIDONE 2 MG/1
3 TABLET, FILM COATED ORAL 2 TIMES DAILY
COMMUNITY
End: 2022-04-07

## 2019-01-24 RX ORDER — GABAPENTIN 300 MG/1
300 CAPSULE ORAL EVERY 8 HOURS SCHEDULED
Qty: 90 CAPSULE | Refills: 0 | Status: SHIPPED | OUTPATIENT
Start: 2019-02-07 | End: 2019-04-12 | Stop reason: SDUPTHER

## 2019-01-24 RX ORDER — ESCITALOPRAM OXALATE 20 MG/1
20 TABLET ORAL DAILY
COMMUNITY
End: 2019-09-30 | Stop reason: ALTCHOICE

## 2019-01-24 RX ORDER — TRAMADOL HYDROCHLORIDE 50 MG/1
50 TABLET ORAL EVERY 8 HOURS PRN
Qty: 90 TABLET | Refills: 0 | Status: SHIPPED | OUTPATIENT
Start: 2019-02-07 | End: 2019-03-06 | Stop reason: SDUPTHER

## 2019-01-24 ASSESSMENT — ENCOUNTER SYMPTOMS
SHORTNESS OF BREATH: 0
CONSTIPATION: 0
BACK PAIN: 1
COUGH: 0

## 2019-03-06 ENCOUNTER — HOSPITAL ENCOUNTER (OUTPATIENT)
Dept: PAIN MANAGEMENT | Age: 44
Discharge: HOME | End: 2019-03-06
Payer: COMMERCIAL

## 2019-03-06 VITALS
DIASTOLIC BLOOD PRESSURE: 76 MMHG | RESPIRATION RATE: 16 BRPM | TEMPERATURE: 98.1 F | HEART RATE: 96 BPM | BODY MASS INDEX: 30.35 KG/M2 | HEIGHT: 70 IN | WEIGHT: 212 LBS | SYSTOLIC BLOOD PRESSURE: 107 MMHG

## 2019-03-06 DIAGNOSIS — Z51.81 ENCOUNTER FOR MEDICATION MONITORING: ICD-10-CM

## 2019-03-06 DIAGNOSIS — M51.26 DISPLACEMENT OF LUMBAR INTERVERTEBRAL DISC WITHOUT MYELOPATHY: Primary | ICD-10-CM

## 2019-03-06 PROCEDURE — 99213 OFFICE O/P EST LOW 20 MIN: CPT

## 2019-03-06 PROCEDURE — 99213 OFFICE O/P EST LOW 20 MIN: CPT | Performed by: NURSE PRACTITIONER

## 2019-03-06 PROCEDURE — 99214 OFFICE O/P EST MOD 30 MIN: CPT

## 2019-03-06 RX ORDER — TRAMADOL HYDROCHLORIDE 50 MG/1
50 TABLET ORAL EVERY 8 HOURS PRN
Qty: 90 TABLET | Refills: 0 | Status: SHIPPED | OUTPATIENT
Start: 2019-03-20 | End: 2019-04-12 | Stop reason: SDUPTHER

## 2019-03-06 RX ORDER — GABAPENTIN 300 MG/1
300 CAPSULE ORAL EVERY 8 HOURS SCHEDULED
Qty: 90 CAPSULE | Refills: 0 | Status: ON HOLD | OUTPATIENT
Start: 2019-03-11 | End: 2019-05-09

## 2019-03-06 ASSESSMENT — ENCOUNTER SYMPTOMS
CONSTIPATION: 0
SHORTNESS OF BREATH: 0
COUGH: 0
BACK PAIN: 1

## 2019-04-12 ENCOUNTER — HOSPITAL ENCOUNTER (OUTPATIENT)
Dept: PAIN MANAGEMENT | Age: 44
Discharge: HOME | End: 2019-04-12
Payer: COMMERCIAL

## 2019-04-12 ENCOUNTER — HOSPITAL ENCOUNTER (OUTPATIENT)
Age: 44
Discharge: HOME | End: 2019-04-14
Payer: COMMERCIAL

## 2019-04-12 ENCOUNTER — HOSPITAL ENCOUNTER (OUTPATIENT)
Dept: GENERAL RADIOLOGY | Age: 44
Discharge: HOME | End: 2019-04-14
Payer: COMMERCIAL

## 2019-04-12 VITALS
TEMPERATURE: 99.1 F | HEIGHT: 70 IN | SYSTOLIC BLOOD PRESSURE: 108 MMHG | RESPIRATION RATE: 16 BRPM | BODY MASS INDEX: 30.35 KG/M2 | HEART RATE: 101 BPM | DIASTOLIC BLOOD PRESSURE: 68 MMHG | WEIGHT: 212 LBS

## 2019-04-12 DIAGNOSIS — M47.817 LUMBOSACRAL SPONDYLOSIS WITHOUT MYELOPATHY: ICD-10-CM

## 2019-04-12 DIAGNOSIS — M51.26 DISPLACEMENT OF LUMBAR INTERVERTEBRAL DISC WITHOUT MYELOPATHY: ICD-10-CM

## 2019-04-12 DIAGNOSIS — M47.817 LUMBOSACRAL SPONDYLOSIS WITHOUT MYELOPATHY: Primary | ICD-10-CM

## 2019-04-12 DIAGNOSIS — Z51.81 ENCOUNTER FOR MEDICATION MONITORING: ICD-10-CM

## 2019-04-12 PROCEDURE — 72100 X-RAY EXAM L-S SPINE 2/3 VWS: CPT

## 2019-04-12 PROCEDURE — 99214 OFFICE O/P EST MOD 30 MIN: CPT | Performed by: PAIN MEDICINE

## 2019-04-12 PROCEDURE — 99215 OFFICE O/P EST HI 40 MIN: CPT

## 2019-04-12 PROCEDURE — 80307 DRUG TEST PRSMV CHEM ANLYZR: CPT

## 2019-04-12 RX ORDER — TRAMADOL HYDROCHLORIDE 50 MG/1
50 TABLET ORAL EVERY 8 HOURS PRN
Qty: 90 TABLET | Refills: 0 | Status: SHIPPED | OUTPATIENT
Start: 2019-04-19 | End: 2019-05-15 | Stop reason: SDUPTHER

## 2019-04-12 RX ORDER — GABAPENTIN 300 MG/1
300 CAPSULE ORAL EVERY 8 HOURS SCHEDULED
Qty: 90 CAPSULE | Refills: 0 | Status: SHIPPED | OUTPATIENT
Start: 2019-04-12 | End: 2019-05-15 | Stop reason: SDUPTHER

## 2019-04-12 ASSESSMENT — ENCOUNTER SYMPTOMS
COUGH: 0
BOWEL INCONTINENCE: 0
ABDOMINAL PAIN: 0
BACK PAIN: 1
BLURRED VISION: 0

## 2019-04-12 NOTE — PROGRESS NOTES
Medical History:   Diagnosis Date    Back pain, chronic     Bipolar 1 disorder ([facility] [address] 8/2/2013    goes to [facility]"" Bipolar 2 disorder ([facility] [address]     Bronchitis     Degenerative disc disease     Depression     Displacement of lumbar intervertebral disc without myelopathy 5/7/2013    Fibula fracture LEFT    History of gastric ulcer     History of suicidal tendencies     Lumbar pain with radiation down both legs     Lumbar stenosis     Non-union of fracture LEFT ANKLE    Osteoarthritis     Pneumonia     Sleep disturbance     Spider bite     on great toe on left    Spondylosis     Tenosynovitis of ankle LEFT       Past Surgical History:   Procedure Laterality Date    ADENOIDECTOMY      NERVE BLOCK  8/8/13    Duramorph, Celestone 9mg    NERVE BLOCK  4-24-14    duramorph epidural steroid block, celestone 9mg, morphine 1.25mg    NERVE BLOCK  12/19/2014    DURAMORPH - CELESTONE 9 MG- DURAMORPH  1.5MG    NERVE BLOCK  7/23/15    duramorph 1.5mg celestone 9mg    NERVE BLOCK  11/19/15    Tens-Empi Select    NERVE BLOCK  3-10-16    duramorph epidural steroid block duramorph 1.5 mg decadron 7.5 mg    NERVE BLOCK  12/12/2016    duramorph caudal epidural,decadron 7.5 mg, duramorph 1.5 mg    NERVE BLOCK  10/23/2017    duramorph, duramorph 1.5, decadron 10mg    TYMPANOSTOMY TUBE PLACEMENT      UPPER GASTROINTESTINAL ENDOSCOPY      x1    URETHRAL STRICTURE DILATATION         Allergies   Allergen Reactions    Celebrex [Celecoxib] Shortness Of Breath     CHEST PAIN    Penicillins Hives and Nausea And Vomiting         Current Outpatient Medications:     traMADol (ULTRAM) 50 MG tablet, Take 1 tablet by mouth every 8 hours as needed for Pain for up to 30 days. , Disp: 90 tablet, Rfl: 0    risperiDONE (RISPERDAL) 2 MG tablet, Take 2 mg by mouth nightly, Disp: , Rfl:     escitalopram (LEXAPRO) 20 MG tablet, Take 20 mg by mouth daily, Disp: , Rfl:     gabapentin (NEURONTIN) 300 MG capsule, Take 1 capsule by mouth every 8 hours for 30 days. ., Disp: 90 capsule, Rfl: 0    atorvastatin (LIPITOR) 40 MG tablet, TAKE ONE TABLET BY MOUTH ONE TIME A DAY, Disp: 90 tablet, Rfl: 3    cyclobenzaprine (FLEXERIL) 10 MG tablet, Take 10 mg by mouth 2 times daily as needed for Muscle spasms, Disp: , Rfl:     docusate sodium (COLACE) 100 MG capsule, Take 1 capsule by mouth daily as needed for Constipation, Disp: 60 capsule, Rfl: 3    lidocaine (LMX) 4 % cream, Apply topically as needed for Pain Apply topically as needed. , Disp: , Rfl:     acetaminophen (TYLENOL) 325 MG tablet, Take 650 mg by mouth as needed for Pain., Disp: , Rfl:     gabapentin (NEURONTIN) 300 MG capsule, Take 1 capsule by mouth every 8 hours for 30 days. , Disp: 90 capsule, Rfl: 0    Family History   Problem Relation Age of Onset    Breast Cancer Mother     Liver Cancer Mother     Other Father         brain cysts       Social History     Socioeconomic History    Marital status:      Spouse name: Not on file    Number of children: Not on file    Years of education: Not on file    Highest education level: Not on file   Occupational History    Not on file   Social Needs    Financial resource strain: Not on file    Food insecurity:     Worry: Not on file     Inability: Not on file    Transportation needs:     Medical: Not on file     Non-medical: Not on file   Tobacco Use    Smoking status: Current Every Day Smoker     Packs/day: 1.00     Years: 22.00     Pack years: 22.00     Types: Cigarettes    Smokeless tobacco: Former User     Types: Snuff     Quit date: 7/26/1991    Tobacco comment: 1/2 to 1 ppd   Substance and Sexual Activity    Alcohol use: No     Alcohol/week: 0.0 oz    Drug use: No    Sexual activity: Not on file   Lifestyle    Physical activity:     Days per week: Not on file     Minutes per session: Not on file    Stress: Not on file   Relationships    Social connections:     Talks on phone: Not on file     Gets together: Not on

## 2019-04-16 LAB

## 2019-05-02 ENCOUNTER — HOSPITAL ENCOUNTER (OUTPATIENT)
Dept: PAIN MANAGEMENT | Age: 44
Discharge: HOME | End: 2019-05-02
Payer: MEDICARE

## 2019-05-02 VITALS
OXYGEN SATURATION: 98 % | DIASTOLIC BLOOD PRESSURE: 67 MMHG | WEIGHT: 212 LBS | BODY MASS INDEX: 30.35 KG/M2 | HEIGHT: 70 IN | RESPIRATION RATE: 16 BRPM | SYSTOLIC BLOOD PRESSURE: 112 MMHG | TEMPERATURE: 97.7 F | HEART RATE: 82 BPM

## 2019-05-02 DIAGNOSIS — Z51.81 ENCOUNTER FOR MEDICATION MONITORING: ICD-10-CM

## 2019-05-02 PROCEDURE — 64493 INJ PARAVERT F JNT L/S 1 LEV: CPT | Performed by: PAIN MEDICINE

## 2019-05-02 PROCEDURE — 2580000003 HC RX 258: Performed by: PAIN MEDICINE

## 2019-05-02 PROCEDURE — 6360000002 HC RX W HCPCS: Performed by: PAIN MEDICINE

## 2019-05-02 PROCEDURE — 64494 INJ PARAVERT F JNT L/S 2 LEV: CPT | Performed by: PAIN MEDICINE

## 2019-05-02 PROCEDURE — 2500000003 HC RX 250 WO HCPCS: Performed by: PAIN MEDICINE

## 2019-05-02 PROCEDURE — 64493 INJ PARAVERT F JNT L/S 1 LEV: CPT

## 2019-05-02 PROCEDURE — 64494 INJ PARAVERT F JNT L/S 2 LEV: CPT

## 2019-05-02 RX ORDER — DEXAMETHASONE SODIUM PHOSPHATE 10 MG/ML
10 INJECTION, SOLUTION INTRAMUSCULAR; INTRAVENOUS
Status: ACTIVE | OUTPATIENT
Start: 2019-05-02 | End: 2019-05-02

## 2019-05-02 RX ORDER — BUPIVACAINE HYDROCHLORIDE 2.5 MG/ML
INJECTION, SOLUTION EPIDURAL; INFILTRATION; INTRACAUDAL
Status: COMPLETED | OUTPATIENT
Start: 2019-05-02 | End: 2019-05-02

## 2019-05-02 RX ORDER — BUPIVACAINE HYDROCHLORIDE 2.5 MG/ML
30 INJECTION, SOLUTION EPIDURAL; INFILTRATION; INTRACAUDAL
Status: ACTIVE | OUTPATIENT
Start: 2019-05-02 | End: 2019-05-02

## 2019-05-02 RX ORDER — FENTANYL CITRATE 50 UG/ML
100 INJECTION, SOLUTION INTRAMUSCULAR; INTRAVENOUS
Status: ACTIVE | OUTPATIENT
Start: 2019-05-02 | End: 2019-05-02

## 2019-05-02 RX ORDER — MIDAZOLAM HYDROCHLORIDE 1 MG/ML
2 INJECTION INTRAMUSCULAR; INTRAVENOUS
Status: ACTIVE | OUTPATIENT
Start: 2019-05-02 | End: 2019-05-02

## 2019-05-02 RX ORDER — SODIUM CHLORIDE, SODIUM LACTATE, POTASSIUM CHLORIDE, CALCIUM CHLORIDE 600; 310; 30; 20 MG/100ML; MG/100ML; MG/100ML; MG/100ML
INJECTION, SOLUTION INTRAVENOUS CONTINUOUS
Status: DISCONTINUED | OUTPATIENT
Start: 2019-05-02 | End: 2019-05-03 | Stop reason: HOSPADM

## 2019-05-02 RX ORDER — MIDAZOLAM HYDROCHLORIDE 1 MG/ML
INJECTION INTRAMUSCULAR; INTRAVENOUS
Status: COMPLETED | OUTPATIENT
Start: 2019-05-02 | End: 2019-05-02

## 2019-05-02 RX ADMIN — SODIUM CHLORIDE, POTASSIUM CHLORIDE, SODIUM LACTATE AND CALCIUM CHLORIDE: 600; 310; 30; 20 INJECTION, SOLUTION INTRAVENOUS at 10:21

## 2019-05-02 RX ADMIN — BUPIVACAINE HYDROCHLORIDE 6 ML: 2.5 INJECTION, SOLUTION EPIDURAL; INFILTRATION; INTRACAUDAL; PERINEURAL at 10:42

## 2019-05-02 RX ADMIN — MIDAZOLAM HYDROCHLORIDE 2 MG: 1 INJECTION, SOLUTION INTRAMUSCULAR; INTRAVENOUS at 10:38

## 2019-05-02 ASSESSMENT — PAIN - FUNCTIONAL ASSESSMENT: PAIN_FUNCTIONAL_ASSESSMENT: 0-10

## 2019-05-02 NOTE — OP NOTE
Lumbar Facet Nerve Block Injection:  Surgeon: Sherie Ferreira     PRE-OP DIAGNOSIS: M47.817 (lumbosacral spondylosis), M54.5 (low back pain)    POST-OP DIAGNOSIS: Same. PROCEDURE PERFORMED: Lumbar Facet Nerve Block Multiple Levels  Bilateral L4 - 5 and L5 - S1. Physician confirmed and marked the surgical site. ASA classification: 3                     Mallampati classification: 2    Pain rated as moderate    CONSENT: Patient has undergone the educational process with this procedure, is aware and fully understands the risks involved: potential damage to any and all body organs including possible bleeding, infection and nerve injury, allergic reaction and headache. Patient also understands that the procedure will be undertaken in a safe, controlled, and monitored setting. Patient recognizes that the benefits include relief from pain and reduction in the oral use of medications. Patient agreed to proceed. PREP: Timeout was performed prior to starting the procedure. The patient's back was prepped with chloroprep and draped appropriately. 5ml of 0.5% lidocaine was used to anesthetize the skin and subcutaneous tissue. PROCEDURE NOTE: A 22 gauge 3.5 inch spinal needle was advanced under  fluoroscopic guidance to the appropriate anatomic location for the medial branches corresponding to the facets at the base of the appropriate superior articular process and/or sacral ala . Aspiration was negative for blood, CSF and producing pain. 1 ml of 0.25% marcaine was then injected at each site to block medial branch nerve innervating the  Bilateral L4 - 5 and L5 - S1 facet joints. Patient tolerated the procedure well, no complications occurred. At the end of the injection the physician withdrew the needle and the nurse applied a sterile bandage to the site. Patient transferred to the recovery room in satisfactory condition. Appropriate written discharge instructions were given to the patient.  If good results

## 2019-05-02 NOTE — H&P
days., Disp: 90 capsule, Rfl: 0    risperiDONE (RISPERDAL) 2 MG tablet, Take 2 mg by mouth nightly, Disp: , Rfl:     escitalopram (LEXAPRO) 20 MG tablet, Take 20 mg by mouth daily, Disp: , Rfl:     atorvastatin (LIPITOR) 40 MG tablet, TAKE ONE TABLET BY MOUTH ONE TIME A DAY, Disp: 90 tablet, Rfl: 3    cyclobenzaprine (FLEXERIL) 10 MG tablet, Take 10 mg by mouth 2 times daily as needed for Muscle spasms, Disp: , Rfl:     docusate sodium (COLACE) 100 MG capsule, Take 1 capsule by mouth daily as needed for Constipation, Disp: 60 capsule, Rfl: 3    lidocaine (LMX) 4 % cream, Apply topically as needed for Pain Apply topically as needed. , Disp: , Rfl:     acetaminophen (TYLENOL) 325 MG tablet, Take 650 mg by mouth as needed for Pain., Disp: , Rfl:     gabapentin (NEURONTIN) 300 MG capsule, Take 1 capsule by mouth every 8 hours for 30 days. , Disp: 90 capsule, Rfl: 0    Social History     Tobacco Use    Smoking status: Current Every Day Smoker     Packs/day: 1.00     Years: 22.00     Pack years: 22.00     Types: Cigarettes    Smokeless tobacco: Former User     Types: Snuff     Quit date: 7/26/1991    Tobacco comment: 1/2 to 1 ppd   Substance Use Topics    Alcohol use: No     Alcohol/week: 0.0 oz       Review of Systems:   Focused review of systems was performed, and negative as pertinent to diagnosis, except as stated in HPI. Physical Exam:     /69   Pulse 91   Temp 97.7 °F (36.5 °C)   Resp 16   Ht 5' 10\" (1.778 m)   Wt 212 lb (96.2 kg)   SpO2 98%   BMI 30.42 kg/m²     Physical Exam   Constitutional: He is oriented to person, place, and time. Musculoskeletal:        Lumbar back: He exhibits tenderness. He exhibits no edema and no deformity. Neurological: He is alert and oriented to person, place, and time. Gait abnormal.   Vitals reviewed.      Patient's current physical status, medications, medical history, and HPI have been reviewed and updated as appropriate on this date: 05/02/19    Risk/Benefit(s): The risks, benefits, alternatives, and potential complications havebeen discussed with the patient/family and informed consent has been obtained for the procedure/sedation. Diagnosis:   Lumbar spondylosis      Plan: Bilateral L4-5, L5-S1 diagnostic lumbar facet block.         [address]

## 2019-05-09 ENCOUNTER — ANESTHESIA (OUTPATIENT)
Dept: OPERATING ROOM | Facility: CLINIC | Age: 44
End: 2019-05-09
Payer: MEDICARE

## 2019-05-09 ENCOUNTER — HOSPITAL ENCOUNTER (OUTPATIENT)
Facility: CLINIC | Age: 44
Setting detail: OUTPATIENT SURGERY
Discharge: HOME | End: 2019-05-09
Attending: PAIN MEDICINE | Admitting: PAIN MEDICINE
Payer: MEDICARE

## 2019-05-09 ENCOUNTER — ANESTHESIA EVENT (OUTPATIENT)
Dept: OPERATING ROOM | Facility: CLINIC | Age: 44
End: 2019-05-09
Payer: MEDICARE

## 2019-05-09 ENCOUNTER — HOSPITAL ENCOUNTER (OUTPATIENT)
Dept: GENERAL RADIOLOGY | Age: 44
Discharge: HOME | End: 2019-05-11
Payer: MEDICARE

## 2019-05-09 VITALS
OXYGEN SATURATION: 96 % | TEMPERATURE: 97.2 F | WEIGHT: 218 LBS | DIASTOLIC BLOOD PRESSURE: 77 MMHG | HEART RATE: 72 BPM | BODY MASS INDEX: 31.21 KG/M2 | SYSTOLIC BLOOD PRESSURE: 121 MMHG | RESPIRATION RATE: 7 BRPM | HEIGHT: 70 IN

## 2019-05-09 VITALS — DIASTOLIC BLOOD PRESSURE: 74 MMHG | OXYGEN SATURATION: 99 % | SYSTOLIC BLOOD PRESSURE: 114 MMHG

## 2019-05-09 DIAGNOSIS — R52 PAIN: ICD-10-CM

## 2019-05-09 PROCEDURE — 3600000002 HC SURGERY LEVEL 2 BASE: Performed by: PAIN MEDICINE

## 2019-05-09 PROCEDURE — 3600000012 HC SURGERY LEVEL 2 ADDTL 15MIN: Performed by: PAIN MEDICINE

## 2019-05-09 PROCEDURE — 3209999900 FLUORO FOR SURGICAL PROCEDURES

## 2019-05-09 PROCEDURE — 64494 INJ PARAVERT F JNT L/S 2 LEV: CPT | Performed by: PAIN MEDICINE

## 2019-05-09 PROCEDURE — 7100000010 HC PHASE II RECOVERY - FIRST 15 MIN: Performed by: PAIN MEDICINE

## 2019-05-09 PROCEDURE — 2709999900 HC NON-CHARGEABLE SUPPLY: Performed by: PAIN MEDICINE

## 2019-05-09 PROCEDURE — 7100000011 HC PHASE II RECOVERY - ADDTL 15 MIN: Performed by: PAIN MEDICINE

## 2019-05-09 PROCEDURE — 64493 INJ PARAVERT F JNT L/S 1 LEV: CPT | Performed by: PAIN MEDICINE

## 2019-05-09 PROCEDURE — 2500000003 HC RX 250 WO HCPCS: Performed by: PAIN MEDICINE

## 2019-05-09 PROCEDURE — 6360000002 HC RX W HCPCS: Performed by: NURSE ANESTHETIST, CERTIFIED REGISTERED

## 2019-05-09 PROCEDURE — 3700000000 HC ANESTHESIA ATTENDED CARE: Performed by: PAIN MEDICINE

## 2019-05-09 PROCEDURE — 3700000001 HC ADD 15 MINUTES (ANESTHESIA): Performed by: PAIN MEDICINE

## 2019-05-09 RX ORDER — SODIUM CHLORIDE, SODIUM LACTATE, POTASSIUM CHLORIDE, CALCIUM CHLORIDE 600; 310; 30; 20 MG/100ML; MG/100ML; MG/100ML; MG/100ML
INJECTION, SOLUTION INTRAVENOUS CONTINUOUS PRN
Status: DISCONTINUED | OUTPATIENT
Start: 2019-05-09 | End: 2019-05-09

## 2019-05-09 RX ORDER — MIDAZOLAM HYDROCHLORIDE 1 MG/ML
INJECTION INTRAMUSCULAR; INTRAVENOUS PRN
Status: DISCONTINUED | OUTPATIENT
Start: 2019-05-09 | End: 2019-05-09 | Stop reason: SDUPTHER

## 2019-05-09 RX ORDER — FENTANYL CITRATE 50 UG/ML
INJECTION, SOLUTION INTRAMUSCULAR; INTRAVENOUS PRN
Status: DISCONTINUED | OUTPATIENT
Start: 2019-05-09 | End: 2019-05-09 | Stop reason: SDUPTHER

## 2019-05-09 RX ORDER — SODIUM CHLORIDE 0.9 % (FLUSH) 0.9 %
10 SYRINGE (ML) INJECTION PRN
Status: DISCONTINUED | OUTPATIENT
Start: 2019-05-09 | End: 2020-07-06 | Stop reason: ALTCHOICE

## 2019-05-09 RX ORDER — BUPIVACAINE HYDROCHLORIDE 2.5 MG/ML
INJECTION, SOLUTION EPIDURAL; INFILTRATION; INTRACAUDAL PRN
Status: DISCONTINUED | OUTPATIENT
Start: 2019-05-09 | End: 2019-05-09 | Stop reason: ALTCHOICE

## 2019-05-09 RX ORDER — SODIUM CHLORIDE 0.9 % (FLUSH) 0.9 %
10 SYRINGE (ML) INJECTION EVERY 12 HOURS SCHEDULED
Status: DISCONTINUED | OUTPATIENT
Start: 2019-05-09 | End: 2020-07-06 | Stop reason: ALTCHOICE

## 2019-05-09 RX ADMIN — MIDAZOLAM HYDROCHLORIDE 2 MG: 1 INJECTION, SOLUTION INTRAMUSCULAR; INTRAVENOUS at 11:30

## 2019-05-09 RX ADMIN — FENTANYL CITRATE 100 MCG: 50 INJECTION INTRAMUSCULAR; INTRAVENOUS at 11:30

## 2019-05-09 ASSESSMENT — PULMONARY FUNCTION TESTS
PIF_VALUE: 0

## 2019-05-09 ASSESSMENT — PAIN SCALES - GENERAL: PAINLEVEL_OUTOF10: 1

## 2019-05-09 ASSESSMENT — PAIN DESCRIPTION - DESCRIPTORS: DESCRIPTORS: ACHING;BURNING;SHARP;STABBING

## 2019-05-09 ASSESSMENT — PAIN - FUNCTIONAL ASSESSMENT: PAIN_FUNCTIONAL_ASSESSMENT: 0-10

## 2019-05-09 ASSESSMENT — ENCOUNTER SYMPTOMS: SHORTNESS OF BREATH: 0

## 2019-05-09 NOTE — H&P
Pain Pre-Op H&P Note    Yakelin Ferreira    HPI: Sydnee Juarez  presents with Low back pain, did well with diagnostic facets, here for confirmatory block. Past Medical History:   Diagnosis Date    Back pain, chronic     Bipolar 1 disorder ([facility] [address] 8/2/2013    [facility] Bipolar 2 disorder ([facility] [address]     Bronchitis     Degenerative disc disease     Depression     Displacement of lumbar intervertebral disc without myelopathy 5/7/2013    Fibula fracture LEFT    History of gastric ulcer     History of suicidal tendencies     Hyperlipidemia     Lumbar pain with radiation down both legs     Lumbar stenosis     Non-union of fracture LEFT ANKLE    Osteoarthritis     Pneumonia     Sleep disturbance     Spider bite     on great toe on left    Spondylosis     Tenosynovitis of ankle LEFT       Past Surgical History:   Procedure Laterality Date    ADENOIDECTOMY      NERVE BLOCK  8/8/13    Duramorph, Celestone 9mg    NERVE BLOCK  4-24-14    duramorph epidural steroid block, celestone 9mg, morphine 1.25mg    NERVE BLOCK  12/19/2014    DURAMORPH - CELESTONE 9 MG- DURAMORPH  1.5MG    NERVE BLOCK  7/23/15    duramorph 1.5mg celestone 9mg    NERVE BLOCK  11/19/15    Tens-Empi Select    NERVE BLOCK  3-10-16    duramorph epidural steroid block duramorph 1.5 mg decadron 7.5 mg    NERVE BLOCK  12/12/2016    duramorph caudal epidural,decadron 7.5 mg, duramorph 1.5 mg    NERVE BLOCK  10/23/2017    duramorph, duramorph 1.5, decadron 10mg    NERVE BLOCK Bilateral 05/02/2019    bilat  medial branch nerve block   marcaine 1/4%    TYMPANOSTOMY TUBE PLACEMENT      UPPER GASTROINTESTINAL ENDOSCOPY      x1    URETHRAL STRICTURE DILATATION         Allergies   Allergen Reactions    Celebrex [Celecoxib] Shortness Of Breath     CHEST PAIN    Penicillins Hives and Nausea And Vomiting       No current facility-administered medications for this encounter.      Social History     Tobacco Use    Smoking status:

## 2019-05-09 NOTE — OP NOTE
Lumbar Facet Nerve Block Injection:  Surgeon: Sherie Ferreira     PRE-OP DIAGNOSIS: M47.817 (lumbosacral spondylosis), M54.5 (low back pain)    POST-OP DIAGNOSIS: Same. PROCEDURE PERFORMED: Lumbar Facet Nerve Block Multiple Levels  Bilateral L4 - 5 and L5 - S1. EBL: minimal    Physician confirmed and marked the surgical site. ASA classification: 3                     Mallampati classification: 2    Pain rated as moderate    CONSENT: Patient has undergone the educational process with this procedure, is aware and fully understands the risks involved: potential damage to any and all body organs including possible bleeding, infection and nerve injury, allergic reaction and headache. Patient also understands that the procedure will be undertaken in a safe, controlled, and monitored setting. Patient recognizes that the benefits include relief from pain and reduction in the oral use of medications. Patient agreed to proceed. PREP: Timeout was performed prior to starting the procedure. The patient's back was prepped with chloroprep and draped appropriately. 5ml of 0.5% lidocaine was used to anesthetize the skin and subcutaneous tissue. PROCEDURE NOTE: A 22 gauge 3.5 inch spinal needle was advanced under  fluoroscopic guidance to the appropriate anatomic location for the medial branches corresponding to the facets at the base of the appropriate superior articular process and/or sacral ala . Aspiration was negative for blood, CSF and producing pain. 1 ml of 0.25% marcaine was then injected at each site to block medial branch nerve innervating the  Bilateral L4 - 5 and L5 - S1 facet joints. Patient tolerated the procedure well, no complications occurred. At the end of the injection the physician withdrew the needle and the nurse applied a sterile bandage to the site. Patient transferred to the recovery room in satisfactory condition. Appropriate written discharge instructions were given to the patient.

## 2019-05-09 NOTE — ANESTHESIA PRE PROCEDURE
Department of Anesthesiology  Preprocedure Note       Name:  Matt Ocampo   Age:  37 y.o.  :  1975                                          MRN:  [id number]         Date:  2019      Surgeon: John Emery):  Mattie Casarez MD    Procedure: INJECTION FACET LUMBAR L4-S1 (N/A )    Medications prior to admission:   Prior to Admission medications    Medication Sig Start Date End Date Taking? Authorizing Provider   traMADol (ULTRAM) 50 MG tablet Take 1 tablet by mouth every 8 hours as needed for Pain for up to 30 days. 19 Yes Mattie Casarze MD   gabapentin (NEURONTIN) 300 MG capsule Take 1 capsule by mouth every 8 hours for 30 days. 19 Yes Mattie Casarez MD   risperiDONE (RISPERDAL) 2 MG tablet Take 2 mg by mouth nightly   Yes Historical Provider, MD   escitalopram (LEXAPRO) 20 MG tablet Take 20 mg by mouth daily   Yes Historical Provider, MD   atorvastatin (LIPITOR) 40 MG tablet TAKE ONE TABLET BY MOUTH ONE TIME A DAY 18  Yes Ban Guevara MD   cyclobenzaprine (FLEXERIL) 10 MG tablet Take 10 mg by mouth 2 times daily as needed for Muscle spasms   Yes Historical Provider, MD   docusate sodium (COLACE) 100 MG capsule Take 1 capsule by mouth daily as needed for Constipation 10/19/17  Yes Diann Shetty MD   lidocaine (LMX) 4 % cream Apply topically as needed for Pain Apply topically as needed. Yes Historical Provider, MD   acetaminophen (TYLENOL) 325 MG tablet Take 650 mg by mouth as needed for Pain. Yes Historical Provider, MD       Current medications:    No current facility-administered medications for this encounter. Allergies:     Allergies   Allergen Reactions    Celebrex [Celecoxib] Shortness Of Breath     CHEST PAIN    Penicillins Hives and Nausea And Vomiting       Problem List:    Patient Active Problem List   Diagnosis Code    Displacement of lumbar intervertebral disc without myelopathy M51.26    Bipolar 1 disorder (HCC) F31.9    Pain disorder R52    Chronic back pain M54.9, G89.29    Neck pain, chronic M54.2, G89.29    Encounter for medication monitoring Z51.81       Past Medical History:        Diagnosis Date    Back pain, chronic     Bipolar 1 disorder ([facility] [address] 8/2/2013    [facility] Bipolar 2 disorder ([facility] [address]     Bronchitis     Degenerative disc disease     Depression     Displacement of lumbar intervertebral disc without myelopathy 5/7/2013    Fibula fracture LEFT    History of gastric ulcer     History of suicidal tendencies     Hyperlipidemia     Lumbar pain with radiation down both legs     Lumbar stenosis     Non-union of fracture LEFT ANKLE    Osteoarthritis     Pneumonia     Sleep disturbance     Spider bite     on great toe on left    Spondylosis     Tenosynovitis of ankle LEFT       Past Surgical History:        Procedure Laterality Date    ADENOIDECTOMY      NERVE BLOCK  8/8/13    Duramorph, Celestone 9mg    NERVE BLOCK  4-24-14    duramorph epidural steroid block, celestone 9mg, morphine 1.25mg    NERVE BLOCK  12/19/2014    DURAMORPH - CELESTONE 9 MG- DURAMORPH  1.5MG    NERVE BLOCK  7/23/15    duramorph 1.5mg celestone 9mg    NERVE BLOCK  11/19/15    Tens-Empi Select    NERVE BLOCK  3-10-16    duramorph epidural steroid block duramorph 1.5 mg decadron 7.5 mg    NERVE BLOCK  12/12/2016    duramorph caudal epidural,decadron 7.5 mg, duramorph 1.5 mg    NERVE BLOCK  10/23/2017    duramorph, duramorph 1.5, decadron 10mg    NERVE BLOCK Bilateral 05/02/2019    bilat  medial branch nerve block   marcaine 1/4%    TYMPANOSTOMY TUBE PLACEMENT      UPPER GASTROINTESTINAL ENDOSCOPY      x1    URETHRAL STRICTURE DILATATION         Social History:    Social History     Tobacco Use    Smoking status: Former Smoker     Packs/day: 1.00     Years: 22.00     Pack years: 22.00     Types: Cigarettes    Smokeless tobacco: Former User     Types: Snuff     Quit date: 2/1/2019    Tobacco comment: 1/2 to 1 ppd Substance Use Topics    Alcohol use: No     Alcohol/week: 0.0 oz                                Counseling given: Not Answered  Comment: 1/2 to 1 ppd      Vital Signs (Current):   Vitals:    05/09/19 0947   BP: 108/70   Pulse: 83   Resp: 16   Temp: 97 °F (36.1 °C)   TempSrc: Temporal   SpO2: 96%   Weight: 218 lb (98.9 kg)   Height: 5' 10\" (1.778 m)                                              BP Readings from Last 3 Encounters:   05/09/19 108/70   05/02/19 112/67   04/12/19 108/68       NPO Status: Time of last liquid consumption: 2200                        Time of last solid consumption: 2200                        Date of last liquid consumption: 05/08/19                        Date of last solid food consumption: 05/08/19    BMI:   Wt Readings from Last 3 Encounters:   05/09/19 218 lb (98.9 kg)   05/02/19 212 lb (96.2 kg)   04/12/19 212 lb (96.2 kg)     Body mass index is 31.28 kg/m². CBC:   Lab Results   Component Value Date    WBC 11.8 01/31/2017    RBC 4.99 01/31/2017    HGB 15.5 01/31/2017    HCT 45.8 01/31/2017    MCV 91.8 01/31/2017    RDW 14.5 01/31/2017     01/31/2017       CMP:   Lab Results   Component Value Date     01/25/2018    K 3.9 01/25/2018     01/25/2018    CO2 22 01/25/2018    BUN 13 01/25/2018    CREATININE 1.34 01/25/2018    GFRAA >60 01/25/2018    LABGLOM 58 01/25/2018    GLUCOSE 89 01/25/2018    PROT 7.6 08/07/2017    CALCIUM 9.3 01/25/2018    BILITOT 0.46 01/31/2017    ALKPHOS 93 01/31/2017    AST 19 01/31/2017    ALT 28 01/31/2017       POC Tests: No results for input(s): POCGLU, POCNA, POCK, POCCL, POCBUN, POCHEMO, POCHCT in the last 72 hours.     Coags: No results found for: PROTIME, INR, APTT    HCG (If Applicable): No results found for: PREGTESTUR, PREGSERUM, HCG, HCGQUANT     ABGs: No results found for: PHART, PO2ART, KTY1JUH, WCN6YNF, BEART, U7FBFWLC     Type & Screen (If Applicable):  No results found for: LABABO, [address]    Anesthesia Evaluation  Patient summary reviewed and Nursing notes reviewed no history of anesthetic complications:   Airway: Mallampati: II     Neck ROM: full   Dental: normal exam         Pulmonary: breath sounds clear to auscultation      (-) pneumonia, COPD, asthma, shortness of breath, recent URI and sleep apnea                           Cardiovascular:  Exercise tolerance: good (>4 METS),   (+) hyperlipidemia    (-) pacemaker, hypertension, valvular problems/murmurs, past MI, CAD, CABG/stent, dysrhythmias,  angina,  CHF, orthopnea, PND and  PARK      Rhythm: regular  Rate: normal           Beta Blocker:  Not on Beta Blocker         Neuro/Psych:   (+) psychiatric history:depression/anxiety    (-) seizures, neuromuscular disease, TIA, CVA and headaches           GI/Hepatic/Renal:   (+) PUD,      (-) hiatal hernia, GERD, hepatitis, liver disease, no renal disease and bowel prep       Endo/Other:        (-) hypothyroidism, hyperthyroidism, blood dyscrasia, arthritis               Abdominal:         (-) obese     Vascular:                                        Anesthesia Plan      MAC     ASA 2       Induction: intravenous. Anesthetic plan and risks discussed with patient. Plan discussed with CRNA.                   Any Flores MD   5/9/2019

## 2019-05-15 ENCOUNTER — HOSPITAL ENCOUNTER (OUTPATIENT)
Dept: PAIN MANAGEMENT | Age: 44
Discharge: HOME | End: 2019-05-15
Payer: MEDICARE

## 2019-05-15 VITALS — SYSTOLIC BLOOD PRESSURE: 109 MMHG | HEART RATE: 93 BPM | TEMPERATURE: 98.4 F | DIASTOLIC BLOOD PRESSURE: 71 MMHG

## 2019-05-15 DIAGNOSIS — Z51.81 ENCOUNTER FOR MEDICATION MONITORING: Chronic | ICD-10-CM

## 2019-05-15 DIAGNOSIS — R52 PAIN DISORDER: ICD-10-CM

## 2019-05-15 DIAGNOSIS — M54.2 NECK PAIN, CHRONIC: Primary | Chronic | ICD-10-CM

## 2019-05-15 DIAGNOSIS — M51.26 DISPLACEMENT OF LUMBAR INTERVERTEBRAL DISC WITHOUT MYELOPATHY: ICD-10-CM

## 2019-05-15 DIAGNOSIS — G89.29 NECK PAIN, CHRONIC: Primary | Chronic | ICD-10-CM

## 2019-05-15 PROCEDURE — 99213 OFFICE O/P EST LOW 20 MIN: CPT | Performed by: NURSE PRACTITIONER

## 2019-05-15 PROCEDURE — 99213 OFFICE O/P EST LOW 20 MIN: CPT

## 2019-05-15 RX ORDER — TRAMADOL HYDROCHLORIDE 50 MG/1
50 TABLET ORAL EVERY 8 HOURS PRN
Qty: 90 TABLET | Refills: 0 | Status: SHIPPED | OUTPATIENT
Start: 2019-05-27 | End: 2019-06-25 | Stop reason: SDUPTHER

## 2019-05-15 RX ORDER — GABAPENTIN 300 MG/1
300 CAPSULE ORAL EVERY 8 HOURS SCHEDULED
Qty: 90 CAPSULE | Refills: 0 | Status: SHIPPED | OUTPATIENT
Start: 2019-05-15 | End: 2019-06-25 | Stop reason: SDUPTHER

## 2019-05-15 ASSESSMENT — ENCOUNTER SYMPTOMS
SHORTNESS OF BREATH: 0
BACK PAIN: 1
CONSTIPATION: 0
COUGH: 0

## 2019-05-15 NOTE — PROGRESS NOTES
Patient is here today to review medication contract and follow up after facet block    Chief Complaint: back pain    PMH: Patient complains of low back pain since [id number] with no known injury. He uses a cane for stability but states this is due to foot/ankle pain not back pain - he did have a fracture in the left ankle the past.  He recevies duramorph injections about every 8-9 months with good relief. His last one was 10/23/17 with ongoing relief. He is also using TENS unit and OTC lidocaine cream with good relief. He has never had back surgery. He is gradually increasing exercise as tolerated and does try to walk 4x week and get 3400 steps a day.  Patient states tramadol is managing his pain very well - he takes it PRN.   Pt had recent 7 day inpt stay for a bipolar episode and is now on new medication and following with [facility] -  see Dr. Ji Baker notes. He had Lumbar Facet Nerve Block Multiple Levels  Bilateral L4 - 5 and L5 - S1 on 5/9/19 and reports 40% relief of his pain. Back Pain   This is a chronic problem. The current episode started more than 1 year ago. The problem occurs constantly. The problem has been gradually improving since onset. The pain is present in the lumbar spine and gluteal. The quality of the pain is described as aching, burning, stabbing and shooting. The pain radiates to the right thigh and left thigh. The pain is at a severity of 2/10. The pain is mild. The pain is the same all the time. The symptoms are aggravated by bending, sitting, standing, position, lying down and twisting. Stiffness is present in the morning. Pertinent negatives include no chest pain or fever. (Has more associated sx when his pain scale is higher. Today it's pretty low.) He has tried analgesics, heat, home exercises, muscle relaxant and walking for the symptoms. The treatment provided moderate relief. Patient denies any new neurological symptoms.  No bowel or bladder incontinence, no weakness, and no falling. Pill count: appropriate - has #30 extra pills - takes PRN    Morphine equivalent: 15    Controlled Substances Monitoring:     RX Monitoring 5/15/2019   Attestation The Prescription Monitoring Report for this patient was reviewed today. Chronic Pain Routine Monitoring Possible medication side effects, risk of tolerance/dependence & alternative treatments discussed. ;Obtaining appropriate analgesic effect of treatment. ;No signs of potential drug abuse or diversion identified: otherwise, see note documentation   Chronic Pain > 80 MEDD -       Past Medical History:   Diagnosis Date    Back pain, chronic     Bipolar 1 disorder ([facility] [address] 8/2/2013    go[facility] Bipolar 2 disorder ([facility] [address]     Bronchitis     Degenerative disc disease     Depression     Displacement of lumbar intervertebral disc without myelopathy 5/7/2013    Fibula fracture LEFT    History of gastric ulcer     History of suicidal tendencies     Hyperlipidemia     Lumbar pain with radiation down both legs     Lumbar stenosis     Non-union of fracture LEFT ANKLE    Osteoarthritis     Pneumonia     Sleep disturbance     Spider bite     on great toe on left    Spondylosis     Tenosynovitis of ankle LEFT       Past Surgical History:   Procedure Laterality Date    ADENOIDECTOMY      HC INJECT OTHER PERPHRL NERV Bilateral 5/9/2019    INJECTION FACET LUMBAR L4-S1 performed by Anthony Monk [facility] at [facility]  8/8/13    Duramorph, Celestone 9mg    NERVE BLOCK  4-24-14    duramorph epidural steroid block, celestone 9mg, morphine 1.25mg    NERVE BLOCK  12/19/2014    DURAMORPH - CELESTONE 9 MG- DURAMORPH  1.5MG    NERVE BLOCK  7/23/15    duramorph 1.5mg celestone 9mg    NERVE BLOCK  11/19/15    [facility]    NERVE BLOCK  3-10-16    duramorph epidural steroid block duramorph 1.5 mg decadron 7.5 mg    NERVE BLOCK  12/12/2016    duramorph caudal epidural,decadron 7.5 mg, duramorph 1.5 mg    NERVE BLOCK  10/23/2017    duramorph, duramorph 1.5, decadron 10mg    NERVE BLOCK Bilateral 05/02/2019    bilat  medial branch nerve block   marcaine 1/4%    OTHER SURGICAL HISTORY Bilateral 05/09/2019    facet lumbar injection L4-S1    TYMPANOSTOMY TUBE PLACEMENT      UPPER GASTROINTESTINAL ENDOSCOPY      x1    URETHRAL STRICTURE DILATATION         Allergies   Allergen Reactions    Celebrex [Celecoxib] Shortness Of Breath     CHEST PAIN    Penicillins Hives and Nausea And Vomiting         Current Outpatient Medications:     traMADol (ULTRAM) 50 MG tablet, Take 1 tablet by mouth every 8 hours as needed for Pain for up to 30 days. , Disp: 90 tablet, Rfl: 0    gabapentin (NEURONTIN) 300 MG capsule, Take 1 capsule by mouth every 8 hours for 30 days. , Disp: 90 capsule, Rfl: 0    risperiDONE (RISPERDAL) 2 MG tablet, Take 2 mg by mouth nightly, Disp: , Rfl:     escitalopram (LEXAPRO) 20 MG tablet, Take 20 mg by mouth daily, Disp: , Rfl:     atorvastatin (LIPITOR) 40 MG tablet, TAKE ONE TABLET BY MOUTH ONE TIME A DAY, Disp: 90 tablet, Rfl: 3    cyclobenzaprine (FLEXERIL) 10 MG tablet, Take 10 mg by mouth 2 times daily as needed for Muscle spasms, Disp: , Rfl:     lidocaine (LMX) 4 % cream, Apply topically as needed for Pain Apply topically as needed. , Disp: , Rfl:     acetaminophen (TYLENOL) 325 MG tablet, Take 650 mg by mouth as needed for Pain., Disp: , Rfl:     docusate sodium (COLACE) 100 MG capsule, Take 1 capsule by mouth daily as needed for Constipation, Disp: 60 capsule, Rfl: 3  No current facility-administered medications for this encounter.      Facility-Administered Medications Ordered in Other Encounters:     sodium chloride flush 0.9 % injection 10 mL, 10 mL, Intravenous, 2 times per day, Andrea Coughlin MD    sodium chloride flush 0.9 % injection 10 mL, 10 mL, Intravenous, PRN, Andrea Coughlin MD    Family History   Problem Relation Age of Onset    Breast Cancer Mother     Liver Cancer Mother  Other Father         brain cysts       Social History     Socioeconomic History    Marital status:      Spouse name: Not on file    Number of children: Not on file    Years of education: Not on file    Highest education level: Not on file   Occupational History    Not on file   Social Needs    Financial resource strain: Not on file    Food insecurity:     Worry: Not on file     Inability: Not on file    Transportation needs:     Medical: Not on file     Non-medical: Not on file   Tobacco Use    Smoking status: Former Smoker     Packs/day: 1.00     Years: 22.00     Pack years: 22.00     Types: Cigarettes    Smokeless tobacco: Former User     Types: Snuff     Quit date: 2/1/2019    Tobacco comment: 1/2 to 1 ppd   Substance and Sexual Activity    Alcohol use: No     Alcohol/week: 0.0 oz    Drug use: No    Sexual activity: Not on file   Lifestyle    Physical activity:     Days per week: Not on file     Minutes per session: Not on file    Stress: Not on file   Relationships    Social connections:     Talks on phone: Not on file     Gets together: Not on file     Attends Cheondoism service: Not on file     Active member of club or organization: Not on file     Attends meetings of clubs or organizations: Not on file     Relationship status: Not on file    Intimate partner violence:     Fear of current or ex partner: Not on file     Emotionally abused: Not on file     Physically abused: Not on file     Forced sexual activity: Not on file   Other Topics Concern    Not on file   Social History Narrative    Not on file       Review of Systems:  Review of Systems   Constitution: Negative for chills and fever. Cardiovascular: Negative for chest pain and irregular heartbeat. Respiratory: Negative for cough and shortness of breath. Musculoskeletal: Positive for back pain. Gastrointestinal: Negative for constipation.    Neurological: Negative for disturbances in coordination and loss of balance. Physical Exam:  /71   Pulse 93   Temp 98.4 °F (36.9 °C) (Oral)     Physical Exam   Constitutional: He is oriented to person, place, and time. HENT:   Head: Normocephalic. Eyes: EOM are normal.   Neck: Normal range of motion. Pulmonary/Chest: Effort normal.   Musculoskeletal: Normal range of motion. Lumbar back: He exhibits tenderness and pain. Neurological: He is alert and oriented to person, place, and time. Skin: Skin is warm and dry. Record/Diagnostics Review:    Last brett 4/2019 and was appropriate     XR Lumbar 2019 -     FINDINGS:  The vertebral body heights are maintained.  The disc spaces are maintained. There is degenerative facet hypertrophy in the lower lumbar spine.  There is  no spondylolisthesis.  The visualized bony pelvis is intact.  The surrounding  soft tissues are unremarkable.        Impression  Degenerative facet hypertrophy without acute abnormality. Assessment:  Problem List Items Addressed This Visit     Neck pain, chronic - Primary (Chronic)    Encounter for medication monitoring (Chronic)    Displacement of lumbar intervertebral disc without myelopathy    Pain disorder         Treatment Plan:  Patient relates current medications are helping the pain. Patient reports taking pain medications as prescribed, denies obtaining medications from different sources and denies use of illegal drugs. Patient denies side effects from medications like nausea, vomiting, constipation or drowsiness. Patient reports current activities of daily living are possible due to medications and would like to continue them. As always, we encourage daily stretching and strengthening exercises, and recommend minimizing use of pain medications unless patient cannot get through daily activities due to pain. Contract requirements met. Continue opioid therapy.  Script written for tramadol  40% relief following facet block  Follow up appointment made for 4 weeks

## 2019-06-25 ENCOUNTER — HOSPITAL ENCOUNTER (OUTPATIENT)
Dept: PAIN MANAGEMENT | Age: 44
Discharge: HOME | End: 2019-06-25
Payer: COMMERCIAL

## 2019-06-25 VITALS
SYSTOLIC BLOOD PRESSURE: 125 MMHG | DIASTOLIC BLOOD PRESSURE: 78 MMHG | HEART RATE: 93 BPM | TEMPERATURE: 93 F | RESPIRATION RATE: 16 BRPM

## 2019-06-25 DIAGNOSIS — Z51.81 ENCOUNTER FOR MEDICATION MONITORING: Primary | Chronic | ICD-10-CM

## 2019-06-25 DIAGNOSIS — G89.29 CHRONIC BILATERAL LOW BACK PAIN WITH BILATERAL SCIATICA: ICD-10-CM

## 2019-06-25 DIAGNOSIS — M54.41 CHRONIC BILATERAL LOW BACK PAIN WITH BILATERAL SCIATICA: ICD-10-CM

## 2019-06-25 DIAGNOSIS — M54.42 CHRONIC BILATERAL LOW BACK PAIN WITH BILATERAL SCIATICA: ICD-10-CM

## 2019-06-25 DIAGNOSIS — M51.26 DISPLACEMENT OF LUMBAR INTERVERTEBRAL DISC WITHOUT MYELOPATHY: ICD-10-CM

## 2019-06-25 PROCEDURE — 99213 OFFICE O/P EST LOW 20 MIN: CPT | Performed by: NURSE PRACTITIONER

## 2019-06-25 PROCEDURE — 99214 OFFICE O/P EST MOD 30 MIN: CPT

## 2019-06-25 RX ORDER — TRAMADOL HYDROCHLORIDE 50 MG/1
50 TABLET ORAL EVERY 8 HOURS PRN
Qty: 90 TABLET | Refills: 0 | Status: SHIPPED | OUTPATIENT
Start: 2019-07-03 | End: 2019-08-02 | Stop reason: SDUPTHER

## 2019-06-25 RX ORDER — GABAPENTIN 300 MG/1
300 CAPSULE ORAL EVERY 8 HOURS SCHEDULED
Qty: 90 CAPSULE | Refills: 0 | Status: SHIPPED | OUTPATIENT
Start: 2019-06-25 | End: 2019-08-02 | Stop reason: SDUPTHER

## 2019-06-25 ASSESSMENT — PAIN - FUNCTIONAL ASSESSMENT: PAIN_FUNCTIONAL_ASSESSMENT: 0-10

## 2019-06-25 ASSESSMENT — ENCOUNTER SYMPTOMS
SHORTNESS OF BREATH: 0
CONSTIPATION: 0
COUGH: 0
BACK PAIN: 1

## 2019-06-25 NOTE — PROGRESS NOTES
Patient is here today to review medication contract. Chief Complaint:  Neck and back pain    [facility]     Patient complains of low back pain since [id number] with no known injury. He uses a cane for stability but states this is due to foot/ankle pain not back pain - he did have a fracture in the left ankle the past.  He recevies duramorph injections about every 8-9 months with good relief. His last one was 10/23/17 with ongoing relief. He is also using TENS unit and OTC lidocaine cream with good relief. He has never had back surgery.  Patient states tramadol is managing his pain very well - he takes it PRN.     HPI:     Back Pain   This is a chronic problem. The current episode started more than 1 year ago. The problem occurs intermittently. The problem is unchanged. The pain is present in the gluteal and lumbar spine. The quality of the pain is described as aching (dull). Radiates to: bilat buttocks. The pain is at a severity of 2/10. The pain is mild. The pain is the same all the time. The symptoms are aggravated by standing and position. Stiffness is present in the morning. Associated symptoms include numbness and tingling. Pertinent negatives include no chest pain or fever. He has tried muscle relaxant, NSAIDs, walking, heat and analgesics (TENS unit) for the symptoms. The treatment provided moderate relief. Neck Pain    This is a chronic problem. The current episode started more than 1 year ago. The problem occurs intermittently. The problem has been unchanged. The pain is present in the midline and left side. The pain is at a severity of 2/10. The pain is mild. The symptoms are aggravated by position. The pain is same all the time. Stiffness is present in the morning. Associated symptoms include numbness and tingling. Pertinent negatives include no chest pain or fever. Associated symptoms comments: Left hand.  He has tried muscle relaxants, heat, NSAIDs, oral narcotics and acetaminophen (TENS unit) for the symptoms. The treatment provided moderate relief. Patient denies any new neurological symptoms. No bowel or bladder incontinence, no weakness, and no falling. Pill count: appropriate with 6 extra days prescription adjusted appropriately    Morphine equivalent: 15    Periodic Controlled Substance Monitoring: Possible medication side effects, risk of tolerance/dependence & alternative treatments discussed., No signs of potential drug abuse or diversion identified. , Assessed functional status., Obtaining appropriate analgesic effect of treatment.  Fernando Reyes, APRN - CNP)      Past Medical History:   Diagnosis Date    Back pain, chronic     Bipolar 1 disorder ([facility] [address] 8/2/2013    goes to [facility] Bipolar 2 disorder ([facility] [address]     Bronchitis     Degenerative disc disease     Depression     Displacement of lumbar intervertebral disc without myelopathy 5/7/2013    Fibula fracture LEFT    History of gastric ulcer     History of suicidal tendencies     Hyperlipidemia     Lumbar pain with radiation down both legs     Lumbar stenosis     Non-union of fracture LEFT ANKLE    Osteoarthritis     Pneumonia     Sleep disturbance     Spider bite     on great toe on left    Spondylosis     Tenosynovitis of ankle LEFT       Past Surgical History:   Procedure Laterality Date    ADENOIDECTOMY      HC INJECT OTHER PERPHRL NERV Bilateral 5/9/2019    INJECTION FACET LUMBAR L4-S1 performed by Boo Casper MD at [facility]  8/8/13    Duramorph, Celestone 9mg    NERVE BLOCK  4-24-14    duramorph epidural steroid block, celestone 9mg, morphine 1.25mg    NERVE BLOCK  12/19/2014    DURAMORPH - CELESTONE 9 MG- DURAMORPH  1.5MG    NERVE BLOCK  7/23/15    duramorph 1.5mg celestone 9mg    NERVE BLOCK  11/19/15    [facility]    NERVE BLOCK  3-10-16    duramorph epidural steroid block duramorph 1.5 mg decadron 7.5 mg    NERVE BLOCK  12/12/2016    duramorph caudal epidural,decadron 7.5 mg, duramorph 1.5 mg    NERVE BLOCK  10/23/2017    duramorph, duramorph 1.5, decadron 10mg    NERVE BLOCK Bilateral 05/02/2019    bilat  medial branch nerve block   marcaine 1/4%    OTHER SURGICAL HISTORY Bilateral 05/09/2019    facet lumbar injection L4-S1    TYMPANOSTOMY TUBE PLACEMENT      UPPER GASTROINTESTINAL ENDOSCOPY      x1    URETHRAL STRICTURE DILATATION         Allergies   Allergen Reactions    Celebrex [Celecoxib] Shortness Of Breath     CHEST PAIN    Penicillins Hives and Nausea And Vomiting         Current Outpatient Medications:     [START ON 7/3/2019] traMADol (ULTRAM) 50 MG tablet, Take 1 tablet by mouth every 8 hours as needed for Pain for up to 30 days. , Disp: 90 tablet, Rfl: 0    gabapentin (NEURONTIN) 300 MG capsule, Take 1 capsule by mouth every 8 hours for 30 days. , Disp: 90 capsule, Rfl: 0    risperiDONE (RISPERDAL) 2 MG tablet, Take 2 mg by mouth nightly, Disp: , Rfl:     escitalopram (LEXAPRO) 20 MG tablet, Take 20 mg by mouth daily, Disp: , Rfl:     atorvastatin (LIPITOR) 40 MG tablet, TAKE ONE TABLET BY MOUTH ONE TIME A DAY, Disp: 90 tablet, Rfl: 3    cyclobenzaprine (FLEXERIL) 10 MG tablet, Take 10 mg by mouth 2 times daily as needed for Muscle spasms, Disp: , Rfl:     docusate sodium (COLACE) 100 MG capsule, Take 1 capsule by mouth daily as needed for Constipation, Disp: 60 capsule, Rfl: 3    lidocaine (LMX) 4 % cream, Apply topically as needed for Pain Apply topically as needed. , Disp: , Rfl:     acetaminophen (TYLENOL) 325 MG tablet, Take 650 mg by mouth as needed for Pain., Disp: , Rfl:   No current facility-administered medications for this encounter.      Facility-Administered Medications Ordered in Other Encounters:     sodium chloride flush 0.9 % injection 10 mL, 10 mL, Intravenous, 2 times per day, Tyler Velazquez MD    sodium chloride flush 0.9 % injection 10 mL, 10 mL, Intravenous, PRN, Tyler Velazquez MD    Family History   Problem Relation Age of Onset  Breast Cancer Mother     Liver Cancer Mother     Other Father         brain cysts       Social History     Socioeconomic History    Marital status:      Spouse name: Not on file    Number of children: Not on file    Years of education: Not on file    Highest education level: Not on file   Occupational History    Not on file   Social Needs    Financial resource strain: Not on file    Food insecurity:     Worry: Not on file     Inability: Not on file    Transportation needs:     Medical: Not on file     Non-medical: Not on file   Tobacco Use    Smoking status: Former Smoker     Packs/day: 1.00     Years: 22.00     Pack years: 22.00     Types: Cigarettes    Smokeless tobacco: Former User     Types: Snuff     Quit date: 2/1/2019    Tobacco comment: 1/2 to 1 ppd   Substance and Sexual Activity    Alcohol use: No     Alcohol/week: 0.0 oz    Drug use: No    Sexual activity: Not on file   Lifestyle    Physical activity:     Days per week: Not on file     Minutes per session: Not on file    Stress: Not on file   Relationships    Social connections:     Talks on phone: Not on file     Gets together: Not on file     Attends Yarsanism service: Not on file     Active member of club or organization: Not on file     Attends meetings of clubs or organizations: Not on file     Relationship status: Not on file    Intimate partner violence:     Fear of current or ex partner: Not on file     Emotionally abused: Not on file     Physically abused: Not on file     Forced sexual activity: Not on file   Other Topics Concern    Not on file   Social History Narrative    Not on file       Review of Systems:  Review of Systems   Constitution: Negative for chills and fever. Cardiovascular: Negative for chest pain. Respiratory: Negative for cough and shortness of breath. Musculoskeletal: Positive for back pain and neck pain. Gastrointestinal: Negative for constipation.    Neurological: Positive for numbness and tingling. Physical Exam:  /78   Pulse 93   Temp 93 °F (33.9 °C)   Resp 16     Physical Exam   Constitutional: He is oriented to person, place, and time. Cardiovascular: Normal rate. Pulmonary/Chest: Effort normal.   Musculoskeletal: Normal range of motion. Antalgic gait using cane   Neurological: He is alert and oriented to person, place, and time. Skin: Skin is warm and dry.        Record/Diagnostics Review:    Last brett April and was appropriate     MRI Lumbar 2017 -      FINDINGS:  BONES/ALIGNMENT: The vertebral body heights appear maintained.  There is straightening of the normal lumbar lordosis.  No evidence of spondylolisthesis.  Minimal disc desiccation at L4-L5 and L5-S1.  The bone marrow signal demonstrates no acute abnormality.     SPINAL CORD: The conus terminates normally.     SOFT TISSUES: No paraspinal mass identified.     L1-L2: There is a disc bulge flattening the ventral thecal sac without significant spinal canal stenosis.  No neural foraminal narrowing.     L2-L3: There is a disc bulge indenting on the ventral thecal sac with minimal bilateral facet arthrosis contributing to mild spinal canal stenosis.  No neural foraminal narrowing.  This has progressed from the prior exam.     L3-L4: There is a disc bulge flattening the ventral thecal sac without significant spinal canal stenosis.  No neural foraminal narrowing.  This is minimally progressed from the prior exam.     L4-L5: There is a disc bulge flattening the ventral thecal sac without significant spinal canal stenosis.  Minimal bilateral facet arthrosis contributes to mild bilateral neural foraminal narrowing.  Findings appear similar to the prior exam.     L5-S1: There is a disc bulge with a superimposed left paracentral disc protrusion, which appears to contact the left S1 nerve root.  No evidence of impingement.  No significant spinal canal stenosis.  Bilateral facet arthrosis contributes to mild right and moderate left neural foraminal narrowing.  This is similar to the prior exam.      Assessment:  Problem List Items Addressed This Visit     Encounter for medication monitoring - Primary (Chronic)    Displacement of lumbar intervertebral disc without myelopathy    Relevant Medications    traMADol (ULTRAM) 50 MG tablet (Start on 7/3/2019)    Chronic back pain    Relevant Medications    traMADol (ULTRAM) 50 MG tablet (Start on 7/3/2019)    gabapentin (NEURONTIN) 300 MG capsule             Treatment Plan:  Patient relates current medications are helping the pain. Patient reports taking pain medications as prescribed, denies obtaining medications from different sources and denies use of illegal drugs. Patient denies side effects from medications like nausea, vomiting, constipation or drowsiness. Patient reports current activities of daily living are possible due to medications and would like to continue them. As always, we encourage daily stretching and strengthening exercises, and recommend minimizing use of pain medications unless patient cannot get through daily activities due to pain. Contract requirements met. Continue opioid therapy.  Script written for tramadol  gabapentin  Follow up appointment made for 4 weeks at [facility] office

## 2019-08-02 ENCOUNTER — OFFICE VISIT (OUTPATIENT)
Dept: PAIN MANAGEMENT | Age: 44
End: 2019-08-02
Payer: COMMERCIAL

## 2019-08-02 VITALS
WEIGHT: 223.2 LBS | DIASTOLIC BLOOD PRESSURE: 70 MMHG | BODY MASS INDEX: 31.95 KG/M2 | HEIGHT: 70 IN | OXYGEN SATURATION: 98 % | SYSTOLIC BLOOD PRESSURE: 120 MMHG | HEART RATE: 103 BPM

## 2019-08-02 DIAGNOSIS — M54.41 CHRONIC BILATERAL LOW BACK PAIN WITH BILATERAL SCIATICA: ICD-10-CM

## 2019-08-02 DIAGNOSIS — M54.42 CHRONIC BILATERAL LOW BACK PAIN WITH BILATERAL SCIATICA: ICD-10-CM

## 2019-08-02 DIAGNOSIS — Z51.81 ENCOUNTER FOR MEDICATION MONITORING: Chronic | ICD-10-CM

## 2019-08-02 DIAGNOSIS — M51.26 DISPLACEMENT OF LUMBAR INTERVERTEBRAL DISC WITHOUT MYELOPATHY: Primary | ICD-10-CM

## 2019-08-02 DIAGNOSIS — G89.29 CHRONIC BILATERAL LOW BACK PAIN WITH BILATERAL SCIATICA: ICD-10-CM

## 2019-08-02 PROCEDURE — 99213 OFFICE O/P EST LOW 20 MIN: CPT | Performed by: NURSE PRACTITIONER

## 2019-08-02 RX ORDER — GABAPENTIN 300 MG/1
300 CAPSULE ORAL EVERY 8 HOURS SCHEDULED
Qty: 90 CAPSULE | Refills: 0 | Status: SHIPPED | OUTPATIENT
Start: 2019-08-02 | End: 2019-08-27 | Stop reason: SDUPTHER

## 2019-08-02 RX ORDER — TRAMADOL HYDROCHLORIDE 50 MG/1
50 TABLET ORAL EVERY 8 HOURS PRN
Qty: 90 TABLET | Refills: 0 | Status: SHIPPED | OUTPATIENT
Start: 2019-08-02 | End: 2019-08-27 | Stop reason: SDUPTHER

## 2019-08-02 ASSESSMENT — ENCOUNTER SYMPTOMS
BACK PAIN: 1
RESPIRATORY NEGATIVE: 1
CONSTIPATION: 1

## 2019-08-02 NOTE — PROGRESS NOTES
Social History, Allergies and Medications reviewed and updated in EPIC as indicated    Family History reviewed and is noncontributory. Past Medical History, Past Surgical History, Social History, Allergies and Medications reviewed and updated in EPIC as indicated    Family History reviewed and is noncontributory. Periodic Controlled Substance Monitoring: Possible medication side effects, risk of tolerance/dependence & alternative treatments discussed., No signs of potential drug abuse or diversion identified. , Assessed functional status., Obtaining appropriate analgesic effect of treatment.  Kelly Peraza, APRN - CNP)      Past Medical History:   Diagnosis Date    Back pain, chronic     Bipolar 1 disorder ([facility] [address] 8/2/2013    goes to [facility] Bipolar 2 disorder ([facility] [address]     Bronchitis     Degenerative disc disease     Depression     Displacement of lumbar intervertebral disc without myelopathy 5/7/2013    Fibula fracture LEFT    History of gastric ulcer     History of suicidal tendencies     Hyperlipidemia     Lumbar pain with radiation down both legs     Lumbar stenosis     Non-union of fracture LEFT ANKLE    Osteoarthritis     Pneumonia     Sleep disturbance     Spider bite     on great toe on left    Spondylosis     Tenosynovitis of ankle LEFT       Past Surgical History:   Procedure Laterality Date    ADENOIDECTOMY      HC INJECT OTHER PERPHRL NERV Bilateral 5/9/2019    INJECTION FACET LUMBAR L4-S1 performed by Sherif Gotti MD at [facility]  8/8/13    Duramorph, Celestone 9mg    NERVE BLOCK  4-24-14    duramorph epidural steroid block, celestone 9mg, morphine 1.25mg    NERVE BLOCK  12/19/2014    DURAMORPH - CELESTONE 9 MG- DURAMORPH  1.5MG    NERVE BLOCK  7/23/15    duramorph 1.5mg celestone 9mg    NERVE BLOCK  11/19/15    [facility]    NERVE BLOCK  3-10-16    duramorph epidural steroid block duramorph 1.5 mg decadron 7.5 mg    NERVE BLOCK

## 2019-08-15 ENCOUNTER — TELEPHONE (OUTPATIENT)
Dept: PAIN MANAGEMENT | Age: 44
End: 2019-08-15

## 2019-08-27 ENCOUNTER — OFFICE VISIT (OUTPATIENT)
Dept: PAIN MANAGEMENT | Age: 44
End: 2019-08-27
Payer: COMMERCIAL

## 2019-08-27 VITALS
SYSTOLIC BLOOD PRESSURE: 128 MMHG | WEIGHT: 226 LBS | HEART RATE: 107 BPM | HEIGHT: 70 IN | BODY MASS INDEX: 32.35 KG/M2 | OXYGEN SATURATION: 96 % | DIASTOLIC BLOOD PRESSURE: 77 MMHG

## 2019-08-27 DIAGNOSIS — M51.26 DISPLACEMENT OF LUMBAR INTERVERTEBRAL DISC WITHOUT MYELOPATHY: ICD-10-CM

## 2019-08-27 DIAGNOSIS — M54.41 CHRONIC BILATERAL LOW BACK PAIN WITH BILATERAL SCIATICA: ICD-10-CM

## 2019-08-27 DIAGNOSIS — G89.29 CHRONIC BILATERAL LOW BACK PAIN WITH BILATERAL SCIATICA: ICD-10-CM

## 2019-08-27 DIAGNOSIS — M54.42 CHRONIC BILATERAL LOW BACK PAIN WITH BILATERAL SCIATICA: ICD-10-CM

## 2019-08-27 DIAGNOSIS — Z86.59 HISTORY OF PSYCHIATRIC DISORDER: ICD-10-CM

## 2019-08-27 DIAGNOSIS — M47.817 LUMBOSACRAL SPONDYLOSIS WITHOUT MYELOPATHY: Primary | ICD-10-CM

## 2019-08-27 PROCEDURE — 99214 OFFICE O/P EST MOD 30 MIN: CPT | Performed by: ANESTHESIOLOGY

## 2019-08-27 RX ORDER — TRAMADOL HYDROCHLORIDE 50 MG/1
50 TABLET ORAL EVERY 8 HOURS PRN
Qty: 90 TABLET | Refills: 0 | Status: SHIPPED | OUTPATIENT
Start: 2019-09-01 | End: 2019-09-30 | Stop reason: SDUPTHER

## 2019-08-27 RX ORDER — GABAPENTIN 300 MG/1
300 CAPSULE ORAL EVERY 8 HOURS SCHEDULED
Qty: 90 CAPSULE | Refills: 0 | Status: SHIPPED | OUTPATIENT
Start: 2019-08-27 | End: 2019-09-30 | Stop reason: SDUPTHER

## 2019-08-27 ASSESSMENT — ENCOUNTER SYMPTOMS
RESPIRATORY NEGATIVE: 1
BACK PAIN: 1

## 2019-08-27 NOTE — PROGRESS NOTES
 Degenerative disc disease     Depression     Displacement of lumbar intervertebral disc without myelopathy 5/7/2013    Fibula fracture LEFT    History of gastric ulcer     History of suicidal tendencies     Hyperlipidemia     Lumbar pain with radiation down both legs     Lumbar stenosis     Non-union of fracture LEFT ANKLE    Osteoarthritis     Pneumonia     Sleep disturbance     Spider bite     on great toe on left    Spondylosis     Tenosynovitis of ankle LEFT      Past Surgical History:   Procedure Laterality Date    ADENOIDECTOMY      HC INJECT OTHER PERPHRL NERV Bilateral 5/9/2019    INJECTION FACET LUMBAR L4-S1 performed by Merlinda Crocker, MD at [facility]  8/8/13    Duramorph, Celestone 9mg    NERVE BLOCK  4-24-14    duramorph epidural steroid block, celestone 9mg, morphine 1.25mg    NERVE BLOCK  12/19/2014    DURAMORPH - CELESTONE 9 MG- DURAMORPH  1.5MG    NERVE BLOCK  7/23/15    duramorph 1.5mg celestone 9mg    NERVE BLOCK  11/19/15    Tens-Empi Select    NERVE BLOCK  3-10-16    duramorph epidural steroid block duramorph 1.5 mg decadron 7.5 mg    NERVE BLOCK  12/12/2016    duramorph caudal epidural,decadron 7.5 mg, duramorph 1.5 mg    NERVE BLOCK  10/23/2017    duramorph, duramorph 1.5, decadron 10mg    NERVE BLOCK Bilateral 05/02/2019    bilat  medial branch nerve block   marcaine 1/4%    OTHER SURGICAL HISTORY Bilateral 05/09/2019    facet lumbar injection L4-S1    TYMPANOSTOMY TUBE PLACEMENT      UPPER GASTROINTESTINAL ENDOSCOPY      x1    URETHRAL STRICTURE DILATATION       Social History     Socioeconomic History    Marital status:      Spouse name: None    Number of children: None    Years of education: None    Highest education level: None   Occupational History    None   Social Needs    Financial resource strain: None    Food insecurity:     Worry: None     Inability: None    Transportation needs:     Medical: None tablet Take 10 mg by mouth 2 times daily as needed for Muscle spasms      docusate sodium (COLACE) 100 MG capsule Take 1 capsule by mouth daily as needed for Constipation 60 capsule 3    lidocaine (LMX) 4 % cream Apply topically as needed for Pain Apply topically as needed.  acetaminophen (TYLENOL) 325 MG tablet Take 650 mg by mouth as needed for Pain. No current facility-administered medications for this visit. Facility-Administered Medications Ordered in Other Visits   Medication Dose Route Frequency Provider Last Rate Last Dose    sodium chloride flush 0.9 % injection 10 mL  10 mL Intravenous 2 times per day Cm Layton MD        sodium chloride flush 0.9 % injection 10 mL  10 mL Intravenous PRN Cm Layton MD         Review of Systems   Constitutional: Negative. Negative for fever. Respiratory: Negative. Musculoskeletal: Positive for arthralgias, back pain, joint swelling, neck pain and neck stiffness. Negative for gait problem and myalgias. Neurological: Negative. Objective:  General Appearance:  Well-appearing and in no acute distress. Vital signs: (most recent): Blood pressure 128/77, pulse 107, height 5' 10\" (1.778 m), weight 226 lb (102.5 kg), SpO2 96 %. Vital signs are normal.  No fever. HEENT: Normal HEENT exam.    Lungs:  Normal effort. Breath sounds clear to auscultation. Heart: Normal rate. Abdomen: Abdomen is soft. Extremities: Normal range of motion. Neurological: Patient is alert and oriented to person, place and time. Normal strength. Patient has normal reflexes, normal muscle tone and normal coordination. Pupils:  Pupils are equal, round, and reactive to light. Skin:  Warm, dry and pale. No rash, ecchymosis, cyanosis or ulceration.       Lumbar spine examination  No apparent deformity on inspection  Active range of motion is limited and associated with pain  Gait is stable  Positive tenderness to palpation over bilateral

## 2019-09-30 ENCOUNTER — OFFICE VISIT (OUTPATIENT)
Dept: PAIN MANAGEMENT | Age: 44
End: 2019-09-30
Payer: COMMERCIAL

## 2019-09-30 VITALS
DIASTOLIC BLOOD PRESSURE: 74 MMHG | WEIGHT: 229 LBS | HEART RATE: 90 BPM | SYSTOLIC BLOOD PRESSURE: 118 MMHG | OXYGEN SATURATION: 98 % | HEIGHT: 70 IN | BODY MASS INDEX: 32.78 KG/M2

## 2019-09-30 DIAGNOSIS — Z51.81 ENCOUNTER FOR MEDICATION MONITORING: Chronic | ICD-10-CM

## 2019-09-30 DIAGNOSIS — M47.817 LUMBOSACRAL SPONDYLOSIS WITHOUT MYELOPATHY: Primary | ICD-10-CM

## 2019-09-30 DIAGNOSIS — M54.41 CHRONIC BILATERAL LOW BACK PAIN WITH BILATERAL SCIATICA: ICD-10-CM

## 2019-09-30 DIAGNOSIS — M51.26 DISPLACEMENT OF LUMBAR INTERVERTEBRAL DISC WITHOUT MYELOPATHY: ICD-10-CM

## 2019-09-30 DIAGNOSIS — G89.29 CHRONIC BILATERAL LOW BACK PAIN WITH BILATERAL SCIATICA: ICD-10-CM

## 2019-09-30 DIAGNOSIS — M54.42 CHRONIC BILATERAL LOW BACK PAIN WITH BILATERAL SCIATICA: ICD-10-CM

## 2019-09-30 PROCEDURE — 99213 OFFICE O/P EST LOW 20 MIN: CPT | Performed by: NURSE PRACTITIONER

## 2019-09-30 RX ORDER — GABAPENTIN 300 MG/1
300 CAPSULE ORAL EVERY 8 HOURS SCHEDULED
Qty: 90 CAPSULE | Refills: 2 | Status: SHIPPED | OUTPATIENT
Start: 2019-09-30 | End: 2019-12-26 | Stop reason: SDUPTHER

## 2019-09-30 RX ORDER — CYCLOBENZAPRINE HCL 10 MG
10 TABLET ORAL 2 TIMES DAILY PRN
Qty: 60 TABLET | Refills: 3 | Status: SHIPPED | OUTPATIENT
Start: 2019-09-30 | End: 2019-10-30

## 2019-09-30 RX ORDER — TRAMADOL HYDROCHLORIDE 50 MG/1
50 TABLET ORAL EVERY 8 HOURS PRN
Qty: 90 TABLET | Refills: 0 | Status: SHIPPED | OUTPATIENT
Start: 2019-10-05 | End: 2019-10-22 | Stop reason: SDUPTHER

## 2019-09-30 ASSESSMENT — ENCOUNTER SYMPTOMS
NAUSEA: 0
VOMITING: 1
EYES NEGATIVE: 1
BACK PAIN: 1
CONSTIPATION: 1
RESPIRATORY NEGATIVE: 1

## 2019-09-30 NOTE — PROGRESS NOTES
Patient is here today to review medication contract. Chief Complaint   Patient presents with    Medication Refill    Back Pain     [facility]     Patient complains of low back pain since [id number] with no known injury. He uses a cane for stability but states this is due to foot/ankle pain not back pain - he did have a fracture in the left ankle the past.  Had lumbar facet block at [facility] x2  Patient reports significant improvement 60% plus for short-term after the facet block. In past he had lumbar steroid injection but steroid were discontinued because of psychiatric issue. RFAs were recently scheduled but cancelled by facility - no longer being done on Thursdays. Pt to reschedule for a monday . He is also using TENS unit and OTC lidocaine cream with good relief. . Pete Fairly states tramadol is managing his pain very well - he takes it PRN.     HPI:  Back Pain   This is a chronic problem. The current episode started more than 1 year ago. The problem occurs constantly. The problem is unchanged. The pain is present in the lumbar spine. The quality of the pain is described as aching. The pain does not radiate. The pain is at a severity of 2/10. The pain is worse during the day. The symptoms are aggravated by standing and position. Pertinent negatives include no chest pain, fever, numbness or paresthesias. Risk factors include lack of exercise and obesity. He has tried analgesics for the symptoms. The treatment provided mild relief. Medication Refill: tramadol    Pain score Today:  2  Adverse effects (Constipation / Nausea / Sedation / sexual Dysfunction / others) : constipation  Mood: good  Sleep pattern and quality: fair  Activity level: fair    Pill count Today: 16 Due today prescription adjusted appropriately 10/5 was in hospital for psych medication adjustment.  F/u with unason  Last dose taken  5 am 9/30/19  OARRS report reviewed today: yes  ER/Hospitalizations/PCP visit related to pain since last visit:yes   Any legal problems e.g. DUI etc.:No  Satisfied with current management: No    Opioid Contract: 8/5/19   Last Urine Dug screen dated: 4/12/2019    No results found for: LABA1C  No results found for: EAG    Past Medical History, Past Surgical History, Social History, Allergies and Medications reviewed and updated in EPIC as indicated    Family History reviewed and is noncontributory. Periodic Controlled Substance Monitoring: Possible medication side effects, risk of tolerance/dependence & alternative treatments discussed., No signs of potential drug abuse or diversion identified. , Assessed functional status., Obtaining appropriate analgesic effect of treatment.  Vladislav Dhillon, APRN - CNP)      Past Medical History:   Diagnosis Date    Back pain, chronic     Bipolar 1 disorder ([facility] [address] 8/2/2013    goes to [facility] Bipolar 2 disorder ([facility] [address]     Bronchitis     Degenerative disc disease     Depression     Displacement of lumbar intervertebral disc without myelopathy 5/7/2013    Fibula fracture LEFT    History of gastric ulcer     History of suicidal tendencies     Hyperlipidemia     Lumbar pain with radiation down both legs     Lumbar stenosis     Non-union of fracture LEFT ANKLE    Osteoarthritis     Pneumonia     Sleep disturbance     Spider bite     on great toe on left    Spondylosis     Tenosynovitis of ankle LEFT       Past Surgical History:   Procedure Laterality Date    ADENOIDECTOMY      HC INJECT OTHER PERPHRL NERV Bilateral 5/9/2019    INJECTION FACET LUMBAR L4-S1 performed by Jay Busch MD at [facility]  8/8/13    Duramorph, Celestone 9mg    NERVE BLOCK  4-24-14    duramorph epidural steroid block, celestone 9mg, morphine 1.25mg    NERVE BLOCK  12/19/2014    DURAMORPH - CELESTONE 9 MG- DURAMORPH  1.5MG    NERVE BLOCK  7/23/15    duramorph 1.5mg celestone 9mg    NERVE BLOCK  11/19/15    Tens-Empi Select    NERVE BLOCK  3-10-16    duramorph Ht 5' 10\" (1.778 m)   Wt 229 lb (103.9 kg)   SpO2 98%   BMI 32.86 kg/m²     Physical Exam   Constitutional: He is oriented to person, place, and time. Cardiovascular: Normal rate. Pulmonary/Chest: Effort normal.   Musculoskeletal: Normal range of motion. Antalgic gait using cane   Neurological: He is alert and oriented to person, place, and time. Skin: Skin is warm and dry. Assessment:  Problem List Items Addressed This Visit     Encounter for medication monitoring (Chronic)    Displacement of lumbar intervertebral disc without myelopathy    Relevant Medications    traMADol (ULTRAM) 50 MG tablet (Start on 10/5/2019)    gabapentin (NEURONTIN) 300 MG capsule    Chronic back pain    Relevant Medications    sertraline (ZOLOFT) 50 MG tablet    traMADol (ULTRAM) 50 MG tablet (Start on 10/5/2019)    gabapentin (NEURONTIN) 300 MG capsule    cyclobenzaprine (FLEXERIL) 10 MG tablet    Lumbosacral spondylosis without myelopathy - Primary    Relevant Medications    traMADol (ULTRAM) 50 MG tablet (Start on 10/5/2019)    gabapentin (NEURONTIN) 300 MG capsule    cyclobenzaprine (FLEXERIL) 10 MG tablet             Treatment Plan:  Patient relates current medications are helping the pain. Patient reports taking pain medications as prescribed, denies obtaining medications from different sources and denies use of illegal drugs. Patient denies side effects from medications like nausea, vomiting, constipation or drowsiness. Patient reports current activities of daily living are possible due to medications and would like to continue them. As always, we encourage daily stretching and strengthening exercises, and recommend minimizing use of pain medications unless patient cannot get through daily activities due to pain. Contract requirements met. Continue opioid therapy.  Script written for tramadol flexeril and gabapentin  RFA was scheduled for  A Thurs and pt to schedule for a Monday when he can get a

## 2019-10-21 ENCOUNTER — HOSPITAL ENCOUNTER (OUTPATIENT)
Age: 44
Setting detail: OUTPATIENT SURGERY
Discharge: HOME | End: 2019-10-21
Attending: ANESTHESIOLOGY | Admitting: ANESTHESIOLOGY
Payer: COMMERCIAL

## 2019-10-21 ENCOUNTER — APPOINTMENT (OUTPATIENT)
Dept: GENERAL RADIOLOGY | Age: 44
End: 2019-10-21
Attending: ANESTHESIOLOGY
Payer: COMMERCIAL

## 2019-10-21 VITALS
RESPIRATION RATE: 16 BRPM | SYSTOLIC BLOOD PRESSURE: 137 MMHG | TEMPERATURE: 97.2 F | DIASTOLIC BLOOD PRESSURE: 81 MMHG | BODY MASS INDEX: 33.87 KG/M2 | OXYGEN SATURATION: 97 % | HEIGHT: 70 IN | WEIGHT: 236.55 LBS | HEART RATE: 84 BPM

## 2019-10-21 PROBLEM — M47.817 LUMBOSACRAL SPONDYLOSIS WITHOUT MYELOPATHY: Chronic | Status: ACTIVE | Noted: 2019-08-27

## 2019-10-21 PROCEDURE — 7100000011 HC PHASE II RECOVERY - ADDTL 15 MIN: Performed by: ANESTHESIOLOGY

## 2019-10-21 PROCEDURE — 3209999900 FLUORO FOR SURGICAL PROCEDURES

## 2019-10-21 PROCEDURE — 99152 MOD SED SAME PHYS/QHP 5/>YRS: CPT | Performed by: ANESTHESIOLOGY

## 2019-10-21 PROCEDURE — 2709999900 HC NON-CHARGEABLE SUPPLY: Performed by: ANESTHESIOLOGY

## 2019-10-21 PROCEDURE — 6360000002 HC RX W HCPCS: Performed by: ANESTHESIOLOGY

## 2019-10-21 PROCEDURE — 64635 DESTROY LUMB/SAC FACET JNT: CPT | Performed by: ANESTHESIOLOGY

## 2019-10-21 PROCEDURE — 64636 DESTROY L/S FACET JNT ADDL: CPT | Performed by: ANESTHESIOLOGY

## 2019-10-21 PROCEDURE — 3600000055 HC PAIN LEVEL 3 ADDL 15 MIN: Performed by: ANESTHESIOLOGY

## 2019-10-21 PROCEDURE — 7100000010 HC PHASE II RECOVERY - FIRST 15 MIN: Performed by: ANESTHESIOLOGY

## 2019-10-21 PROCEDURE — 99153 MOD SED SAME PHYS/QHP EA: CPT | Performed by: ANESTHESIOLOGY

## 2019-10-21 PROCEDURE — 2500000003 HC RX 250 WO HCPCS: Performed by: ANESTHESIOLOGY

## 2019-10-21 PROCEDURE — 3600000054 HC PAIN LEVEL 3 BASE: Performed by: ANESTHESIOLOGY

## 2019-10-21 RX ORDER — FENTANYL CITRATE 50 UG/ML
INJECTION, SOLUTION INTRAMUSCULAR; INTRAVENOUS PRN
Status: DISCONTINUED | OUTPATIENT
Start: 2019-10-21 | End: 2019-10-21 | Stop reason: ALTCHOICE

## 2019-10-21 RX ORDER — MIDAZOLAM HYDROCHLORIDE 1 MG/ML
INJECTION INTRAMUSCULAR; INTRAVENOUS PRN
Status: DISCONTINUED | OUTPATIENT
Start: 2019-10-21 | End: 2019-10-21 | Stop reason: ALTCHOICE

## 2019-10-21 RX ORDER — SODIUM CHLORIDE 0.9 % (FLUSH) 0.9 %
10 SYRINGE (ML) INJECTION EVERY 12 HOURS SCHEDULED
Status: DISCONTINUED | OUTPATIENT
Start: 2019-10-21 | End: 2019-10-21 | Stop reason: HOSPADM

## 2019-10-21 RX ORDER — LIDOCAINE HYDROCHLORIDE 40 MG/ML
INJECTION, SOLUTION RETROBULBAR; TOPICAL PRN
Status: DISCONTINUED | OUTPATIENT
Start: 2019-10-21 | End: 2019-10-21 | Stop reason: ALTCHOICE

## 2019-10-21 RX ORDER — SODIUM CHLORIDE 0.9 % (FLUSH) 0.9 %
10 SYRINGE (ML) INJECTION PRN
Status: DISCONTINUED | OUTPATIENT
Start: 2019-10-21 | End: 2019-10-21 | Stop reason: HOSPADM

## 2019-10-21 RX ORDER — LIDOCAINE HYDROCHLORIDE 10 MG/ML
INJECTION, SOLUTION INFILTRATION; PERINEURAL PRN
Status: DISCONTINUED | OUTPATIENT
Start: 2019-10-21 | End: 2019-10-21 | Stop reason: ALTCHOICE

## 2019-10-21 ASSESSMENT — PAIN SCALES - GENERAL
PAINLEVEL_OUTOF10: 0
PAINLEVEL_OUTOF10: 2

## 2019-10-21 ASSESSMENT — PAIN - FUNCTIONAL ASSESSMENT: PAIN_FUNCTIONAL_ASSESSMENT: 0-10

## 2019-10-22 ENCOUNTER — TELEPHONE (OUTPATIENT)
Dept: PAIN MANAGEMENT | Age: 44
End: 2019-10-22

## 2019-10-22 DIAGNOSIS — M47.817 LUMBOSACRAL SPONDYLOSIS WITHOUT MYELOPATHY: ICD-10-CM

## 2019-10-22 DIAGNOSIS — G89.29 CHRONIC BILATERAL LOW BACK PAIN WITH BILATERAL SCIATICA: ICD-10-CM

## 2019-10-22 DIAGNOSIS — M54.42 CHRONIC BILATERAL LOW BACK PAIN WITH BILATERAL SCIATICA: ICD-10-CM

## 2019-10-22 DIAGNOSIS — M54.41 CHRONIC BILATERAL LOW BACK PAIN WITH BILATERAL SCIATICA: ICD-10-CM

## 2019-10-22 DIAGNOSIS — M51.26 DISPLACEMENT OF LUMBAR INTERVERTEBRAL DISC WITHOUT MYELOPATHY: ICD-10-CM

## 2019-10-22 RX ORDER — TRAMADOL HYDROCHLORIDE 50 MG/1
50 TABLET ORAL EVERY 8 HOURS PRN
Qty: 90 TABLET | Refills: 0 | Status: SHIPPED | OUTPATIENT
Start: 2019-11-02 | End: 2019-11-25 | Stop reason: SDUPTHER

## 2019-11-11 ENCOUNTER — HOSPITAL ENCOUNTER (OUTPATIENT)
Age: 44
Setting detail: OUTPATIENT SURGERY
Discharge: HOME | End: 2019-11-11
Attending: ANESTHESIOLOGY | Admitting: ANESTHESIOLOGY
Payer: COMMERCIAL

## 2019-11-11 ENCOUNTER — APPOINTMENT (OUTPATIENT)
Dept: GENERAL RADIOLOGY | Age: 44
End: 2019-11-11
Attending: ANESTHESIOLOGY
Payer: COMMERCIAL

## 2019-11-11 VITALS
DIASTOLIC BLOOD PRESSURE: 79 MMHG | RESPIRATION RATE: 18 BRPM | SYSTOLIC BLOOD PRESSURE: 129 MMHG | HEART RATE: 79 BPM | HEIGHT: 70 IN | OXYGEN SATURATION: 97 % | WEIGHT: 235 LBS | TEMPERATURE: 97.5 F | BODY MASS INDEX: 33.64 KG/M2

## 2019-11-11 PROCEDURE — 99153 MOD SED SAME PHYS/QHP EA: CPT | Performed by: ANESTHESIOLOGY

## 2019-11-11 PROCEDURE — 6360000002 HC RX W HCPCS: Performed by: ANESTHESIOLOGY

## 2019-11-11 PROCEDURE — 99152 MOD SED SAME PHYS/QHP 5/>YRS: CPT | Performed by: ANESTHESIOLOGY

## 2019-11-11 PROCEDURE — 2709999900 HC NON-CHARGEABLE SUPPLY: Performed by: ANESTHESIOLOGY

## 2019-11-11 PROCEDURE — 3600000054 HC PAIN LEVEL 3 BASE: Performed by: ANESTHESIOLOGY

## 2019-11-11 PROCEDURE — 64635 DESTROY LUMB/SAC FACET JNT: CPT | Performed by: ANESTHESIOLOGY

## 2019-11-11 PROCEDURE — 3209999900 FLUORO FOR SURGICAL PROCEDURES

## 2019-11-11 PROCEDURE — 7100000010 HC PHASE II RECOVERY - FIRST 15 MIN: Performed by: ANESTHESIOLOGY

## 2019-11-11 PROCEDURE — 2580000003 HC RX 258: Performed by: ANESTHESIOLOGY

## 2019-11-11 PROCEDURE — 64636 DESTROY L/S FACET JNT ADDL: CPT | Performed by: ANESTHESIOLOGY

## 2019-11-11 PROCEDURE — 7100000011 HC PHASE II RECOVERY - ADDTL 15 MIN: Performed by: ANESTHESIOLOGY

## 2019-11-11 PROCEDURE — 2500000003 HC RX 250 WO HCPCS: Performed by: ANESTHESIOLOGY

## 2019-11-11 PROCEDURE — 3600000055 HC PAIN LEVEL 3 ADDL 15 MIN: Performed by: ANESTHESIOLOGY

## 2019-11-11 RX ORDER — FENTANYL CITRATE 50 UG/ML
INJECTION, SOLUTION INTRAMUSCULAR; INTRAVENOUS PRN
Status: DISCONTINUED | OUTPATIENT
Start: 2019-11-11 | End: 2019-11-11 | Stop reason: ALTCHOICE

## 2019-11-11 RX ORDER — LIDOCAINE HYDROCHLORIDE 40 MG/ML
INJECTION, SOLUTION RETROBULBAR; TOPICAL PRN
Status: DISCONTINUED | OUTPATIENT
Start: 2019-11-11 | End: 2019-11-11 | Stop reason: ALTCHOICE

## 2019-11-11 RX ORDER — SODIUM CHLORIDE 0.9 % (FLUSH) 0.9 %
10 SYRINGE (ML) INJECTION PRN
Status: DISCONTINUED | OUTPATIENT
Start: 2019-11-11 | End: 2019-11-11 | Stop reason: HOSPADM

## 2019-11-11 RX ORDER — MIDAZOLAM HYDROCHLORIDE 1 MG/ML
INJECTION INTRAMUSCULAR; INTRAVENOUS PRN
Status: DISCONTINUED | OUTPATIENT
Start: 2019-11-11 | End: 2019-11-11 | Stop reason: ALTCHOICE

## 2019-11-11 RX ORDER — SODIUM CHLORIDE 0.9 % (FLUSH) 0.9 %
10 SYRINGE (ML) INJECTION EVERY 12 HOURS SCHEDULED
Status: DISCONTINUED | OUTPATIENT
Start: 2019-11-11 | End: 2019-11-11 | Stop reason: HOSPADM

## 2019-11-11 RX ORDER — LIDOCAINE HYDROCHLORIDE 10 MG/ML
INJECTION, SOLUTION INFILTRATION; PERINEURAL PRN
Status: DISCONTINUED | OUTPATIENT
Start: 2019-11-11 | End: 2019-11-11 | Stop reason: ALTCHOICE

## 2019-11-11 RX ADMIN — Medication 10 ML: at 10:00

## 2019-11-11 ASSESSMENT — PAIN SCALES - GENERAL
PAINLEVEL_OUTOF10: 1
PAINLEVEL_OUTOF10: 2
PAINLEVEL_OUTOF10: 1
PAINLEVEL_OUTOF10: 1
PAINLEVEL_OUTOF10: 2

## 2019-11-11 ASSESSMENT — PAIN DESCRIPTION - PAIN TYPE
TYPE: CHRONIC PAIN

## 2019-11-11 ASSESSMENT — PAIN DESCRIPTION - LOCATION
LOCATION: BACK

## 2019-11-11 ASSESSMENT — PAIN DESCRIPTION - FREQUENCY
FREQUENCY: CONTINUOUS

## 2019-11-11 ASSESSMENT — PAIN DESCRIPTION - DESCRIPTORS
DESCRIPTORS: ACHING

## 2019-11-11 ASSESSMENT — PAIN - FUNCTIONAL ASSESSMENT: PAIN_FUNCTIONAL_ASSESSMENT: 0-10

## 2019-11-25 ENCOUNTER — OFFICE VISIT (OUTPATIENT)
Dept: PAIN MANAGEMENT | Age: 44
End: 2019-11-25
Payer: COMMERCIAL

## 2019-11-25 VITALS
DIASTOLIC BLOOD PRESSURE: 76 MMHG | WEIGHT: 239 LBS | SYSTOLIC BLOOD PRESSURE: 126 MMHG | HEART RATE: 93 BPM | OXYGEN SATURATION: 96 % | HEIGHT: 70 IN | BODY MASS INDEX: 34.22 KG/M2

## 2019-11-25 DIAGNOSIS — M54.42 CHRONIC BILATERAL LOW BACK PAIN WITH BILATERAL SCIATICA: ICD-10-CM

## 2019-11-25 DIAGNOSIS — M47.817 LUMBOSACRAL SPONDYLOSIS WITHOUT MYELOPATHY: ICD-10-CM

## 2019-11-25 DIAGNOSIS — M54.41 CHRONIC BILATERAL LOW BACK PAIN WITH BILATERAL SCIATICA: ICD-10-CM

## 2019-11-25 DIAGNOSIS — G89.29 CHRONIC BILATERAL LOW BACK PAIN WITH BILATERAL SCIATICA: ICD-10-CM

## 2019-11-25 DIAGNOSIS — M51.26 DISPLACEMENT OF LUMBAR INTERVERTEBRAL DISC WITHOUT MYELOPATHY: ICD-10-CM

## 2019-11-25 PROCEDURE — 99213 OFFICE O/P EST LOW 20 MIN: CPT | Performed by: NURSE PRACTITIONER

## 2019-11-25 RX ORDER — TRAMADOL HYDROCHLORIDE 50 MG/1
50 TABLET ORAL EVERY 8 HOURS PRN
Qty: 90 TABLET | Refills: 0 | Status: SHIPPED | OUTPATIENT
Start: 2019-12-03 | End: 2019-12-26 | Stop reason: SDUPTHER

## 2019-11-25 RX ORDER — PREDNISONE 10 MG/1
20 TABLET ORAL
COMMUNITY
Start: 2019-11-23 | End: 2019-11-28

## 2019-11-25 ASSESSMENT — ENCOUNTER SYMPTOMS
CONSTIPATION: 0
COUGH: 0
BACK PAIN: 1
SHORTNESS OF BREATH: 0
RESPIRATORY NEGATIVE: 1
APHONIA: 0

## 2019-12-26 ENCOUNTER — OFFICE VISIT (OUTPATIENT)
Dept: PAIN MANAGEMENT | Age: 44
End: 2019-12-26
Payer: COMMERCIAL

## 2019-12-26 VITALS
HEART RATE: 115 BPM | HEIGHT: 70 IN | OXYGEN SATURATION: 97 % | BODY MASS INDEX: 33.93 KG/M2 | SYSTOLIC BLOOD PRESSURE: 127 MMHG | DIASTOLIC BLOOD PRESSURE: 77 MMHG | WEIGHT: 237 LBS

## 2019-12-26 DIAGNOSIS — G89.29 CHRONIC BILATERAL LOW BACK PAIN WITH BILATERAL SCIATICA: ICD-10-CM

## 2019-12-26 DIAGNOSIS — M54.42 CHRONIC BILATERAL LOW BACK PAIN WITH BILATERAL SCIATICA: ICD-10-CM

## 2019-12-26 DIAGNOSIS — M54.41 CHRONIC BILATERAL LOW BACK PAIN WITH BILATERAL SCIATICA: ICD-10-CM

## 2019-12-26 DIAGNOSIS — M47.817 LUMBOSACRAL SPONDYLOSIS WITHOUT MYELOPATHY: ICD-10-CM

## 2019-12-26 DIAGNOSIS — M51.26 DISPLACEMENT OF LUMBAR INTERVERTEBRAL DISC WITHOUT MYELOPATHY: ICD-10-CM

## 2019-12-26 DIAGNOSIS — Z79.891 CHRONIC USE OF OPIATE DRUG FOR THERAPEUTIC PURPOSE: ICD-10-CM

## 2019-12-26 PROCEDURE — 99214 OFFICE O/P EST MOD 30 MIN: CPT | Performed by: NURSE PRACTITIONER

## 2019-12-26 RX ORDER — GABAPENTIN 300 MG/1
300 CAPSULE ORAL EVERY 8 HOURS SCHEDULED
Qty: 90 CAPSULE | Refills: 2 | Status: SHIPPED | OUTPATIENT
Start: 2019-12-26 | End: 2020-03-23 | Stop reason: SDUPTHER

## 2019-12-26 RX ORDER — TRAMADOL HYDROCHLORIDE 50 MG/1
50 TABLET ORAL EVERY 8 HOURS PRN
Qty: 90 TABLET | Refills: 0 | Status: SHIPPED | OUTPATIENT
Start: 2020-01-30 | End: 2020-01-24 | Stop reason: SDUPTHER

## 2019-12-26 ASSESSMENT — ENCOUNTER SYMPTOMS
CONSTIPATION: 0
COUGH: 0
BACK PAIN: 1
SHORTNESS OF BREATH: 0

## 2020-01-24 ENCOUNTER — OFFICE VISIT (OUTPATIENT)
Dept: PAIN MANAGEMENT | Age: 45
End: 2020-01-24
Payer: COMMERCIAL

## 2020-01-24 VITALS
OXYGEN SATURATION: 96 % | SYSTOLIC BLOOD PRESSURE: 129 MMHG | DIASTOLIC BLOOD PRESSURE: 80 MMHG | HEART RATE: 96 BPM | WEIGHT: 244 LBS | BODY MASS INDEX: 34.93 KG/M2 | HEIGHT: 70 IN

## 2020-01-24 PROBLEM — M79.604 LUMBAR PAIN WITH RADIATION DOWN BOTH LEGS: Status: ACTIVE | Noted: 2020-01-24

## 2020-01-24 PROBLEM — M54.50 LUMBAR PAIN WITH RADIATION DOWN BOTH LEGS: Status: ACTIVE | Noted: 2020-01-24

## 2020-01-24 PROBLEM — M48.061 LUMBAR STENOSIS: Status: ACTIVE | Noted: 2020-01-24

## 2020-01-24 PROBLEM — M79.605 LUMBAR PAIN WITH RADIATION DOWN BOTH LEGS: Status: ACTIVE | Noted: 2020-01-24

## 2020-01-24 PROCEDURE — 99213 OFFICE O/P EST LOW 20 MIN: CPT | Performed by: NURSE PRACTITIONER

## 2020-01-24 RX ORDER — TRAMADOL HYDROCHLORIDE 50 MG/1
50 TABLET ORAL EVERY 8 HOURS PRN
Qty: 90 TABLET | Refills: 0 | Status: SHIPPED | OUTPATIENT
Start: 2020-01-29 | End: 2020-02-21 | Stop reason: SDUPTHER

## 2020-01-24 ASSESSMENT — ENCOUNTER SYMPTOMS
SHORTNESS OF BREATH: 0
CONSTIPATION: 1
BACK PAIN: 1
COUGH: 0
DIARRHEA: 0

## 2020-01-24 NOTE — PROGRESS NOTES
Patient is here today to review medication contract. Chief Complaint   Patient presents with    Medication Refill    Back Pain         [facility]     Patient complains of low back pain since [id number] with no known injury. He uses a cane for stability but states this is due to foot/ankle pain not back pain - he did have a fracture in the left ankle the past. In past he had lumbar steroid injection but steroids were discontinued because of psychiatric issue.  He is also using TENS unit and OTC lidocaine cream with good relief. . Henri Guerra states tramadol and RFA is managing his pain very well.      Procedures   [id number] 11/2019   bilat Radiofrequency ablation of median branches at the Transverse processes of L3 / L4 / L5 and S1 with 50% relief noted of lumbar pain but sciatica pain 100% relieved    HPI:   Back Pain   This is a chronic problem. The current episode started more than 1 year ago. The problem occurs constantly. The problem is unchanged. The pain is present in the lumbar spine. The quality of the pain is described as aching. The pain is at a severity of 2/10. The pain is mild. The pain is worse during the day. Pertinent negatives include no bladder incontinence, chest pain, fever, headaches, leg pain, numbness, paresthesias or tingling. Risk factors include lack of exercise and obesity. He has tried analgesics for the symptoms. The treatment provided mild relief.      Medication Refill:     Pain score Today:  2  Adverse effects (Constipation / Nausea / Sedation / sexual Dysfunction / others) : constipation  Mood: good  Sleep pattern and quality: fair  Activity level: good    Pill count Today: 12   Last dose taken was was last evening 1/23/2020  OARRS report reviewed today: yes  Morphine equivalent: 15    ER/Hospitalizations/PCP visit related to pain since last visit:no   Any legal problems e.g. DUI etc.:No  Satisfied with current management: Yes    Opioid Contract: 8/2/19  Last Urine Dug screen dated:

## 2020-02-21 ENCOUNTER — OFFICE VISIT (OUTPATIENT)
Dept: PAIN MANAGEMENT | Age: 45
End: 2020-02-21
Payer: COMMERCIAL

## 2020-02-21 VITALS
HEART RATE: 98 BPM | DIASTOLIC BLOOD PRESSURE: 75 MMHG | HEIGHT: 70 IN | WEIGHT: 246 LBS | SYSTOLIC BLOOD PRESSURE: 121 MMHG | OXYGEN SATURATION: 95 % | BODY MASS INDEX: 35.22 KG/M2

## 2020-02-21 PROCEDURE — 99213 OFFICE O/P EST LOW 20 MIN: CPT | Performed by: NURSE PRACTITIONER

## 2020-02-21 RX ORDER — GABAPENTIN 300 MG/1
300 CAPSULE ORAL EVERY 8 HOURS SCHEDULED
Qty: 90 CAPSULE | Refills: 2 | Status: CANCELLED | OUTPATIENT
Start: 2020-02-21 | End: 2020-03-22

## 2020-02-21 RX ORDER — CYCLOBENZAPRINE HCL 10 MG
10 TABLET ORAL 2 TIMES DAILY PRN
Qty: 60 TABLET | Refills: 3 | Status: CANCELLED | OUTPATIENT
Start: 2020-02-21 | End: 2020-03-22

## 2020-02-21 RX ORDER — TRAMADOL HYDROCHLORIDE 50 MG/1
50 TABLET ORAL EVERY 8 HOURS PRN
Qty: 90 TABLET | Refills: 0 | Status: SHIPPED | OUTPATIENT
Start: 2020-02-28 | End: 2020-03-23 | Stop reason: SDUPTHER

## 2020-02-21 ASSESSMENT — ENCOUNTER SYMPTOMS
CONSTIPATION: 0
COUGH: 0
BACK PAIN: 1
SHORTNESS OF BREATH: 0

## 2020-02-21 NOTE — PROGRESS NOTES
3-10-16    duramorph epidural steroid block duramorph 1.5 mg decadron 7.5 mg    NERVE BLOCK  12/12/2016    duramorph caudal epidural,decadron 7.5 mg, duramorph 1.5 mg    NERVE BLOCK  10/23/2017    duramorph, duramorph 1.5, decadron 10mg    NERVE BLOCK Bilateral 05/02/2019    bilat  medial branch nerve block   marcaine 1/4%    OTHER SURGICAL HISTORY Bilateral 05/09/2019    facet lumbar injection L4-S1    OTHER SURGICAL HISTORY Left 11/11/2019    Nerve Radiofrequency Ablation Left Lumbar Medial Branch L2-L5    RADIOFREQUENCY ABLATION NERVES Right 10/21/2019    NERVE RADIOFREQUENCY ABLATION RIGHT LUMBAR MEDIAL BRANCH L2-L5 performed by Jose R Whipple MD at [address] Left 11/11/2019    NERVE RADIOFREQUENCY ABLATION LEFT LUMBAR [facility] L2-L5 performed by Jose R Whipple MD at [place] [address] ENDOSCOPY      x1    URETHRAL STRICTURE DILATATION         Allergies   Allergen Reactions    Celebrex [Celecoxib] Shortness Of Breath     CHEST PAIN    Penicillins Hives and Nausea And Vomiting         Current Outpatient Medications:     traMADol (ULTRAM) 50 MG tablet, Take 1 tablet by mouth every 8 hours as needed for Pain for up to 30 days. Fill 1/29/20, Disp: 90 tablet, Rfl: 0    gabapentin (NEURONTIN) 300 MG capsule, Take 1 capsule by mouth every 8 hours for 30 days. , Disp: 90 capsule, Rfl: 2    sertraline (ZOLOFT) 50 MG tablet, Take 50 mg by mouth nightly , Disp: , Rfl:     risperiDONE (RISPERDAL) 2 MG tablet, Take 3 mg by mouth 2 times daily , Disp: , Rfl:     atorvastatin (LIPITOR) 40 MG tablet, TAKE ONE TABLET BY MOUTH ONE TIME A DAY (Patient taking differently: Take 40 mg by mouth nightly ), Disp: 90 tablet, Rfl: 3    docusate sodium (COLACE) 100 MG capsule, Take 1 capsule by mouth daily as needed for Constipation, Disp: 60 capsule, Rfl: 3    lidocaine (LMX) 4 % cream, Apply topically as needed for Pain Apply topically

## 2020-03-23 ENCOUNTER — OFFICE VISIT (OUTPATIENT)
Dept: PAIN MANAGEMENT | Age: 45
End: 2020-03-23
Payer: COMMERCIAL

## 2020-03-23 VITALS
HEART RATE: 91 BPM | WEIGHT: 240 LBS | HEIGHT: 70 IN | SYSTOLIC BLOOD PRESSURE: 144 MMHG | DIASTOLIC BLOOD PRESSURE: 85 MMHG | BODY MASS INDEX: 34.36 KG/M2 | OXYGEN SATURATION: 97 %

## 2020-03-23 PROCEDURE — 99213 OFFICE O/P EST LOW 20 MIN: CPT | Performed by: ANESTHESIOLOGY

## 2020-03-23 RX ORDER — GABAPENTIN 300 MG/1
300 CAPSULE ORAL EVERY 8 HOURS SCHEDULED
Qty: 90 CAPSULE | Refills: 0 | Status: SHIPPED | OUTPATIENT
Start: 2020-03-23 | End: 2020-04-21 | Stop reason: SDUPTHER

## 2020-03-23 RX ORDER — CYCLOBENZAPRINE HCL 10 MG
10 TABLET ORAL 2 TIMES DAILY PRN
Qty: 60 TABLET | Refills: 1 | Status: SHIPPED | OUTPATIENT
Start: 2020-03-23 | End: 2020-05-27 | Stop reason: SDUPTHER

## 2020-03-23 RX ORDER — TRAMADOL HYDROCHLORIDE 50 MG/1
50 TABLET ORAL EVERY 8 HOURS PRN
Qty: 90 TABLET | Refills: 0 | Status: SHIPPED | OUTPATIENT
Start: 2020-03-29 | End: 2020-04-21 | Stop reason: SDUPTHER

## 2020-03-23 ASSESSMENT — ENCOUNTER SYMPTOMS
RESPIRATORY NEGATIVE: 1
BACK PAIN: 1

## 2020-03-23 NOTE — PROGRESS NOTES
The patient is a 53 y. o. Non-/non  male. Chief Complaint   Patient presents with    Medication Refill    Back Pain        HPI    Back pain  Pain is chronic onset many years ago located in the lumbar area across midline affect both side extends over the proximal thigh  Describes the pain as aching nagging pain sensation  Diagnosed with facet mediated pain  Had lumbar radiofrequency ablation in November  Reports 70 to 80% improvement in back pain    On chronic medication management with tramadol gabapentin and Flexeril  Today here for medication refill  Denies any side effect from the medication  Takes the medication as prescribed  No sign or symptoms of any aberrancy      Medication Refill: Tramadol, Gabapentin, Flexeril    Pain score Today:  2  Adverse effects (Constipation / Nausea / Sedation / sexual Dysfunction / others) : no  Mood: good  Sleep pattern and quality: good  Activity level: good    Pill count Today:# 14 Tramadol  Last dose taken  3/22/20 AT 11:00 PM  OARRS report reviewed today: yes  ER/Hospitalizations/PCP visit related to pain since last visit:no   Any legal problems e.g. DUI etc.:No  Satisfied with current management: Yes    Opioid Contract:8/2/19  Last Urine Dug screen dated:12/26/19    Past Medical History, Past Surgical History, Social History, Allergies and Medications reviewed and updated in EPIC as indicated    Family History reviewed and is noncontributory.         Past Medical History:   Diagnosis Date    Back pain, chronic     Bipolar 2 disorder (HCC)     Bronchitis     Degenerative disc disease     Depression     Displacement of lumbar intervertebral disc without myelopathy 5/7/2013    Fibula fracture LEFT    History of gastric ulcer     History of suicidal tendencies     Hyperlipidemia     Lumbar pain with radiation down both legs     Lumbar stenosis     Non-union of fracture LEFT ANKLE    Osteoarthritis     Pneumonia     Sleep disturbance     Spider bite     on great toe on left    Spondylosis     Tenosynovitis of ankle LEFT      Past Surgical History:   Procedure Laterality Date    ADENOIDECTOMY      HC INJECT OTHER PERPHRL NERV Bilateral 5/9/2019    INJECTION FACET LUMBAR L4-S1 performed by Eloina Moses MD at [facility]  8/8/13    Duramorph, Celestone 9mg    NERVE BLOCK  4-24-14    duramorph epidural steroid block, celestone 9mg, morphine 1.25mg    NERVE BLOCK  12/19/2014    DURAMORPH - CELESTONE 9 MG- DURAMORPH  1.5MG    NERVE BLOCK  7/23/15    duramorph 1.5mg celestone 9mg    NERVE BLOCK  11/19/15    Tens-Empi Select    NERVE BLOCK  3-10-16    duramorph epidural steroid block duramorph 1.5 mg decadron 7.5 mg    NERVE BLOCK  12/12/2016    duramorph caudal epidural,decadron 7.5 mg, duramorph 1.5 mg    NERVE BLOCK  10/23/2017    duramorph, duramorph 1.5, decadron 10mg    NERVE BLOCK Bilateral 05/02/2019    bilat  medial branch nerve block   marcaine 1/4%    OTHER SURGICAL HISTORY Bilateral 05/09/2019    facet lumbar injection L4-S1    OTHER SURGICAL HISTORY Left 11/11/2019    Nerve Radiofrequency Ablation Left Lumbar Medial Branch L2-L5    RADIOFREQUENCY ABLATION NERVES Right 10/21/2019    NERVE RADIOFREQUENCY ABLATION RIGHT LUMBAR MEDIAL BRANCH L2-L5 performed by Tomas Jeong MD at [address] [place] [address] Left 11/11/2019    NERVE RADIOFREQUENCY ABLATION LEFT LUMBAR [address]ls L2-L5 performed by Tomas Jeong MD at [place] [address] ENDOSCOPY      x1    URETHRAL STRICTURE DILATATION       Social History     Socioeconomic History    Marital status:      Spouse name: None    Number of children: None    Years of education: None    Highest education level: None   Occupational History    None   Social Needs    Financial resource strain: None    Food insecurity     Worry: None     Inability: None    Transportation needs     Medical: (LIPITOR) 40 MG tablet TAKE ONE TABLET BY MOUTH ONE TIME A DAY (Patient taking differently: Take 40 mg by mouth nightly ) 90 tablet 3    docusate sodium (COLACE) 100 MG capsule Take 1 capsule by mouth daily as needed for Constipation 60 capsule 3    lidocaine (LMX) 4 % cream Apply topically as needed for Pain Apply topically as needed.  acetaminophen (TYLENOL) 325 MG tablet Take 650 mg by mouth as needed for Pain. No current facility-administered medications for this visit. Facility-Administered Medications Ordered in Other Visits   Medication Dose Route Frequency Provider Last Rate Last Dose    sodium chloride flush 0.9 % injection 10 mL  10 mL Intravenous 2 times per day Callie Cuellar MD        sodium chloride flush 0.9 % injection 10 mL  10 mL Intravenous PRN Callie Cuellar MD         Review of Systems   Constitutional: Negative. Negative for fever. Respiratory: Negative. Musculoskeletal: Positive for arthralgias, back pain, neck pain and neck stiffness. Neurological: Negative. Objective:  General Appearance:  Well-appearing and in no acute distress. Vital signs: (most recent): Blood pressure (!) 144/85, pulse 91, height 5' 10\" (1.778 m), weight 240 lb (108.9 kg), SpO2 97 %. Vital signs are normal.  No fever. HEENT: Normal HEENT exam.    Lungs:  Normal effort and normal respiratory rate. Breath sounds clear to auscultation. He is not in respiratory distress. Heart: Normal rate. Abdomen: Abdomen is soft. Extremities: Normal range of motion. Neurological: Patient is alert and oriented to person, place and time. Normal strength. Patient has normal reflexes, normal muscle tone and normal coordination. Pupils:  Pupils are equal, round, and reactive to light. Pupils are equal.   Skin:  Warm, dry and pale. No rash, ecchymosis, cyanosis or ulceration. Assessment & Plan  1. Chronic use of opiate drug for therapeutic purpose    2.  Displacement of

## 2020-04-21 ENCOUNTER — TELEMEDICINE (OUTPATIENT)
Dept: PAIN MANAGEMENT | Age: 45
End: 2020-04-21
Payer: COMMERCIAL

## 2020-04-21 VITALS — HEIGHT: 70 IN | BODY MASS INDEX: 34.36 KG/M2 | WEIGHT: 240 LBS

## 2020-04-21 PROCEDURE — 99213 OFFICE O/P EST LOW 20 MIN: CPT | Performed by: ANESTHESIOLOGY

## 2020-04-21 RX ORDER — TRAMADOL HYDROCHLORIDE 50 MG/1
50 TABLET ORAL EVERY 8 HOURS PRN
Qty: 90 TABLET | Refills: 0 | Status: SHIPPED | OUTPATIENT
Start: 2020-04-28 | End: 2020-05-27 | Stop reason: SDUPTHER

## 2020-04-21 RX ORDER — GABAPENTIN 300 MG/1
300 CAPSULE ORAL EVERY 8 HOURS SCHEDULED
Qty: 90 CAPSULE | Refills: 0 | Status: SHIPPED | OUTPATIENT
Start: 2020-04-22 | End: 2020-05-27 | Stop reason: SDUPTHER

## 2020-04-21 ASSESSMENT — ENCOUNTER SYMPTOMS
BACK PAIN: 1
RESPIRATORY NEGATIVE: 1

## 2020-04-21 NOTE — PROGRESS NOTES
Patient and provider were located at their individual homes. --Vernon Hendrix MD on 4/21/2020 at 9:07 AM    An electronic signature was used to authenticate this note.       Electronically signed by Vernon Hendrix MD on 4/21/2020 at 9:07 AM

## 2020-05-27 ENCOUNTER — TELEMEDICINE (OUTPATIENT)
Dept: PAIN MANAGEMENT | Age: 45
End: 2020-05-27
Payer: COMMERCIAL

## 2020-05-27 VITALS — HEIGHT: 70 IN | WEIGHT: 250 LBS | BODY MASS INDEX: 35.79 KG/M2

## 2020-05-27 PROCEDURE — 99214 OFFICE O/P EST MOD 30 MIN: CPT | Performed by: ANESTHESIOLOGY

## 2020-05-27 RX ORDER — GABAPENTIN 300 MG/1
300 CAPSULE ORAL EVERY 8 HOURS SCHEDULED
Qty: 90 CAPSULE | Refills: 0 | Status: SHIPPED | OUTPATIENT
Start: 2020-05-27 | End: 2020-06-24 | Stop reason: SDUPTHER

## 2020-05-27 RX ORDER — TRAMADOL HYDROCHLORIDE 50 MG/1
50 TABLET ORAL EVERY 8 HOURS PRN
Qty: 90 TABLET | Refills: 0 | Status: SHIPPED | OUTPATIENT
Start: 2020-05-28 | End: 2020-06-24 | Stop reason: SDUPTHER

## 2020-05-27 RX ORDER — CYCLOBENZAPRINE HCL 10 MG
10 TABLET ORAL 2 TIMES DAILY PRN
Qty: 60 TABLET | Refills: 1 | Status: SHIPPED | OUTPATIENT
Start: 2020-05-27 | End: 2020-06-26

## 2020-05-27 ASSESSMENT — ENCOUNTER SYMPTOMS
BACK PAIN: 1
RESPIRATORY NEGATIVE: 1

## 2020-05-27 NOTE — PROGRESS NOTES
The patient is a 53 y. o. Non-/non  male. Chief Complaint   Patient presents with    Back Pain    Medication Refill        HPI    -Back pain  Pain is chronic onset more than a year ago  Located in the lumbar area across midline affect both side  Extends over the hip and front of thigh  No radiation of pain below the knee level  Describes it as aching nagging stiffness  Was diagnosed with lumbar facet syndrome  Had radiofrequency ablation 7 months ago  Patient had 80% plus relief for 6 months  Symptoms are now returning back to its baseline  He is requesting repeat lumbar radiofrequency ablation    Medication management  Currently on low-dose opioid with tramadol 3 times a day  Denies any side effect from the medication  No history of illicit substance use    No other changes in health history  Medication Refill: Tramadol, flexeril, gabapentin    Pain score Today:  2  Adverse effects (Constipation / Nausea / Sedation / sexual Dysfunction / others) : no  Mood: good  Sleep pattern and quality: poor  Activity level: good    Pill count Today:no  Last dose taken  5/27/20  OARRS report reviewed today: yes  ER/Hospitalizations/PCP visit related to pain since last visit:no   Any legal problems e.g. DUI etc.:No  Satisfied with current management: Yes    Opioid Contract:8/2/19  Last Urine Dug screen dated:12/26/19      Past Medical History, Past Surgical History, Social History, Allergies and Medications reviewed and updated in EPIC as indicated    Family History reviewed and is noncontributory.         Past Medical History:   Diagnosis Date    Back pain, chronic     Bipolar 2 disorder (HCC)     Bronchitis     Degenerative disc disease     Depression     Displacement of lumbar intervertebral disc without myelopathy 5/7/2013    Fibula fracture LEFT    History of gastric ulcer     History of suicidal tendencies     Hyperlipidemia     Lumbar pain with radiation down both legs     Lumbar stenosis     Non-union of fracture LEFT ANKLE    Osteoarthritis     Pneumonia     Sleep disturbance     Spider bite     on great toe on left    Spondylosis     Tenosynovitis of ankle LEFT      Past Surgical History:   Procedure Laterality Date    ADENOIDECTOMY      HC INJECT OTHER PERPHRL NERV Bilateral 5/9/2019    INJECTION FACET LUMBAR L4-S1 performed by Yue Ulloa MD at [facility]  8/8/13    Duramorph, Celestone 9mg    NERVE BLOCK  4-24-14    duramorph epidural steroid block, celestone 9mg, morphine 1.25mg    NERVE BLOCK  12/19/2014    DURAMORPH - CELESTONE 9 MG- DURAMORPH  1.5MG    NERVE BLOCK  7/23/15    duramorph 1.5mg celestone 9mg    NERVE BLOCK  11/19/15    Tens-Empi Select    NERVE BLOCK  3-10-16    duramorph epidural steroid block duramorph 1.5 mg decadron 7.5 mg    NERVE BLOCK  12/12/2016    duramorph caudal epidural,decadron 7.5 mg, duramorph 1.5 mg    NERVE BLOCK  10/23/2017    duramorph, duramorph 1.5, decadron 10mg    NERVE BLOCK Bilateral 05/02/2019    bilat  medial branch nerve block   marcaine 1/4%    OTHER SURGICAL HISTORY Bilateral 05/09/2019    facet lumbar injection L4-S1    OTHER SURGICAL HISTORY Left 11/11/2019    Nerve Radiofrequency Ablation Left Lumbar Medial Branch L2-L5    RADIOFREQUENCY ABLATION NERVES Right 10/21/2019    NERVE RADIOFREQUENCY ABLATION RIGHT LUMBAR MEDIAL BRANCH L2-L5 performed by Jarvis Benedict MD at [address] [place] [address] Left 11/11/2019    NERVE RADIOFREQUENCY ABLATION LEFT LUMBAR Donalynn Justen L2-L5 performed by Jarvis Benedict MD at [place] [address] ENDOSCOPY      x1    URETHRAL STRICTURE DILATATION       Social History     Socioeconomic History    Marital status:      Spouse name: None    Number of children: None    Years of education: None    Highest education level: None   Occupational History    None   Social Needs    Financial resource strain: None    Food insecurity     Worry: None     Inability: None    Transportation needs     Medical: None     Non-medical: None   Tobacco Use    Smoking status: Former Smoker     Packs/day: 1.00     Years: 22.00     Pack years: 22.00     Types: Cigarettes    Smokeless tobacco: Never Used    Tobacco comment: 1/2 to 1 ppd   Substance and Sexual Activity    Alcohol use: No     Alcohol/week: 0.0 standard drinks    Drug use: No    Sexual activity: None   Lifestyle    Physical activity     Days per week: None     Minutes per session: None    Stress: None   Relationships    Social connections     Talks on phone: None     Gets together: None     Attends Scientologist service: None     Active member of club or organization: None     Attends meetings of clubs or organizations: None     Relationship status: None    Intimate partner violence     Fear of current or ex partner: None     Emotionally abused: None     Physically abused: None     Forced sexual activity: None   Other Topics Concern    None   Social History Narrative    None     Family History   Problem Relation Age of Onset    Breast Cancer Mother     Liver Cancer Mother     Other Father         brain cysts     Allergies   Allergen Reactions    Celebrex [Celecoxib] Shortness Of Breath     CHEST PAIN    Penicillins Hives and Nausea And Vomiting     Celebrex [celecoxib] and Penicillins   There were no vitals filed for this visit. Current Outpatient Medications   Medication Sig Dispense Refill    gabapentin (NEURONTIN) 300 MG capsule Take 1 capsule by mouth every 8 hours for 30 days. 90 capsule 0    [START ON 5/28/2020] traMADol (ULTRAM) 50 MG tablet Take 1 tablet by mouth every 8 hours as needed for Pain for up to 30 days.  Fill 1/29/20 90 tablet 0    cyclobenzaprine (FLEXERIL) 10 MG tablet Take 1 tablet by mouth 2 times daily as needed for Muscle spasms 60 tablet 1    sertraline (ZOLOFT) 50 MG tablet Take 50 mg by mouth nightly       risperiDONE

## 2020-05-28 ENCOUNTER — TELEPHONE (OUTPATIENT)
Dept: PAIN MANAGEMENT | Age: 45
End: 2020-05-28

## 2020-06-14 ENCOUNTER — HOSPITAL ENCOUNTER (OUTPATIENT)
Dept: PREADMISSION TESTING | Age: 45
Setting detail: SPECIMEN
Discharge: HOME | End: 2020-06-18
Payer: COMMERCIAL

## 2020-06-14 PROCEDURE — U0004 COV-19 TEST NON-CDC HGH THRU: HCPCS

## 2020-06-16 LAB
SARS-COV-2, PCR: NOT DETECTED
SARS-COV-2, RAPID: NORMAL
SARS-COV-2: NORMAL
SOURCE: NORMAL

## 2020-06-17 ENCOUNTER — TELEPHONE (OUTPATIENT)
Dept: PRIMARY CARE CLINIC | Age: 45
End: 2020-06-17

## 2020-06-18 ENCOUNTER — HOSPITAL ENCOUNTER (OUTPATIENT)
Age: 45
Setting detail: OUTPATIENT SURGERY
Discharge: HOME | End: 2020-06-18
Attending: ANESTHESIOLOGY | Admitting: ANESTHESIOLOGY
Payer: COMMERCIAL

## 2020-06-18 ENCOUNTER — APPOINTMENT (OUTPATIENT)
Dept: GENERAL RADIOLOGY | Age: 45
End: 2020-06-18
Attending: ANESTHESIOLOGY
Payer: COMMERCIAL

## 2020-06-18 VITALS
TEMPERATURE: 97.5 F | DIASTOLIC BLOOD PRESSURE: 71 MMHG | SYSTOLIC BLOOD PRESSURE: 112 MMHG | HEART RATE: 97 BPM | BODY MASS INDEX: 35.07 KG/M2 | HEIGHT: 70 IN | OXYGEN SATURATION: 97 % | WEIGHT: 245 LBS | RESPIRATION RATE: 16 BRPM

## 2020-06-18 PROCEDURE — 3600000054 HC PAIN LEVEL 3 BASE: Performed by: ANESTHESIOLOGY

## 2020-06-18 PROCEDURE — 64636 DESTROY L/S FACET JNT ADDL: CPT | Performed by: ANESTHESIOLOGY

## 2020-06-18 PROCEDURE — 99152 MOD SED SAME PHYS/QHP 5/>YRS: CPT | Performed by: ANESTHESIOLOGY

## 2020-06-18 PROCEDURE — 2709999900 HC NON-CHARGEABLE SUPPLY: Performed by: ANESTHESIOLOGY

## 2020-06-18 PROCEDURE — 3209999900 FLUORO FOR SURGICAL PROCEDURES

## 2020-06-18 PROCEDURE — 64635 DESTROY LUMB/SAC FACET JNT: CPT | Performed by: ANESTHESIOLOGY

## 2020-06-18 PROCEDURE — 2500000003 HC RX 250 WO HCPCS: Performed by: ANESTHESIOLOGY

## 2020-06-18 PROCEDURE — 3600000055 HC PAIN LEVEL 3 ADDL 15 MIN: Performed by: ANESTHESIOLOGY

## 2020-06-18 PROCEDURE — 7100000010 HC PHASE II RECOVERY - FIRST 15 MIN: Performed by: ANESTHESIOLOGY

## 2020-06-18 PROCEDURE — 6360000002 HC RX W HCPCS: Performed by: ANESTHESIOLOGY

## 2020-06-18 PROCEDURE — 7100000011 HC PHASE II RECOVERY - ADDTL 15 MIN: Performed by: ANESTHESIOLOGY

## 2020-06-18 PROCEDURE — 2580000003 HC RX 258: Performed by: ANESTHESIOLOGY

## 2020-06-18 PROCEDURE — 2720000010 HC SURG SUPPLY STERILE: Performed by: ANESTHESIOLOGY

## 2020-06-18 PROCEDURE — 99153 MOD SED SAME PHYS/QHP EA: CPT | Performed by: ANESTHESIOLOGY

## 2020-06-18 RX ORDER — LIDOCAINE HYDROCHLORIDE 40 MG/ML
INJECTION, SOLUTION RETROBULBAR; TOPICAL PRN
Status: DISCONTINUED | OUTPATIENT
Start: 2020-06-18 | End: 2020-06-18 | Stop reason: ALTCHOICE

## 2020-06-18 RX ORDER — LIDOCAINE HYDROCHLORIDE 10 MG/ML
INJECTION, SOLUTION INFILTRATION; PERINEURAL PRN
Status: DISCONTINUED | OUTPATIENT
Start: 2020-06-18 | End: 2020-06-18 | Stop reason: ALTCHOICE

## 2020-06-18 RX ORDER — FENTANYL CITRATE 50 UG/ML
INJECTION, SOLUTION INTRAMUSCULAR; INTRAVENOUS PRN
Status: DISCONTINUED | OUTPATIENT
Start: 2020-06-18 | End: 2020-06-18 | Stop reason: ALTCHOICE

## 2020-06-18 RX ORDER — SODIUM CHLORIDE 0.9 % (FLUSH) 0.9 %
10 SYRINGE (ML) INJECTION EVERY 12 HOURS SCHEDULED
Status: DISCONTINUED | OUTPATIENT
Start: 2020-06-18 | End: 2020-06-18 | Stop reason: HOSPADM

## 2020-06-18 RX ORDER — MIDAZOLAM HYDROCHLORIDE 1 MG/ML
INJECTION INTRAMUSCULAR; INTRAVENOUS PRN
Status: DISCONTINUED | OUTPATIENT
Start: 2020-06-18 | End: 2020-06-18 | Stop reason: ALTCHOICE

## 2020-06-18 RX ORDER — SODIUM CHLORIDE 0.9 % (FLUSH) 0.9 %
10 SYRINGE (ML) INJECTION PRN
Status: DISCONTINUED | OUTPATIENT
Start: 2020-06-18 | End: 2020-06-18 | Stop reason: HOSPADM

## 2020-06-18 RX ADMIN — SODIUM CHLORIDE, PRESERVATIVE FREE 10 ML: 5 INJECTION INTRAVENOUS at 06:35

## 2020-06-18 ASSESSMENT — PAIN SCALES - GENERAL
PAINLEVEL_OUTOF10: 0
PAINLEVEL_OUTOF10: 6
PAINLEVEL_OUTOF10: 0

## 2020-06-18 ASSESSMENT — PAIN DESCRIPTION - DESCRIPTORS: DESCRIPTORS: ACHING

## 2020-06-18 ASSESSMENT — PAIN - FUNCTIONAL ASSESSMENT: PAIN_FUNCTIONAL_ASSESSMENT: 0-10

## 2020-06-26 RX ORDER — GABAPENTIN 300 MG/1
300 CAPSULE ORAL EVERY 8 HOURS SCHEDULED
Qty: 90 CAPSULE | Refills: 0 | Status: SHIPPED | OUTPATIENT
Start: 2020-06-26 | End: 2020-07-22 | Stop reason: SDUPTHER

## 2020-06-26 RX ORDER — TRAMADOL HYDROCHLORIDE 50 MG/1
50 TABLET ORAL EVERY 8 HOURS PRN
Qty: 90 TABLET | Refills: 0 | Status: SHIPPED | OUTPATIENT
Start: 2020-06-26 | End: 2020-07-22 | Stop reason: SDUPTHER

## 2020-07-02 ENCOUNTER — HOSPITAL ENCOUNTER (OUTPATIENT)
Dept: PREADMISSION TESTING | Age: 45
Setting detail: SPECIMEN
Discharge: HOME | End: 2020-07-06
Payer: COMMERCIAL

## 2020-07-02 ENCOUNTER — TELEPHONE (OUTPATIENT)
Dept: PAIN MANAGEMENT | Age: 45
End: 2020-07-02

## 2020-07-02 LAB
SARS-COV-2, PCR: NORMAL
SARS-COV-2, RAPID: NORMAL
SARS-COV-2: NOT DETECTED
SOURCE: NORMAL

## 2020-07-02 PROCEDURE — U0003 INFECTIOUS AGENT DETECTION BY NUCLEIC ACID (DNA OR RNA); SEVERE ACUTE RESPIRATORY SYNDROME CORONAVIRUS 2 (SARS-COV-2) (CORONAVIRUS DISEASE [COVID-19]), AMPLIFIED PROBE TECHNIQUE, MAKING USE OF HIGH THROUGHPUT TECHNOLOGIES AS DESCRIBED BY CMS-2020-01-R: HCPCS

## 2020-07-02 NOTE — TELEPHONE ENCOUNTER
Spoke to [facility] and she states that patient insurance is still pending for Monday procedure is still pending. Writer informed patient, if the procedure is denied and he still go through with procedure he will receive a bill. Patient states he will still go the insurance should approve it.

## 2020-07-03 ENCOUNTER — TELEPHONE (OUTPATIENT)
Dept: PRIMARY CARE CLINIC | Age: 45
End: 2020-07-03

## 2020-07-06 ENCOUNTER — HOSPITAL ENCOUNTER (OUTPATIENT)
Age: 45
Setting detail: OUTPATIENT SURGERY
Discharge: HOME | End: 2020-07-06
Attending: ANESTHESIOLOGY | Admitting: ANESTHESIOLOGY
Payer: COMMERCIAL

## 2020-07-06 ENCOUNTER — APPOINTMENT (OUTPATIENT)
Dept: GENERAL RADIOLOGY | Age: 45
End: 2020-07-06
Attending: ANESTHESIOLOGY
Payer: COMMERCIAL

## 2020-07-06 VITALS
BODY MASS INDEX: 34.93 KG/M2 | DIASTOLIC BLOOD PRESSURE: 84 MMHG | HEIGHT: 70 IN | RESPIRATION RATE: 16 BRPM | OXYGEN SATURATION: 97 % | TEMPERATURE: 97.2 F | HEART RATE: 92 BPM | WEIGHT: 244 LBS | SYSTOLIC BLOOD PRESSURE: 137 MMHG

## 2020-07-06 PROCEDURE — 99152 MOD SED SAME PHYS/QHP 5/>YRS: CPT | Performed by: ANESTHESIOLOGY

## 2020-07-06 PROCEDURE — 7100000010 HC PHASE II RECOVERY - FIRST 15 MIN: Performed by: ANESTHESIOLOGY

## 2020-07-06 PROCEDURE — 2580000003 HC RX 258: Performed by: ANESTHESIOLOGY

## 2020-07-06 PROCEDURE — 7100000011 HC PHASE II RECOVERY - ADDTL 15 MIN: Performed by: ANESTHESIOLOGY

## 2020-07-06 PROCEDURE — 2709999900 HC NON-CHARGEABLE SUPPLY: Performed by: ANESTHESIOLOGY

## 2020-07-06 PROCEDURE — 3600000054 HC PAIN LEVEL 3 BASE: Performed by: ANESTHESIOLOGY

## 2020-07-06 PROCEDURE — 3600000055 HC PAIN LEVEL 3 ADDL 15 MIN: Performed by: ANESTHESIOLOGY

## 2020-07-06 PROCEDURE — 64635 DESTROY LUMB/SAC FACET JNT: CPT | Performed by: ANESTHESIOLOGY

## 2020-07-06 PROCEDURE — 2720000010 HC SURG SUPPLY STERILE: Performed by: ANESTHESIOLOGY

## 2020-07-06 PROCEDURE — 6360000002 HC RX W HCPCS: Performed by: ANESTHESIOLOGY

## 2020-07-06 PROCEDURE — 3209999900 FLUORO FOR SURGICAL PROCEDURES

## 2020-07-06 PROCEDURE — 64636 DESTROY L/S FACET JNT ADDL: CPT | Performed by: ANESTHESIOLOGY

## 2020-07-06 RX ORDER — SODIUM CHLORIDE 0.9 % (FLUSH) 0.9 %
10 SYRINGE (ML) INJECTION EVERY 12 HOURS SCHEDULED
Status: DISCONTINUED | OUTPATIENT
Start: 2020-07-06 | End: 2020-07-06 | Stop reason: HOSPADM

## 2020-07-06 RX ORDER — MIDAZOLAM HYDROCHLORIDE 1 MG/ML
INJECTION INTRAMUSCULAR; INTRAVENOUS PRN
Status: DISCONTINUED | OUTPATIENT
Start: 2020-07-06 | End: 2020-07-06 | Stop reason: ALTCHOICE

## 2020-07-06 RX ORDER — FENTANYL CITRATE 50 UG/ML
INJECTION, SOLUTION INTRAMUSCULAR; INTRAVENOUS PRN
Status: DISCONTINUED | OUTPATIENT
Start: 2020-07-06 | End: 2020-07-06 | Stop reason: ALTCHOICE

## 2020-07-06 RX ORDER — SODIUM CHLORIDE 0.9 % (FLUSH) 0.9 %
10 SYRINGE (ML) INJECTION PRN
Status: DISCONTINUED | OUTPATIENT
Start: 2020-07-06 | End: 2020-07-06 | Stop reason: HOSPADM

## 2020-07-06 RX ADMIN — SODIUM CHLORIDE, PRESERVATIVE FREE 10 ML: 5 INJECTION INTRAVENOUS at 06:50

## 2020-07-06 ASSESSMENT — PAIN SCALES - GENERAL
PAINLEVEL_OUTOF10: 2
PAINLEVEL_OUTOF10: 0

## 2020-07-06 ASSESSMENT — PAIN DESCRIPTION - DESCRIPTORS: DESCRIPTORS: SHOOTING

## 2020-07-06 ASSESSMENT — PAIN - FUNCTIONAL ASSESSMENT: PAIN_FUNCTIONAL_ASSESSMENT: 0-10

## 2020-07-06 NOTE — H&P
History and Physical Service   [facility]    HISTORY AND PHYSICAL EXAMINATION            Date of Evaluation: 7/6/2020  Patient name:  Audelia Esquivel  MRN:   [id number]  YOB: 1975  PCP:    Tee Salazar MD    History Obtained From:     Patient, Medical record    History of Present Illness: This is Audelia Esquivel a 53 y.o. male who presents today for a left medial branch L2-L5 nerve radiofrequency ablation by Dr. Ophelia Lo due to lumbosacral spondylosis w/o myelopathy, chronic bilateral low back w/o sciatica. The patient's chief complaint is 2-8/10 low back pain that has progressively worsened over the past several years. The low back pain radiates to bilateral legs. The pain is aggravated by bending over, prolonged sitting or standing; and is minimally relieved with Ultram and gabapentin. Prior treatment includes multiple nerve blocks. Bilateral legs have numbness and tingling. Pt denies loss of bowel or urinary function. Denies fever, chills, chest pain, dyspnea, rashes, open sores, and wounds. Pt denies history of diabetes. Pt denies taking blood thinning medications in the past 10 days.       Past Medical History:     Past Medical History:   Diagnosis Date    Back pain, chronic     Bipolar 2 disorder (HCC)     Bronchitis     Degenerative disc disease     Depression     Displacement of lumbar intervertebral disc without myelopathy 5/7/2013    Fibula fracture LEFT    History of gastric ulcer     History of suicidal tendencies     Hyperlipidemia     Lumbar pain with radiation down both legs     Lumbar stenosis     Non-union of fracture LEFT ANKLE    Osteoarthritis     Pneumonia     Sleep disturbance     Spider bite     on great toe on left    Spondylosis     Tenosynovitis of ankle LEFT        Past Surgical History:     Past Surgical History:   Procedure Laterality Date    ADENOIDECTOMY      [facility] INJECT OTHER PERPHRL NERV Bilateral 5/9/2019 INJECTION FACET LUMBAR L4-S1 performed by Kathi Mason MD at [facility]  8/8/13    Duramorph, Celestone 9mg    NERVE BLOCK  4-24-14    duramorph epidural steroid block, celestone 9mg, morphine 1.25mg    NERVE BLOCK  12/19/2014    DURAMORPH - CELESTONE 9 MG- DURAMORPH  1.5MG    NERVE BLOCK  7/23/15    duramorph 1.5mg celestone 9mg    NERVE BLOCK  11/19/15    Tens-Empi Select    NERVE BLOCK  3-10-16    duramorph epidural steroid block duramorph 1.5 mg decadron 7.5 mg    NERVE BLOCK  12/12/2016    duramorph caudal epidural,decadron 7.5 mg, duramorph 1.5 mg    NERVE BLOCK  10/23/2017    duramorph, duramorph 1.5, decadron 10mg    NERVE BLOCK Bilateral 05/02/2019    bilat  medial branch nerve block   marcaine 1/4%    OTHER SURGICAL HISTORY Bilateral 05/09/2019    facet lumbar injection L4-S1    OTHER SURGICAL HISTORY Left 11/11/2019    Nerve Radiofrequency Ablation Left Lumbar Medial Branch L2-L5    PAIN MANAGEMENT PROCEDURE Right 6/18/2020    NERVE RADIOFREQUENCY ABLATION RIGHT SIDED MEDIAL BRANCH   L2-L5 performed by Brianna Jarrell MD at [address] [place] [address] Right 10/21/2019    NERVE RADIOFREQUENCY ABLATION RIGHT LUMBAR MEDIAL BRANCH L2-L5 performed by Brianna Jarrell MD at [address] [place] [address] Left 11/11/2019    NERVE RADIOFREQUENCY ABLATION LEFT LUMBAR [facility] L2-L5 performed by Brianna Jarrell MD at [place] [address] ENDOSCOPY      x1    URETHRAL STRICTURE DILATATION          Medications Prior to Admission:     Prior to Admission medications    Medication Sig Start Date End Date Taking? Authorizing Provider   gabapentin (NEURONTIN) 300 MG capsule Take 1 capsule by mouth every 8 hours for 30 days. 6/26/20 7/26/20 Yes Brianna Jarrell MD   traMADol (ULTRAM) 50 MG tablet Take 1 tablet by mouth every 8 hours as needed for Pain for up to 30 days.  Fill 1/29/20 6/26/20 7/26/20 Yes Quick Benigno White MD   sertraline (ZOLOFT) 50 MG tablet Take 50 mg by mouth nightly    Yes Historical Provider, MD   risperiDONE (RISPERDAL) 2 MG tablet Take 3 mg by mouth 2 times daily    Yes Historical Provider, MD   atorvastatin (LIPITOR) 40 MG tablet TAKE ONE TABLET BY MOUTH ONE TIME A DAY  Patient taking differently: Take 40 mg by mouth nightly  6/26/18  Yes Ban Guevara MD   docusate sodium (COLACE) 100 MG capsule Take 1 capsule by mouth daily as needed for Constipation 10/19/17  Yes Lennox Gauss, MD   acetaminophen (TYLENOL) 325 MG tablet Take 650 mg by mouth as needed for Pain. Yes Historical Provider, MD   lidocaine (LMX) 4 % cream Apply topically as needed for Pain Apply topically as needed. Historical Provider, MD        Allergies:     Celebrex [celecoxib] and Penicillins    Social History:     Tobacco:    reports that he has quit smoking. His smoking use included cigarettes. He has a 22.00 pack-year smoking history. He has never used smokeless tobacco.  Alcohol:      reports no history of alcohol use. Drug Use:  reports no history of drug use. Family History:     Family History   Problem Relation Age of Onset   [employer] Breast Cancer Mother     Liver Cancer Mother     Other Father         brain cysts       Review of Systems:     Positive and Negative as described in HPI. CONSTITUTIONAL: Negative for fevers, chills, sweats, fatigue, and weight loss. HEENT: Pt wears glasses. Negative for hearing changes, rhinorrhea, and throat pain. RESPIRATORY: Negative for shortness of breath, cough, congestion, and wheezing. CARDIOVASCULAR: Negative for chest pain, blood clot, irregular heartbeat, and palpitations. GASTROINTESTINAL: Negative for reflux, nausea, vomiting, diarrhea, constipation, change in bowel habits, and abdominal pain. GENITOURINARY: Negative for difficulty of urination, burning with urination, and frequency. INTEGUMENT: Negative for rash, skin lesions, and easy bruising. HEMATOLOGIC/LYMPHATIC: Negative for swelling/edema. ALLERGIC/IMMUNOLOGIC: Negative for urticaria and itching. ENDOCRINE: Heat intolerance. Negative for increase in drinking, increase in urination, and cold intolerance. MUSCULOSKELETAL: See HPI. NEUROLOGICAL: Numbness and tingling in bilateral legs. Dizziness and lightheadedness when the pt quickly stands up. Negative for headaches. BEHAVIOR/PSYCH: Treated anxiety and depression. Physical Exam:   /74   Pulse 102   Temp 95.5 °F (35.3 °C)   Resp 16   Ht 5' 10\" (1.778 m)   Wt 244 lb (110.7 kg)   SpO2 96%   BMI 35.01 kg/m²   No results for input(s): POCGLU in the last 72 hours. General Appearance:  Alert, well appearing, and in no acute distress. Obese. Mental status: Oriented to person, place, and time. Head: Normocephalic and atraumatic. Eye: No icterus, redness, pupils equal and reactive, extraocular eye movements intact, and conjunctiva clear. Ear: Hearing grossly intact. Nose: No drainage noted. Mouth: Mucous membranes moist.  Neck: Supple and no carotid bruits noted. Lungs: Bilateral equal air entry, clear to auscultation, no wheezing, rales or rhonchi, and normal effort. Cardiovascular: Mild, asymptomatic tachycardia.  BPM. Regular rhythm. No murmur, gallop, and rub. Abdomen: Soft, non-tender, non-distended, and active bowel sounds. Neurologic: Normal speech and cranial nerves II through XII grossly intact. Strength 5/5 bilaterally. Skin: No gross lesions, rashes, bruising, or bleeding on exposed skin area. Extremities: Posterior tibial pulses 2+ bilaterally. No pedal edema. No calf tenderness with palpation. Psych: Normal affect. Investigations:      Laboratory Testing:  No results found for this or any previous visit (from the past 24 hour(s)).     No results for input(s): HGB, HCT, WBC, MCV, PLATELET, NA, K, CL, CO2, BUN, CREATININE, GLUCOSE, INR, PROTIME, APTT, AST, ALT, LABALBU, HCG in the last 720

## 2020-07-22 ENCOUNTER — OFFICE VISIT (OUTPATIENT)
Dept: PAIN MANAGEMENT | Age: 45
End: 2020-07-22
Payer: COMMERCIAL

## 2020-07-22 VITALS — WEIGHT: 245 LBS | HEIGHT: 70 IN | BODY MASS INDEX: 35.07 KG/M2 | TEMPERATURE: 97.8 F

## 2020-07-22 PROCEDURE — 99213 OFFICE O/P EST LOW 20 MIN: CPT | Performed by: ANESTHESIOLOGY

## 2020-07-22 RX ORDER — GABAPENTIN 300 MG/1
300 CAPSULE ORAL EVERY 8 HOURS SCHEDULED
Qty: 90 CAPSULE | Refills: 0 | Status: SHIPPED | OUTPATIENT
Start: 2020-07-22 | End: 2020-08-18 | Stop reason: SDUPTHER

## 2020-07-22 RX ORDER — TRAMADOL HYDROCHLORIDE 50 MG/1
50 TABLET ORAL EVERY 8 HOURS PRN
Qty: 90 TABLET | Refills: 0 | Status: SHIPPED | OUTPATIENT
Start: 2020-07-22 | End: 2020-08-18 | Stop reason: SDUPTHER

## 2020-07-22 ASSESSMENT — ENCOUNTER SYMPTOMS
BACK PAIN: 1
WHEEZING: 0
COUGH: 0

## 2020-07-22 NOTE — PROGRESS NOTES
The patient is a 50 y. o. Non-/non  male. Chief Complaint   Patient presents with    Medication Refill    Follow Up After Procedure    Back Pain        HPI      This is a pleasant 78-year-old man with a history of chronic axial lower back pain  Describes it as aching nagging stiffness in the lower back  Failed conservative measures  Diagnosed with lumbar facet mediated pain  Had repeat radiofrequency ablation  Today report more than 65% improvement in pain    Psych history of depression and mood disorder  Followed at [facility]  Currently taking risperidone and Zoloft    Patient is on low-dose tramadol along with gabapentin for chronic pain issues  Denies any side effect  Finds the medication helpful  Average pain score 2/10  Medication allows him to do daily life activities    S/P:Bilateral lumbar RFA 6/18 R, 7/6/2020 L      Outcome   Any improvement of activity?   Yes   Any side effects (appetite,leg cramping,facial fleshing):none   Increase of pain:  No  Pain score Today:  2  % of pain relief: 65%   Pain diary (medial branch block): No      Pain score Today:  2  Adverse effects (Constipation / Nausea / Sedation / sexual Dysfunction / others) : none  Mood: good  Sleep pattern and quality: good  Activity level: good    Pill count Today: #20  Last dose taken  7/22/2020  OARRS report reviewed today: yes  ER/Hospitalizations/PCP visit related to pain since last visit:no   Any legal problems e.g. DUI etc.:No  Satisfied with current management: Yes    Opioid Contract: 8/2/2019  Last Urine Dug screen dated: 12/26/2019    No results found for: LABA1C  No results found for: EAG    Past Medical History, Past Surgical History, Social History, Allergies and Medications, reviewed and updated in EPIC as indicated    Past Medical History:   Diagnosis Date    Back pain, chronic     Bipolar 2 disorder ([facility] [address]     Bronchitis     Degenerative disc disease     Depression     Displacement of lumbar intervertebral disc without myelopathy 5/7/2013    Fibula fracture LEFT    History of gastric ulcer     History of suicidal tendencies     Hyperlipidemia     Lumbar pain with radiation down both legs     Lumbar stenosis     Non-union of fracture LEFT ANKLE    Osteoarthritis     Pneumonia     Sleep disturbance     Spider bite     on great toe on left    Spondylosis     Tenosynovitis of ankle LEFT      Past Surgical History:   Procedure Laterality Date    ADENOIDECTOMY      HC INJECT OTHER PERPHRL NERV Bilateral 5/9/2019    INJECTION FACET LUMBAR L4-S1 performed by Negrito Carbone MD at [facility]  8/8/13    Duramorph, Celestone 9mg    NERVE BLOCK  4-24-14    duramorph epidural steroid block, celestone 9mg, morphine 1.25mg    NERVE BLOCK  12/19/2014    DURAMORPH - CELESTONE 9 MG- DURAMORPH  1.5MG    NERVE BLOCK  7/23/15    duramorph 1.5mg celestone 9mg    NERVE BLOCK  11/19/15    Tens-Empi Select    NERVE BLOCK  3-10-16    duramorph epidural steroid block duramorph 1.5 mg decadron 7.5 mg    NERVE BLOCK  12/12/2016    duramorph caudal epidural,decadron 7.5 mg, duramorph 1.5 mg    NERVE BLOCK  10/23/2017    duramorph, duramorph 1.5, decadron 10mg    NERVE BLOCK Bilateral 05/02/2019    bilat  medial branch nerve block   marcaine 1/4%    OTHER SURGICAL HISTORY Bilateral 05/09/2019    facet lumbar injection L4-S1    OTHER SURGICAL HISTORY Left 11/11/2019    Nerve Radiofrequency Ablation Left Lumbar Medial Branch L2-L5    PAIN MANAGEMENT PROCEDURE Right 6/18/2020    NERVE RADIOFREQUENCY ABLATION RIGHT SIDED MEDIAL BRANCH   L2-L5 performed by Sean Mallory MD at [address]  Left 7/6/2020    LEFT MEDIAL BRANCH L2-L5 NERVE RADIOFREQUENCY ABLATION performed by Sean Mallory MD at [facility] Right 10/21/2019    NERVE RADIOFREQUENCY ABLATION RIGHT LUMBAR MEDIAL BRANCH L2-L5 performed by Sean Mallory MD at [facility] RADIOFREQUENCY ABLATION NERVES Left 11/11/2019    NERVE RADIOFREQUENCY ABLATION LEFT LUMBAR MEIDAL BRANCH L2-L5 performed by Suzanne Wu MD at [place] [address] ENDOSCOPY      x1    URETHRAL STRICTURE DILATATION       Social History     Socioeconomic History    Marital status:      Spouse name: None    Number of children: None    Years of education: None    Highest education level: None   Occupational History    None   Social Needs    Financial resource strain: None    Food insecurity     Worry: None     Inability: None    Transportation needs     Medical: None     Non-medical: None   Tobacco Use    Smoking status: Former Smoker     Packs/day: 1.00     Years: 22.00     Pack years: 22.00     Types: Cigarettes    Smokeless tobacco: Never Used    Tobacco comment: 1/2 to 1 ppd   Substance and Sexual Activity    Alcohol use: No     Alcohol/week: 0.0 standard drinks    Drug use: No    Sexual activity: None   Lifestyle    Physical activity     Days per week: None     Minutes per session: None    Stress: None   Relationships    Social connections     Talks on phone: None     Gets together: None     Attends Pentecostal service: None     Active member of club or organization: None     Attends meetings of clubs or organizations: None     Relationship status: None    Intimate partner violence     Fear of current or ex partner: None     Emotionally abused: None     Physically abused: None     Forced sexual activity: None   Other Topics Concern    None   Social History Narrative    None     Family History   Problem Relation Age of Onset    Breast Cancer Mother     Liver Cancer Mother     Other Father         brain cysts     Allergies   Allergen Reactions    Celebrex [Celecoxib] Shortness Of Breath     CHEST PAIN    Penicillins Hives and Nausea And Vomiting     Celebrex [celecoxib] and Penicillins   Vitals:    07/22/20 1512   Temp: 97.8 °F (36.6 °C)     Current Outpatient Medications   Medication Sig Dispense Refill    gabapentin (NEURONTIN) 300 MG capsule Take 1 capsule by mouth every 8 hours for 30 days. 90 capsule 0    traMADol (ULTRAM) 50 MG tablet Take 1 tablet by mouth every 8 hours as needed for Pain for up to 30 days. Fill 1/29/20 90 tablet 0    sertraline (ZOLOFT) 50 MG tablet 200 mg nightly       risperiDONE (RISPERDAL) 2 MG tablet Take 3 mg by mouth 2 times daily       atorvastatin (LIPITOR) 40 MG tablet TAKE ONE TABLET BY MOUTH ONE TIME A DAY (Patient taking differently: Take 40 mg by mouth nightly ) 90 tablet 3    docusate sodium (COLACE) 100 MG capsule Take 1 capsule by mouth daily as needed for Constipation 60 capsule 3    lidocaine (LMX) 4 % cream Apply topically as needed for Pain Apply topically as needed.  acetaminophen (TYLENOL) 325 MG tablet Take 650 mg by mouth as needed for Pain. No current facility-administered medications for this visit. Review of Systems   Constitutional: Negative for chills and fever. Respiratory: Negative for cough and wheezing. Musculoskeletal: Positive for back pain. Neurological: Negative for weakness and numbness. Objective:  General Appearance:  Comfortable, well-appearing and in no acute distress. Vital signs: (most recent): Temperature 97.8 °F (36.6 °C), height 5' 10\" (1.778 m), weight 245 lb (111.1 kg). Vital signs are normal.  No fever. HEENT: Normal HEENT exam.    Lungs:  Normal effort. Breath sounds clear to auscultation. Heart: Normal rate. Abdomen: Abdomen is soft. Extremities: Normal range of motion. Neurological: Patient is alert and oriented to person, place and time. Normal strength. Patient has normal reflexes, normal muscle tone and normal coordination. Pupils:  Pupils are equal, round, and reactive to light. Skin:  Warm, dry and pale. No rash, ecchymosis, cyanosis or ulceration. Assessment & Plan  1.  Chronic use of opiate drug for therapeutic purpose    2. Chronic bilateral low back pain without sciatica    3. Lumbosacral spondylosis without myelopathy          Chronic axial lumbar spinal pain  Onset many years ago  Progressively worsened over time  Interfere with quality of life  Diagnosed with lumbar facet mediated  65% improvement with radiofrequency ablation    On tramadol and gabapentin for chronic back pain issues  Denies side effect  Automated prescription report reviewed  No sign or symptom of any aberrancy  Safe use of medication discussed with patient  Last urine toxicology was more than 6 months ago  We will obtain urine for toxicology to come monitor compliance  Refill ordered  Follow-up in 3 months    Orders Placed This Encounter   Procedures    Urine Drug Screen, Comprehensive      Orders Placed This Encounter   Medications    gabapentin (NEURONTIN) 300 MG capsule     Sig: Take 1 capsule by mouth every 8 hours for 30 days. Dispense:  90 capsule     Refill:  0    traMADol (ULTRAM) 50 MG tablet     Sig: Take 1 tablet by mouth every 8 hours as needed for Pain for up to 30 days. Fill 1/29/20     Dispense:  90 tablet     Refill:  0     Reduce doses taken as pain becomes manageable      Controlled Substance Monitoring:    Acute and Chronic Pain Monitoring:   RX Monitoring 7/22/2020   Attestation -   Periodic Controlled Substance Monitoring Possible medication side effects, risk of tolerance/dependence & alternative treatments discussed. ;No signs of potential drug abuse or diversion identified. ;Assessed functional status. ;Obtaining appropriate analgesic effect of treatment. Chronic Pain > 50 MEDD Obtained or confirmed \"Consent for Opioid Use\" on file.    Chronic Pain > 80 MEDD -           Electronically signed by Gita Sutherland MD on 7/22/2020 at 3:37 PM     Urine toxicology July 22, 0163  No illicit substance  Validity testing satisfactory  Positive for tramadol  Consistent with treatment

## 2020-08-18 ENCOUNTER — OFFICE VISIT (OUTPATIENT)
Dept: PAIN MANAGEMENT | Age: 45
End: 2020-08-18
Payer: COMMERCIAL

## 2020-08-18 VITALS
DIASTOLIC BLOOD PRESSURE: 82 MMHG | BODY MASS INDEX: 35.65 KG/M2 | WEIGHT: 249 LBS | SYSTOLIC BLOOD PRESSURE: 128 MMHG | RESPIRATION RATE: 22 BRPM | HEART RATE: 113 BPM | HEIGHT: 70 IN | TEMPERATURE: 97.5 F | OXYGEN SATURATION: 96 %

## 2020-08-18 PROCEDURE — 99213 OFFICE O/P EST LOW 20 MIN: CPT | Performed by: ANESTHESIOLOGY

## 2020-08-18 RX ORDER — TRAMADOL HYDROCHLORIDE 50 MG/1
50 TABLET ORAL EVERY 8 HOURS PRN
Qty: 90 TABLET | Refills: 1 | Status: SHIPPED | OUTPATIENT
Start: 2020-08-18 | End: 2020-10-14 | Stop reason: SDUPTHER

## 2020-08-18 RX ORDER — CYCLOBENZAPRINE HCL 10 MG
TABLET ORAL
COMMUNITY
Start: 2020-07-24 | End: 2020-10-14 | Stop reason: SDUPTHER

## 2020-08-18 RX ORDER — GABAPENTIN 300 MG/1
300 CAPSULE ORAL EVERY 8 HOURS SCHEDULED
Qty: 90 CAPSULE | Refills: 1 | Status: SHIPPED | OUTPATIENT
Start: 2020-08-18 | End: 2020-10-14 | Stop reason: SDUPTHER

## 2020-08-18 RX ORDER — SERTRALINE HYDROCHLORIDE 100 MG/1
TABLET, FILM COATED ORAL
COMMUNITY
Start: 2020-08-01

## 2020-08-18 ASSESSMENT — ENCOUNTER SYMPTOMS
BACK PAIN: 1
RESPIRATORY NEGATIVE: 1

## 2020-08-18 NOTE — PROGRESS NOTES
LEFT ANKLE    Osteoarthritis     Pneumonia     Sleep disturbance     Spider bite     on great toe on left    Spondylosis     Tenosynovitis of ankle LEFT      Past Surgical History:   Procedure Laterality Date    ADENOIDECTOMY      HC INJECT OTHER PERPHRL NERV Bilateral 5/9/2019    INJECTION FACET LUMBAR L4-S1 performed by [address], MD at [facility]  8/8/13    Duramorph, Celestone 9mg    NERVE BLOCK  4-24-14    duramorph epidural steroid block, celestone 9mg, morphine 1.25mg    NERVE BLOCK  12/19/2014    DURAMORPH - CELESTONE 9 MG- DURAMORPH  1.5MG    NERVE BLOCK  7/23/15    duramorph 1.5mg celestone 9mg    NERVE BLOCK  11/19/15    Tens-Empi Select    NERVE BLOCK  3-10-16    duramorph epidural steroid block duramorph 1.5 mg decadron 7.5 mg    NERVE BLOCK  12/12/2016    duramorph caudal epidural,decadron 7.5 mg, duramorph 1.5 mg    NERVE BLOCK  10/23/2017    duramorph, duramorph 1.5, decadron 10mg    NERVE BLOCK Bilateral 05/02/2019    bilat  medial branch nerve block   marcaine 1/4%    OTHER SURGICAL HISTORY Bilateral 05/09/2019    facet lumbar injection L4-S1    OTHER SURGICAL HISTORY Left 11/11/2019    Nerve Radiofrequency Ablation Left Lumbar Medial Branch L2-L5    PAIN MANAGEMENT PROCEDURE Right 6/18/2020    NERVE RADIOFREQUENCY ABLATION RIGHT SIDED MEDIAL BRANCH   L2-L5 performed by Coretta Angulo MD at [facility] Dates  Left 7/6/2020    LEFT MEDIAL BRANCH L2-L5 NERVE RADIOFREQUENCY ABLATION performed by Coretta Angulo MD at [address] [place] [address] Right 10/21/2019    NERVE RADIOFREQUENCY ABLATION RIGHT LUMBAR MEDIAL BRANCH L2-L5 performed by Coretta Angulo MD at [address] [place] [address] Left 11/11/2019    NERVE RADIOFREQUENCY ABLATION LEFT LUMBAR MEIDAL BRANCH L2-L5 performed by Coretta Angulo MD at [address] hours for 30 days. 90 capsule 1    traMADol (ULTRAM) 50 MG tablet Take 1 tablet by mouth every 8 hours as needed for Pain for up to 30 days. Fill 1/29/20 90 tablet 1    risperiDONE (RISPERDAL) 2 MG tablet Take 3 mg by mouth 2 times daily       atorvastatin (LIPITOR) 40 MG tablet TAKE ONE TABLET BY MOUTH ONE TIME A DAY (Patient taking differently: Take 40 mg by mouth nightly ) 90 tablet 3    docusate sodium (COLACE) 100 MG capsule Take 1 capsule by mouth daily as needed for Constipation 60 capsule 3    lidocaine (LMX) 4 % cream Apply topically as needed for Pain Apply topically as needed.  acetaminophen (TYLENOL) 325 MG tablet Take 650 mg by mouth as needed for Pain.  sertraline (ZOLOFT) 100 MG tablet        No current facility-administered medications for this visit. Review of Systems   Constitutional: Negative. Negative for fever. Respiratory: Negative. Musculoskeletal: Positive for back pain and neck pain. Neurological: Negative. Objective:  General Appearance:  Well-appearing and in no acute distress. Vital signs: (most recent): Blood pressure 128/82, pulse 113, temperature 97.5 °F (36.4 °C), resp. rate 22, height 5' 10\" (1.778 m), weight 249 lb (112.9 kg), SpO2 96 %. Vital signs are normal.  No fever. Output: Producing urine and producing stool. HEENT: Normal HEENT exam.    Lungs:  Normal effort and normal respiratory rate. Breath sounds clear to auscultation. He is not in respiratory distress. Heart: Normal rate. Extremities: Normal range of motion. There is no deformity. Neurological: Patient is alert and oriented to person, place and time. Patient has normal coordination. Pupils:  Pupils are equal, round, and reactive to light. Pupils are equal.   Skin:  Warm and dry. No rash or cyanosis. Assessment & Plan  1. Chronic use of opiate drug for therapeutic purpose    2. Chronic bilateral low back pain without sciatica    3.  Lumbosacral spondylosis without myelopathy        Axial lumbar spinal pain  Diagnosed with lumbar facet syndrome  Status post radiofrequency ablation  Report overall 60% plus improvement in axial back pain    Automated prescription report reviewed  Stable on low-dose opioid therapy  Deny any side effect  Finds the medication helpful  No sign or symptom of any aberrancy  Safe use of medication discussed  Refill ordered    Follow-up in 2 months  No orders of the defined types were placed in this encounter. Orders Placed This Encounter   Medications    gabapentin (NEURONTIN) 300 MG capsule     Sig: Take 1 capsule by mouth every 8 hours for 30 days. Dispense:  90 capsule     Refill:  1    traMADol (ULTRAM) 50 MG tablet     Sig: Take 1 tablet by mouth every 8 hours as needed for Pain for up to 30 days.  Fill 1/29/20     Dispense:  90 tablet     Refill:  1     Reduce doses taken as pain becomes manageable          Electronically signed by Jennifer Navarro MD on 8/18/2020 at 3:35 PM

## 2020-10-14 ENCOUNTER — OFFICE VISIT (OUTPATIENT)
Dept: PAIN MANAGEMENT | Age: 45
End: 2020-10-14
Payer: COMMERCIAL

## 2020-10-14 VITALS
HEIGHT: 70 IN | BODY MASS INDEX: 36.51 KG/M2 | WEIGHT: 255 LBS | HEART RATE: 81 BPM | TEMPERATURE: 97 F | DIASTOLIC BLOOD PRESSURE: 76 MMHG | SYSTOLIC BLOOD PRESSURE: 126 MMHG

## 2020-10-14 PROCEDURE — 99213 OFFICE O/P EST LOW 20 MIN: CPT | Performed by: ANESTHESIOLOGY

## 2020-10-14 RX ORDER — GABAPENTIN 300 MG/1
300 CAPSULE ORAL EVERY 8 HOURS SCHEDULED
Qty: 90 CAPSULE | Refills: 1 | Status: SHIPPED | OUTPATIENT
Start: 2020-10-14 | End: 2020-12-09 | Stop reason: SDUPTHER

## 2020-10-14 RX ORDER — CYCLOBENZAPRINE HCL 10 MG
10 TABLET ORAL DAILY PRN
Qty: 30 TABLET | Refills: 1 | Status: SHIPPED | OUTPATIENT
Start: 2020-10-14 | End: 2020-11-13

## 2020-10-14 RX ORDER — TRAMADOL HYDROCHLORIDE 50 MG/1
50 TABLET ORAL EVERY 8 HOURS PRN
Qty: 90 TABLET | Refills: 1 | Status: SHIPPED | OUTPATIENT
Start: 2020-10-14 | End: 2020-12-09 | Stop reason: SDUPTHER

## 2020-10-14 ASSESSMENT — ENCOUNTER SYMPTOMS
BACK PAIN: 1
RESPIRATORY NEGATIVE: 1
CONSTIPATION: 1

## 2020-10-14 NOTE — PROGRESS NOTES
The patient is a 50 y. o. Non-/non  male. Chief Complaint   Patient presents with    Medication Refill    Back Pain        HPI  24-year-old pleasant gentleman with history of chronic axial lumbar spinal pain  Diagnosed with lumbar facet pain  Describes the pain as aching nagging stiffness in the lower back  No significant radiation in leg  Pain aggravated with excessive activity  Alleviating factors include rest and medication  Currently takes tramadol and gabapentin  Denies any side effect from the medication  Finds the medication helpful allowing him to do daily life activity  He is diagnosed with lumbar facet mediated pain and had radiofrequency ablation 4 months ago with the 70 to 80% improvement in his back pain  Symptoms are currently stable  Medication Refill:     Pain score Today: 2  Adverse effects (Constipation / Nausea / Sedation / sexual Dysfunction / others) : Constipation  Mood: good  Sleep pattern and quality: poor  Activity level: good    Pill count Today:30  Last dose taken  10/14/2020   OARRS report reviewed today: yes  ER/Hospitalizations/PCP visit related to pain since last visit:no   Any legal problems e.g. DUI etc.:No  Satisfied with current management: Yes    Opioid Contract:08/02/2019   Last Urine Dug screen dated:07/22/2020     No results found for: LABA1C  No results found for: EAG    Past Medical History, Past Surgical History, Social History, Allergies and Medications reviewed and updated in EPIC as indicated    Family History reviewed and is noncontributory.         Past Medical History:   Diagnosis Date    Back pain, chronic     Bipolar 2 disorder (HCC)     Bronchitis     Degenerative disc disease     Depression     Displacement of lumbar intervertebral disc without myelopathy 5/7/2013    Fibula fracture LEFT    History of gastric ulcer     History of suicidal tendencies     Hyperlipidemia     Lumbar pain with radiation down both legs     Lumbar stenosis x1    URETHRAL STRICTURE DILATATION       Social History     Socioeconomic History    Marital status:      Spouse name: None    Number of children: None    Years of education: None    Highest education level: None   Occupational History    None   Social Needs    Financial resource strain: None    Food insecurity     Worry: None     Inability: None    Transportation needs     Medical: None     Non-medical: None   Tobacco Use    Smoking status: Former Smoker     Packs/day: 1.00     Years: 22.00     Pack years: 22.00     Types: Cigarettes    Smokeless tobacco: Never Used    Tobacco comment: 1/2 to 1 ppd   Substance and Sexual Activity    Alcohol use: No     Alcohol/week: 0.0 standard drinks    Drug use: No    Sexual activity: None   Lifestyle    Physical activity     Days per week: None     Minutes per session: None    Stress: None   Relationships    Social connections     Talks on phone: None     Gets together: None     Attends [employer] service: None     Active member of club or organization: None     Attends meetings of clubs or organizations: None     Relationship status: None    Intimate partner violence     Fear of current or ex partner: None     Emotionally abused: None     Physically abused: None     Forced sexual activity: None   Other Topics Concern    None   Social History Narrative    None     Family History   Problem Relation Age of Onset    Breast Cancer Mother     Liver Cancer Mother     Other Father         brain cysts     Allergies   Allergen Reactions    Celebrex [Celecoxib] Shortness Of Breath     CHEST PAIN    Penicillins Hives and Nausea And Vomiting     Celebrex [celecoxib] and Penicillins   Vitals:    10/14/20 1504   BP: 126/76   Pulse: 81   Temp: 97 °F (36.1 °C)     Current Outpatient Medications   Medication Sig Dispense Refill    traMADol (ULTRAM) 50 MG tablet Take 1 tablet by mouth every 8 hours as needed for Pain for up to 30 days.  Fill 1/29/20 90 tablet chronic axial lumbar spinal pain  Diagnosed with lumbar facet pain  Describes the pain as aching nagging stiffness in the lower back  No significant radiation in leg  Pain aggravated with excessive activity  Alleviating factors include rest and medication  Currently takes tramadol and gabapentin  Denies any side effect from the medication  Finds the medication helpful allowing him to do daily life activity  He is diagnosed with lumbar facet mediated pain and had radiofrequency ablation 4 months ago with the 70 to 80% improvement in his back pain  Symptoms are currently stable    1. Chronic use of opiate drug for therapeutic purpose    2. Chronic bilateral low back pain without sciatica    3. Lumbosacral spondylosis without myelopathy        No orders of the defined types were placed in this encounter. Orders Placed This Encounter   Medications    traMADol (ULTRAM) 50 MG tablet     Sig: Take 1 tablet by mouth every 8 hours as needed for Pain for up to 30 days. Fill 1/29/20     Dispense:  90 tablet     Refill:  1     Reduce doses taken as pain becomes manageable    gabapentin (NEURONTIN) 300 MG capsule     Sig: Take 1 capsule by mouth every 8 hours for 30 days. Dispense:  90 capsule     Refill:  1    cyclobenzaprine (FLEXERIL) 10 MG tablet     Sig: Take 1 tablet by mouth daily as needed for Muscle spasms     Dispense:  30 tablet     Refill:  1      Controlled Substance Monitoring:    Acute and Chronic Pain Monitoring:   RX Monitoring 10/14/2020   Attestation -   Periodic Controlled Substance Monitoring Possible medication side effects, risk of tolerance/dependence & alternative treatments discussed. ;No signs of potential drug abuse or diversion identified. ;Assessed functional status. ;Obtaining appropriate analgesic effect of treatment. Chronic Pain > 50 MEDD Obtained or confirmed \"Consent for Opioid Use\" on file.    Chronic Pain > 80 MEDD -             Electronically signed by Manas Kerr MD on

## 2020-12-07 RX ORDER — TRAZODONE HYDROCHLORIDE 100 MG/1
100 TABLET ORAL NIGHTLY
Status: ON HOLD | COMMUNITY
End: 2021-02-15

## 2020-12-07 RX ORDER — CYCLOBENZAPRINE HCL 10 MG
10 TABLET ORAL 3 TIMES DAILY PRN
COMMUNITY
End: 2020-12-07 | Stop reason: SDUPTHER

## 2020-12-07 ASSESSMENT — ENCOUNTER SYMPTOMS
BACK PAIN: 1
GASTROINTESTINAL NEGATIVE: 1
RESPIRATORY NEGATIVE: 1
EYES NEGATIVE: 1
ALLERGIC/IMMUNOLOGIC NEGATIVE: 1

## 2020-12-07 NOTE — PROGRESS NOTES
The patient is a 50 y. o. Non-/non  male. Chief Complaint   Patient presents with    Medication Refill    Back Pain     Lumbar area        HPI         38-year-old man with history of chronic lower back pain onset of symptom many years ago  Pain located in the lumbar area across midline describes it as aching nagging stiffness in the lower back left side more than right  No radiation of pain in leg  No associated numbness or paresthesia  Pain aggravated with excessive activity  Patient is diagnosed with lumbar spondylosis and lumbar facet mediated pain  He had radiofrequency ablation in June 2020 with 70 to 80% relief  His pain is currently stable  He is currently on low-dose tramadol along with Flexeril and gabapentin  Finds the medication helpful  Report no side effect from the medication  No history of illicit substance use     Medication Refill: Gabapentin 300mg, Tramadol 50mg, Cyclobenzaprine HCL 10mg    Patient stated that  currently prescribes gabapentin 300mg, tramadol 50mg, & cyclobenzaprine 10mg     Pain score Today:  2  Adverse effects (Constipation / Nausea / Sedation / sexual Dysfunction / others) : None  Mood: fair  Sleep pattern and quality: fair  Activity level: fair    Pill count Today: 0   Last dose taken  12/07/20  OARRS report reviewed today: yes  ER/Hospitalizations/PCP visit related to pain since last visit:no   Any legal problems e.g. DUI etc.:No  Satisfied with current management: Yes    Opioid Contract: 08/02/2019   Last Urine Dug screen dated: 07/22/2020     No results found for: LABA1C  No results found for: EAG    Past Medical History, Past Surgical History, Social History, Allergies and Medications reviewed and updated in EPIC as indicated    Family History reviewed and is noncontributory.             Past Medical History:   Diagnosis Date    Back pain, chronic     Bipolar 2 disorder (HCC)     Bronchitis     Degenerative disc disease     Depression     Displacement of lumbar intervertebral disc without myelopathy 5/7/2013    Fibula fracture LEFT    History of gastric ulcer     History of suicidal tendencies     Hyperlipidemia     Lumbar pain with radiation down both legs     Lumbar stenosis     Non-union of fracture LEFT ANKLE    Osteoarthritis     Pneumonia     Sleep disturbance     Spider bite     on great toe on left    Spondylosis     Tenosynovitis of ankle LEFT      Past Surgical History:   Procedure Laterality Date    ADENOIDECTOMY      HC INJECT OTHER PERPHRL NERV Bilateral 5/9/2019    INJECTION FACET LUMBAR L4-S1 performed by Haim Santos MD at [facility]  8/8/13    Duramorph, Celestone 9mg    NERVE BLOCK  4-24-14    duramorph epidural steroid block, celestone 9mg, morphine 1.25mg    NERVE BLOCK  12/19/2014    DURAMORPH - CELESTONE 9 MG- DURAMORPH  1.5MG    NERVE BLOCK  7/23/15    duramorph 1.5mg celestone 9mg    NERVE BLOCK  11/19/15    Tens-Empi Select    NERVE BLOCK  3-10-16    duramorph epidural steroid block duramorph 1.5 mg decadron 7.5 mg    NERVE BLOCK  12/12/2016    duramorph caudal epidural,decadron 7.5 mg, duramorph 1.5 mg    NERVE BLOCK  10/23/2017    duramorph, duramorph 1.5, decadron 10mg    NERVE BLOCK Bilateral 05/02/2019    bilat  medial branch nerve block   marcaine 1/4%    OTHER SURGICAL HISTORY Bilateral 05/09/2019    facet lumbar injection L4-S1    OTHER SURGICAL HISTORY Left 11/11/2019    Nerve Radiofrequency Ablation Left Lumbar Medial Branch L2-L5    PAIN MANAGEMENT PROCEDURE Right 6/18/2020    NERVE RADIOFREQUENCY ABLATION RIGHT SIDED MEDIAL BRANCH   L2-L5 performed by Shawnie Canavan, MD at [address] Left 7/6/2020    LEFT MEDIAL BRANCH L2-L5 NERVE RADIOFREQUENCY ABLATION performed by Shawnie Canavan, MD at [address] [place] [address] Right 10/21/2019    NERVE RADIOFREQUENCY ABLATION RIGHT LUMBAR MEDIAL BRANCH L2-L5 performed by [facility] Miki Escobedo MD at [facility] Left 11/11/2019    NERVE RADIOFREQUENCY ABLATION LEFT LUMBAR [facility] L2-L5 performed by Kristy Pond MD at [place] [address] ENDOSCOPY      x1    URETHRAL STRICTURE DILATATION       Social History     Socioeconomic History    Marital status:      Spouse name: None    Number of children: None    Years of education: None    Highest education level: None   Occupational History    None   Social Needs    Financial resource strain: None    Food insecurity     Worry: None     Inability: None    Transportation needs     Medical: None     Non-medical: None   Tobacco Use    Smoking status: Former Smoker     Packs/day: 1.00     Years: 22.00     Pack years: 22.00     Types: Cigarettes    Smokeless tobacco: Never Used    Tobacco comment: 1/2 to 1 ppd   Substance and Sexual Activity    Alcohol use: No     Alcohol/week: 0.0 standard drinks    Drug use: No    Sexual activity: None   Lifestyle    Physical activity     Days per week: None     Minutes per session: None    Stress: None   Relationships    Social connections     Talks on phone: None     Gets together: None     Attends Muslim service: None     Active member of club or organization: None     Attends meetings of clubs or organizations: None     Relationship status: None    Intimate partner violence     Fear of current or ex partner: None     Emotionally abused: None     Physically abused: None     Forced sexual activity: None   Other Topics Concern    None   Social History Narrative    None     Family History   Problem Relation Age of Onset    Breast Cancer Mother     Liver Cancer Mother     Other Father         brain cysts     Allergies   Allergen Reactions    Celebrex [Celecoxib] Shortness Of Breath     CHEST PAIN    Penicillins Hives and Nausea And Vomiting     Celebrex [celecoxib] and Penicillins   Vitals:    12/07/20 0825   BP: 131/81   Pulse: 114   Temp: 97.6 °F (36.4 °C)   SpO2: 97%     Current Outpatient Medications   Medication Sig Dispense Refill    [START ON 12/20/2020] traMADol (ULTRAM) 50 MG tablet Take 1 tablet by mouth every 8 hours as needed for Pain for up to 30 days. Fill 1/29/20 90 tablet 1    gabapentin (NEURONTIN) 300 MG capsule Take 1 capsule by mouth every 8 hours for 30 days. 90 capsule 1    cyclobenzaprine (FLEXERIL) 10 MG tablet Take 1 tablet by mouth daily as needed for Muscle spasms 90 tablet 1    traZODone (DESYREL) 100 MG tablet Take 100 mg by mouth nightly      sertraline (ZOLOFT) 100 MG tablet       risperiDONE (RISPERDAL) 2 MG tablet Take 3 mg by mouth 2 times daily       atorvastatin (LIPITOR) 40 MG tablet TAKE ONE TABLET BY MOUTH ONE TIME A DAY (Patient taking differently: Take 40 mg by mouth nightly ) 90 tablet 3    docusate sodium (COLACE) 100 MG capsule Take 1 capsule by mouth daily as needed for Constipation 60 capsule 3    lidocaine (LMX) 4 % cream Apply topically as needed for Pain Apply topically as needed.  acetaminophen (TYLENOL) 325 MG tablet Take 650 mg by mouth as needed for Pain. No current facility-administered medications for this visit. Review of Systems   Constitutional: Negative. Negative for fever. HENT: Negative. Eyes: Negative. Respiratory: Negative. Cardiovascular: Negative. Gastrointestinal: Negative. Endocrine: Negative. Genitourinary: Negative. Musculoskeletal: Positive for back pain. Skin: Negative. Allergic/Immunologic: Negative. Neurological: Negative. Hematological: Negative. Psychiatric/Behavioral: Negative. Objective:  General Appearance:  Well-appearing and in no acute distress. Vital signs: (most recent): Height 5' 10\" (1.778 m), weight 255 lb (115.7 kg). Vital signs are normal.  No fever. Output: Producing urine and producing stool.     HEENT: Normal HEENT exam.    Lungs:  Normal effort and normal respiratory rate. Breath sounds clear to auscultation. He is not in respiratory distress. Heart: Normal rate. Extremities: Normal range of motion. There is no deformity. Neurological: Patient is alert and oriented to person, place and time. Patient has normal coordination. Pupils:  Pupils are equal, round, and reactive to light. Pupils are equal.   Skin:  Warm and dry. No rash or cyanosis. Assessment & Plan           78-year-old man with history of chronic lower back pain onset of symptom many years ago  Pain located in the lumbar area across midline describes it as aching nagging stiffness in the lower back left side more than right  No radiation of pain in leg  No associated numbness or paresthesia  Pain aggravated with excessive activity  Patient is diagnosed with lumbar spondylosis and lumbar facet mediated pain  He had radiofrequency ablation in June 2020 with 70 to 80% relief  His pain is currently stable  He is currently on low-dose tramadol along with Flexeril and gabapentin  Finds the medication helpful  Report no side effect from the medication  No history of illicit substance use  1. Chronic use of opiate drug for therapeutic purpose    2. Chronic bilateral low back pain without sciatica    3. Lumbosacral spondylosis without myelopathy        Automated prescription report reviewed  Stable on current regimen  No side effect  Refill ordered  Safe use of medication discussed    Consider for repeat radiofrequency ablation in future if needed    No orders of the defined types were placed in this encounter. Orders Placed This Encounter   Medications    traMADol (ULTRAM) 50 MG tablet     Sig: Take 1 tablet by mouth every 8 hours as needed for Pain for up to 30 days. Fill 1/29/20     Dispense:  90 tablet     Refill:  1     Reduce doses taken as pain becomes manageable    gabapentin (NEURONTIN) 300 MG capsule     Sig: Take 1 capsule by mouth every 8 hours for 30 days. Dispense:  90 capsule     Refill:  1    cyclobenzaprine (FLEXERIL) 10 MG tablet     Sig: Take 1 tablet by mouth daily as needed for Muscle spasms     Dispense:  90 tablet     Refill:  1        Controlled Substance Monitoring:    Acute and Chronic Pain Monitoring:   RX Monitoring 12/9/2020   Attestation -   Periodic Controlled Substance Monitoring Possible medication side effects, risk of tolerance/dependence & alternative treatments discussed. ;No signs of potential drug abuse or diversion identified. ;Assessed functional status. ;Obtaining appropriate analgesic effect of treatment. Chronic Pain > 50 MEDD Obtained or confirmed \"Consent for Opioid Use\" on file.    Chronic Pain > 80 MEDD -         Electronically signed by Josias Castañeda MD on 12/9/2020 at 3:30 PM

## 2020-12-09 ENCOUNTER — OFFICE VISIT (OUTPATIENT)
Dept: PAIN MANAGEMENT | Age: 45
End: 2020-12-09
Payer: COMMERCIAL

## 2020-12-09 VITALS
HEART RATE: 114 BPM | WEIGHT: 255 LBS | HEIGHT: 70 IN | DIASTOLIC BLOOD PRESSURE: 81 MMHG | BODY MASS INDEX: 36.51 KG/M2 | SYSTOLIC BLOOD PRESSURE: 131 MMHG | TEMPERATURE: 97.6 F | OXYGEN SATURATION: 97 %

## 2020-12-09 PROCEDURE — 99213 OFFICE O/P EST LOW 20 MIN: CPT | Performed by: ANESTHESIOLOGY

## 2020-12-09 RX ORDER — TRAMADOL HYDROCHLORIDE 50 MG/1
50 TABLET ORAL EVERY 8 HOURS PRN
Qty: 90 TABLET | Refills: 1 | Status: SHIPPED | OUTPATIENT
Start: 2020-12-20 | End: 2021-02-17 | Stop reason: SDUPTHER

## 2020-12-09 RX ORDER — CYCLOBENZAPRINE HCL 10 MG
10 TABLET ORAL DAILY PRN
Qty: 90 TABLET | Refills: 1 | Status: SHIPPED | OUTPATIENT
Start: 2020-12-09 | End: 2021-05-10 | Stop reason: SDUPTHER

## 2020-12-09 RX ORDER — GABAPENTIN 300 MG/1
300 CAPSULE ORAL EVERY 8 HOURS SCHEDULED
Qty: 90 CAPSULE | Refills: 1 | Status: SHIPPED | OUTPATIENT
Start: 2020-12-09 | End: 2021-02-17 | Stop reason: SDUPTHER

## 2021-01-27 ENCOUNTER — OFFICE VISIT (OUTPATIENT)
Dept: PAIN MANAGEMENT | Age: 46
End: 2021-01-27
Payer: COMMERCIAL

## 2021-01-27 VITALS
WEIGHT: 250 LBS | TEMPERATURE: 96.7 F | HEART RATE: 117 BPM | OXYGEN SATURATION: 96 % | BODY MASS INDEX: 35.79 KG/M2 | HEIGHT: 70 IN | SYSTOLIC BLOOD PRESSURE: 114 MMHG | DIASTOLIC BLOOD PRESSURE: 73 MMHG

## 2021-01-27 DIAGNOSIS — Z79.891 CHRONIC USE OF OPIATE DRUG FOR THERAPEUTIC PURPOSE: ICD-10-CM

## 2021-01-27 DIAGNOSIS — M54.50 CHRONIC BILATERAL LOW BACK PAIN WITHOUT SCIATICA: Primary | ICD-10-CM

## 2021-01-27 DIAGNOSIS — M47.817 LUMBOSACRAL SPONDYLOSIS WITHOUT MYELOPATHY: Chronic | ICD-10-CM

## 2021-01-27 DIAGNOSIS — G89.29 CHRONIC BILATERAL LOW BACK PAIN WITHOUT SCIATICA: Primary | ICD-10-CM

## 2021-01-27 PROCEDURE — 99214 OFFICE O/P EST MOD 30 MIN: CPT | Performed by: ANESTHESIOLOGY

## 2021-01-27 ASSESSMENT — ENCOUNTER SYMPTOMS
BACK PAIN: 1
RESPIRATORY NEGATIVE: 1

## 2021-01-27 NOTE — PROGRESS NOTES
The patient is a 50 y. o. Non-/non  male. Chief Complaint   Patient presents with    Back Pain         HPI  47-year-old man with history of chronic lower back pain  Located in the lumbar area across midline affect both side  No radiation of pain in leg  Described the pain as aching nagging stiffness in the lower back  Onset of symptoms several years ago symptoms of progressively worsened  Is diagnosed with lumbar facet mediated pain  Has tried conservative measures  Currently taking tramadol for pain  Recently started on trazodone by primary care physician for sleep    In past had radiofrequency ablation with excellent outcome  Last procedure provided more than 80% relief  Last procedure was more than 6 months ago  Pain is now back to the baseline  Requesting repeat procedure    Patient is here to follow up on chronic back pain and to discuss scheduling procedure. Pt rates pain today as 2/10. Pain is constant aching, burning, sharp, stabbing, radiating pain down bilateral legs. Aggravating factors include:walking, sitting, standing,bending, twisting. Alleviating factors include:stretching, hot baths, laying down.      Past Medical History:   Diagnosis Date    Back pain, chronic     Bipolar 2 disorder (HCC)     Bronchitis     Degenerative disc disease     Depression     Displacement of lumbar intervertebral disc without myelopathy 5/7/2013    Fibula fracture LEFT    History of gastric ulcer     History of suicidal tendencies     Hyperlipidemia     Lumbar pain with radiation down both legs     Lumbar stenosis     Non-union of fracture LEFT ANKLE    Osteoarthritis     Pneumonia     Sleep disturbance     Spider bite     on great toe on left    Spondylosis     Tenosynovitis of ankle LEFT      Past Surgical History:   Procedure Laterality Date    ADENOIDECTOMY      [facility] [employer] INJECT OTHER PERPHRL NERV Bilateral 5/9/2019  Food insecurity     Worry: None     Inability: None    Transportation needs     Medical: None     Non-medical: None   Tobacco Use    Smoking status: Former Smoker     Packs/day: 1.00     Years: 22.00     Pack years: 22.00     Types: Cigarettes    Smokeless tobacco: Never Used    Tobacco comment: 1/2 to 1 ppd   Substance and Sexual Activity    Alcohol use: No     Alcohol/week: 0.0 standard drinks    Drug use: No    Sexual activity: None   Lifestyle    Physical activity     Days per week: None     Minutes per session: None    Stress: None   Relationships    Social connections     Talks on phone: None     Gets together: None     Attends Voodoo service: None     Active member of club or organization: None     Attends meetings of clubs or organizations: None     Relationship status: None    Intimate partner violence     Fear of current or ex partner: None     Emotionally abused: None     Physically abused: None     Forced sexual activity: None   Other Topics Concern    None   Social History Narrative    None     Family History   Problem Relation Age of Onset    Breast Cancer Mother     Liver Cancer Mother     Other Father         brain cysts     Allergies   Allergen Reactions    Celebrex [Celecoxib] Shortness Of Breath     CHEST PAIN    Penicillins Hives and Nausea And Vomiting     Celebrex [celecoxib] and Penicillins   Vitals:    01/27/21 1548   BP: 114/73   Pulse:    Temp:    SpO2:      Current Outpatient Medications   Medication Sig Dispense Refill    traMADol (ULTRAM) 50 MG tablet Take 1 tablet by mouth every 8 hours as needed for Pain for up to 30 days. Fill 1/29/20 90 tablet 1    gabapentin (NEURONTIN) 300 MG capsule Take 1 capsule by mouth every 8 hours for 30 days.  90 capsule 1    cyclobenzaprine (FLEXERIL) 10 MG tablet Take 1 tablet by mouth daily as needed for Muscle spasms 90 tablet 1    traZODone (DESYREL) 100 MG tablet Take 100 mg by mouth nightly Responded well to radiofrequency ablation of lumbar medial branch nerve after successful diagnostic block in past  DOS procedures more than 6 months ago  Pain is now back to the baseline  We will recommend for repeat lumbar radiofrequency ablation    Automated prescription report reviewed  Surface of medication discussed  Refill for tramadol ordered    Review of medication shows several medication with serotonin reuptake inhibition affected that can increase the risk of serotonin syndrome and seizures  Advised patient to discontinue trazodone and discuss other sleep alternative medication with primary care physician  Patient voiced understanding    1. Chronic bilateral low back pain without sciatica    2. Lumbosacral spondylosis without myelopathy    3. Chronic use of opiate drug for therapeutic purpose        Orders Placed This Encounter   Procedures    FACET JOINT L/S SINGLE    Destruction by neurolytic agent      No orders of the defined types were placed in this encounter. Controlled Substance Monitoring:    Acute and Chronic Pain Monitoring:   RX Monitoring 1/27/2021   Attestation -   Periodic Controlled Substance Monitoring Possible medication side effects, risk of tolerance/dependence & alternative treatments discussed. ;No signs of potential drug abuse or diversion identified. ;Assessed functional status. Chronic Pain > 50 MEDD Obtained or confirmed \"Consent for Opioid Use\" on file.    Chronic Pain > 80 MEDD -           Electronically signed by Amaury Crandall MD on 1/27/2021 at 4:02 PM

## 2021-02-09 ENCOUNTER — TELEPHONE (OUTPATIENT)
Dept: PAIN MANAGEMENT | Age: 46
End: 2021-02-09

## 2021-02-09 NOTE — TELEPHONE ENCOUNTER
Alli Clark from [employer] called and said that the patient is approved for his procedure.  Approval code [id number]

## 2021-02-15 ENCOUNTER — APPOINTMENT (OUTPATIENT)
Dept: GENERAL RADIOLOGY | Age: 46
End: 2021-02-15
Attending: ANESTHESIOLOGY
Payer: COMMERCIAL

## 2021-02-15 ENCOUNTER — HOSPITAL ENCOUNTER (OUTPATIENT)
Age: 46
Setting detail: OUTPATIENT SURGERY
Discharge: HOME | End: 2021-02-15
Attending: ANESTHESIOLOGY | Admitting: ANESTHESIOLOGY
Payer: COMMERCIAL

## 2021-02-15 VITALS
WEIGHT: 250 LBS | DIASTOLIC BLOOD PRESSURE: 74 MMHG | OXYGEN SATURATION: 95 % | BODY MASS INDEX: 35.79 KG/M2 | HEART RATE: 101 BPM | RESPIRATION RATE: 16 BRPM | SYSTOLIC BLOOD PRESSURE: 148 MMHG | HEIGHT: 70 IN | TEMPERATURE: 97.9 F

## 2021-02-15 PROCEDURE — 7100000010 HC PHASE II RECOVERY - FIRST 15 MIN: Performed by: ANESTHESIOLOGY

## 2021-02-15 PROCEDURE — 99152 MOD SED SAME PHYS/QHP 5/>YRS: CPT | Performed by: ANESTHESIOLOGY

## 2021-02-15 PROCEDURE — 2500000003 HC RX 250 WO HCPCS: Performed by: ANESTHESIOLOGY

## 2021-02-15 PROCEDURE — 64635 DESTROY LUMB/SAC FACET JNT: CPT | Performed by: ANESTHESIOLOGY

## 2021-02-15 PROCEDURE — 2709999900 HC NON-CHARGEABLE SUPPLY: Performed by: ANESTHESIOLOGY

## 2021-02-15 PROCEDURE — 3209999900 FLUORO FOR SURGICAL PROCEDURES

## 2021-02-15 PROCEDURE — 7100000011 HC PHASE II RECOVERY - ADDTL 15 MIN: Performed by: ANESTHESIOLOGY

## 2021-02-15 PROCEDURE — 3600000055 HC PAIN LEVEL 3 ADDL 15 MIN: Performed by: ANESTHESIOLOGY

## 2021-02-15 PROCEDURE — 2580000003 HC RX 258: Performed by: ANESTHESIOLOGY

## 2021-02-15 PROCEDURE — 6360000002 HC RX W HCPCS: Performed by: ANESTHESIOLOGY

## 2021-02-15 PROCEDURE — 64636 DESTROY L/S FACET JNT ADDL: CPT | Performed by: ANESTHESIOLOGY

## 2021-02-15 PROCEDURE — 3600000054 HC PAIN LEVEL 3 BASE: Performed by: ANESTHESIOLOGY

## 2021-02-15 RX ORDER — LIDOCAINE HYDROCHLORIDE 10 MG/ML
INJECTION, SOLUTION INFILTRATION; PERINEURAL PRN
Status: DISCONTINUED | OUTPATIENT
Start: 2021-02-15 | End: 2021-02-15 | Stop reason: ALTCHOICE

## 2021-02-15 RX ORDER — FENTANYL CITRATE 50 UG/ML
INJECTION, SOLUTION INTRAMUSCULAR; INTRAVENOUS PRN
Status: DISCONTINUED | OUTPATIENT
Start: 2021-02-15 | End: 2021-02-15 | Stop reason: ALTCHOICE

## 2021-02-15 RX ORDER — LIDOCAINE HYDROCHLORIDE 40 MG/ML
INJECTION, SOLUTION RETROBULBAR; TOPICAL PRN
Status: DISCONTINUED | OUTPATIENT
Start: 2021-02-15 | End: 2021-02-15 | Stop reason: ALTCHOICE

## 2021-02-15 RX ORDER — SODIUM CHLORIDE 0.9 % (FLUSH) 0.9 %
10 SYRINGE (ML) INJECTION PRN
Status: DISCONTINUED | OUTPATIENT
Start: 2021-02-15 | End: 2021-02-15 | Stop reason: HOSPADM

## 2021-02-15 RX ORDER — MIRTAZAPINE 15 MG/1
45 TABLET, FILM COATED ORAL NIGHTLY
COMMUNITY
End: 2021-05-10

## 2021-02-15 RX ORDER — SODIUM CHLORIDE 0.9 % (FLUSH) 0.9 %
10 SYRINGE (ML) INJECTION EVERY 12 HOURS SCHEDULED
Status: DISCONTINUED | OUTPATIENT
Start: 2021-02-15 | End: 2021-02-15 | Stop reason: HOSPADM

## 2021-02-15 RX ORDER — MIDAZOLAM HYDROCHLORIDE 1 MG/ML
INJECTION INTRAMUSCULAR; INTRAVENOUS PRN
Status: DISCONTINUED | OUTPATIENT
Start: 2021-02-15 | End: 2021-02-15 | Stop reason: ALTCHOICE

## 2021-02-15 RX ADMIN — SODIUM CHLORIDE, PRESERVATIVE FREE 10 ML: 5 INJECTION INTRAVENOUS at 13:44

## 2021-02-15 ASSESSMENT — PAIN DESCRIPTION - LOCATION: LOCATION: BACK

## 2021-02-15 ASSESSMENT — PAIN DESCRIPTION - ORIENTATION: ORIENTATION: LOWER

## 2021-02-15 ASSESSMENT — PAIN SCALES - GENERAL
PAINLEVEL_OUTOF10: 2
PAINLEVEL_OUTOF10: 0
PAINLEVEL_OUTOF10: 2
PAINLEVEL_OUTOF10: 0

## 2021-02-15 ASSESSMENT — PAIN DESCRIPTION - DESCRIPTORS: DESCRIPTORS: ACHING

## 2021-02-15 NOTE — OP NOTE
Operative Note      Patient: Suzanne Eubanks  YOB: 1975  MRN: [id number]    Date of Procedure: 2/15/2021    Pre-Op Diagnosis: DX LUMBOSACRAL SPONDYLOSIS W/O MYELOPATHY  CHRNOIC CHELA LOW BACK PAIN  W/O  SCIATICA    Post-Op Diagnosis: Same       Procedure(s):  RIGHT MEDIAL BRANCH L3-S1 NERVE RADIOFREQUENCY ABLATION    Surgeon(s):  Estela Lopez MD    Assistant:   * No surgical staff found *    Anesthesia: IV Sedation    Estimated Blood Loss (mL): Minimal    Complications: None    Specimens:   * No specimens in log *    Implants:  * No implants in log *      Drains: * No LDAs found *    Findings: N/A    Detailed Description of Procedure:   Preoperative Diagnosis: Lumbar spondylosis w/o myelopathy, chronic low back pain  Postoperative Diagnosis: Lumbar spondylosis w/o myelopathy, chronic low back pain  SEDATION: SEE SEDATION NOTE  BLOOD LOSS: NONE    Procedure Performed:  :Radiofrequency ablation of median branches at the Transverse processes of L3 / L4 / L5 and S1 on Right under fluoroscopic guidance with IV sedation. Procedure:    After starting an IV, the patient was taken to procedure room. The patient was placed in prone position and skin over the back was prepped and draped in sterile manner. Standard monitors were connected and vitals were monitored during the case and they remained stable during the procedure. A meaningful communication was kept up with the patient throughout the procedure. Then using fluoroscopy the junction of the transverse process of the target vertebra with the superior process of the facet joint was observed and the view was optimized. The skin and deep tissues were infiltrated with 2 ml of  1 % lidocaine. The RF canula with the 10 mm active tip was introduced through the skin wheal under fluoroscopy guidance such that the tip of the needle lies in the groove of the transverse process with the superior processes of the facet joint.   Then a lateral and AP view of the lumbar spine was obtained to make sure the tip of needle is not in the neural foramen. Then electric impedence was checked to make sure it is acceptable. Then a sensory stimulus was applied at 50 Hz up to 0.5 volt and concordant pain symptoms were reproduced. Then a motor stimulus was applied at 2 Hz up to 2 volts or 3 x times the sensory stimulus and no motor stimulation was seen in lower extremities. Some multifidus stimulus was seen. Then after negative aspiration 1 ml of 4% lidocaine was injected through the needle at each level. The radiofrequency lesion was done at 85 degrees centigrade for 110 seconds. For L5 median branch block the junction of the ala of  the sacrum with the superior articular process of the facet joint was taken as a reference point. For the other levels median branch nerves the junction of the transverse process with the superolateral possible facet joint was taken as a reference point    Patient's vital signs and neurological status remained stable throughout the procedure and post procedural period. The patient tolerated the procedure well and was discharged home in stable condition. Electronically signed by Estela Lopez MD on 2/15/2021 at 3:01 PM  SEDATION NOTE:    ASA CLASSIFICATION  2  MP   CLASSIFICATION  2    · Moderate intravenous conscious sedation was supervised by Dr. Surekha Caruso  . The patient was independently monitored by a Registered Nurse assigned to the Procedure Room  . Monitoring included automated blood pressure, continuous EKG, Capnography and continuous pulse oximetry. . The detailed Conscious Record is permanently stored in the [place] [id number].      The following is the conscious sedation record;  Start Time:  1440  End times:  1458  Duration:  18 MINUTES  MEDS GIVEN 2 MG VERSED  MCG FENTANYL

## 2021-02-15 NOTE — H&P
Provider, MD   risperiDONE (RISPERDAL) 2 MG tablet Take 3 mg by mouth 2 times daily    Yes Historical Provider, MD   atorvastatin (LIPITOR) 40 MG tablet TAKE ONE TABLET BY MOUTH ONE TIME A DAY  Patient taking differently: Take 40 mg by mouth nightly  6/26/18  Yes Ban Guevara MD   docusate sodium (COLACE) 100 MG capsule Take 1 capsule by mouth daily as needed for Constipation 10/19/17  Yes Arabella Medel MD   acetaminophen (TYLENOL) 325 MG tablet Take 650 mg by mouth as needed for Pain. Yes Historical Provider, MD   lidocaine (LMX) 4 % cream Apply topically as needed for Pain Apply topically as needed. Historical Provider, MD       This is a 50 y.o. male who is pleasant, cooperative, alert and oriented x 3, in no acute distress. Obese. Heart: Mild tachycardia.  BPM. Regular rhythm without murmur, gallop, or rub. Lungs: Normal respiratory effort, unlabored, and clear to auscultation without wheezes or rales bilaterally. Abdomen: Rotund. Soft, nontender, and active bowel sounds. Pedal pulses: 2+ bilaterally. Labs:  No results for input(s): HGB, HCT, WBC, MCV, PLT, NA, K, CL, CO2, BUN, CREATININE, GLUCOSE, INR, PROTIME, APTT, AST, ALT, LABALBU, HCG in the last 720 hours. No results for input(s): COVID19 in the last 720 hours. Ruthann WEN, FNP-BC  Electronically signed 2/15/2021 at 1:45 PM      Amaury Crandall MD   Physician   Specialty:  Pain Management   Progress Notes   Signed   Encounter Date:  1/27/2021         Related encounter: Office Visit from 1/27/2021 in [facility]         Signed        Expand AllCollapse All     Show:Clear all  [x]Manual[x]Template[]Copied    Added by:  [x]Stephane Bowen MD    []Lori for details   The patient is a 50 y. o. Non-/non  male.   Chief Complaint   Patient presents with    Back Pain            HPI  40-year-old man with history of chronic lower back pain  Located in the lumbar area across midline affect both side  No radiation of pain in leg  Described the pain as aching nagging stiffness in the lower back  Onset of symptoms several years ago symptoms of progressively worsened  Is diagnosed with lumbar facet mediated pain  Has tried conservative measures  Currently taking tramadol for pain  Recently started on trazodone by primary care physician for sleep     In past had radiofrequency ablation with excellent outcome  Last procedure provided more than 80% relief  Last procedure was more than 6 months ago  Pain is now back to the baseline  Requesting repeat procedure     Patient is here to follow up on chronic back pain and to discuss scheduling procedure. Pt rates pain today as 2/10. Pain is constant aching, burning, sharp, stabbing, radiating pain down bilateral legs. Aggravating factors include:walking, sitting, standing,bending, twisting. Alleviating factors include:stretching, hot baths, laying down.       Past Medical History        Past Medical History:   Diagnosis Date    Back pain, chronic      Bipolar 2 disorder (HCC)      Bronchitis      Degenerative disc disease      Depression      Displacement of lumbar intervertebral disc without myelopathy 5/7/2013    Fibula fracture LEFT    History of gastric ulcer      History of suicidal tendencies      Hyperlipidemia      Lumbar pain with radiation down both legs      Lumbar stenosis      Non-union of fracture LEFT ANKLE    Osteoarthritis      Pneumonia      Sleep disturbance      Spider bite       on great toe on left    Spondylosis      Tenosynovitis of ankle LEFT         Past Surgical History         Past Surgical History:   Procedure Laterality Date    ADENOIDECTOMY        [facility] INJECT OTHER PERPHRL NERV Bilateral 5/9/2019     INJECTION FACET LUMBAR L4-S1 performed [facility]ell [facility] at [facility]   8/8/13     Duramorph, Celestone 9mg    NERVE BLOCK   4-24-14     duramorph epidural steroid block, celestone 9mg, morphine 1.25mg    NERVE BLOCK   12/19/2014     DURAMORPH - CELESTONE 9 MG- DURAMORPH  1.5MG    NERVE BLOCK   7/23/15     duramorph 1.5mg celestone 9mg    NERVE BLOCK   11/19/15     Tens-Empi Select    NERVE BLOCK   3-10-16     duramorph epidural steroid block duramorph 1.5 mg decadron 7.5 mg    NERVE BLOCK   12/12/2016     duramorph caudal epidural,decadron 7.5 mg, duramorph 1.5 mg    NERVE BLOCK   10/23/2017     duramorph, duramorph 1.5, decadron 10mg    NERVE BLOCK Bilateral 05/02/2019     bilat  medial branch nerve block   marcaine 1/4%    OTHER SURGICAL HISTORY Bilateral 05/09/2019     facet lumbar injection L4-S1    OTHER SURGICAL HISTORY Left 11/11/2019     Nerve Radiofrequency Ablation Left Lumbar Medial Branch L2-L5    PAIN MANAGEMENT PROCEDURE Right 6/18/2020     NERVE RADIOFREQUENCY ABLATION RIGHT SIDED MEDIAL BRANCH   L2-L5 performed by Magda Hanna MD at [address] Left 7/6/2020     LEFT MEDIAL BRANCH L2-L5 NERVE RADIOFREQUENCY ABLATION performed by Magda Hanna MD at [address] [place] [address] Right 10/21/2019     NERVE RADIOFREQUENCY ABLATION RIGHT LUMBAR MEDIAL BRANCH L2-L5 performed by Magda Hanna MD at [address] [place] [address] Left 11/11/2019     NERVE RADIOFREQUENCY ABLATION LEFT LUMBAR [facility] L2-L5 performed by Magda Hanna MD at [facility] ENDOSCOPY         x1    URETHRAL STRICTURE DILATATION             Social History   Social History            Socioeconomic History    Marital status:        Spouse name: None    Number of children: None    Years of education: None    Highest education level: None   Occupational History    None   Social Needs    Financial resource strain: None    Food insecurity       Worry: None       Inability: None    Transportation needs       Medical: None       Non-medical: None Tobacco Use    Smoking status: Former Smoker       Packs/day: 1.00       Years: 22.00       Pack years: 22.00       Types: Cigarettes    Smokeless tobacco: Never Used    Tobacco comment: 1/2 to 1 ppd   Substance and Sexual Activity    Alcohol use: No       Alcohol/week: 0.0 standard drinks    Drug use: No    Sexual activity: None   Lifestyle    Physical activity       Days per week: None       Minutes per session: None    Stress: None   Relationships    Social connections       Talks on phone: None       Gets together: None       Attends Pentecostal service: None       Active member of club or organization: None       Attends meetings of clubs or organizations: None       Relationship status: None    Intimate partner violence       Fear of current or ex partner: None       Emotionally abused: None       Physically abused: None       Forced sexual activity: None   Other Topics Concern    None   Social History Narrative    None         Family History         Family History   Problem Relation Age of Onset    Breast Cancer Mother      Liver Cancer Mother      Other Father           brain cysts               Allergies   Allergen Reactions    Celebrex [Celecoxib] Shortness Of Breath       CHEST PAIN    Penicillins Hives and Nausea And Vomiting      Celebrex [celecoxib] and Penicillins       Vitals:     01/27/21 1548   BP: 114/73   Pulse:     Temp:     SpO2:        Current Facility-Administered Medications          Current Outpatient Medications   Medication Sig Dispense Refill    traMADol (ULTRAM) 50 MG tablet Take 1 tablet by mouth every 8 hours as needed for Pain for up to 30 days. Fill 1/29/20 90 tablet 1    gabapentin (NEURONTIN) 300 MG capsule Take 1 capsule by mouth every 8 hours for 30 days.  90 capsule 1    cyclobenzaprine (FLEXERIL) 10 MG tablet Take 1 tablet by mouth daily as needed for Muscle spasms 90 tablet 1    traZODone (DESYREL) 100 MG tablet Take 100 mg by mouth nightly        sertraline (ZOLOFT) 100 MG tablet          risperiDONE (RISPERDAL) 2 MG tablet Take 3 mg by mouth 2 times daily         atorvastatin (LIPITOR) 40 MG tablet TAKE ONE TABLET BY MOUTH ONE TIME A DAY (Patient taking differently: Take 40 mg by mouth nightly ) 90 tablet 3    docusate sodium (COLACE) 100 MG capsule Take 1 capsule by mouth daily as needed for Constipation 60 capsule 3    lidocaine (LMX) 4 % cream Apply topically as needed for Pain Apply topically as needed.  acetaminophen (TYLENOL) 325 MG tablet Take 650 mg by mouth as needed for Pain. No current facility-administered medications for this visit. Review of Systems   Constitutional: Negative. Negative for fever. Respiratory: Negative. Musculoskeletal: Positive for arthralgias, back pain and myalgias. Neurological: Negative. Objective:  General Appearance:  Uncomfortable and in pain. Vital signs: (most recent): Blood pressure 114/73, pulse 117, temperature 96.7 °F (35.9 °C), temperature source Infrared, height 5' 10\" (1.778 m), weight 250 lb (113.4 kg), SpO2 96 %. Vital signs are normal.  No fever. Output: Producing urine and producing stool. HEENT: Normal HEENT exam.    Lungs:  Normal effort and normal respiratory rate. Breath sounds clear to auscultation. He is not in respiratory distress. Heart: Normal rate. Extremities: Normal range of motion. There is no deformity. Neurological: Patient is alert and oriented to person, place and time. Patient has normal coordination. Pupils:  Pupils are equal, round, and reactive to light. Pupils are equal.   Skin:  Warm and dry. No rash or cyanosis.       Assessment & Plan   Chronic axial lumbar spinal pain onset many years ago progressively worsened affecting daily life activity refractory to conservative measures including therapy and medication management  Responded well to radiofrequency ablation of lumbar medial branch nerve after successful

## 2021-02-17 DIAGNOSIS — G89.29 CHRONIC BILATERAL LOW BACK PAIN WITHOUT SCIATICA: ICD-10-CM

## 2021-02-17 DIAGNOSIS — M54.50 CHRONIC BILATERAL LOW BACK PAIN WITHOUT SCIATICA: ICD-10-CM

## 2021-02-17 DIAGNOSIS — M47.817 LUMBOSACRAL SPONDYLOSIS WITHOUT MYELOPATHY: Chronic | ICD-10-CM

## 2021-02-17 RX ORDER — TRAMADOL HYDROCHLORIDE 50 MG/1
50 TABLET ORAL EVERY 8 HOURS PRN
Qty: 90 TABLET | Refills: 1 | Status: SHIPPED | OUTPATIENT
Start: 2021-02-17 | End: 2021-04-20 | Stop reason: SDUPTHER

## 2021-02-17 RX ORDER — GABAPENTIN 300 MG/1
300 CAPSULE ORAL EVERY 8 HOURS SCHEDULED
Qty: 90 CAPSULE | Refills: 1 | Status: SHIPPED | OUTPATIENT
Start: 2021-02-17 | End: 2021-04-20 | Stop reason: SDUPTHER

## 2021-02-17 NOTE — TELEPHONE ENCOUNTER
Dee Santillan is requesting a refill on the following medications:   Requested Prescriptions     Pending Prescriptions Disp Refills    traMADol (ULTRAM) 50 MG tablet 90 tablet 1     Sig: Take 1 tablet by mouth every 8 hours as needed for Pain for up to 30 days. Fill 1/29/20       Last OV 1/27/21. Pt had right lumbar RFA done on 2/15/21    Future Appointments   Date Time Provider Pepe Danae   3/15/2021  2:20 PM Radha Clock, APRN - CNP [id number]       [facility] report sent to Dr. Tamar Whitt through alternative route for review.

## 2021-03-15 ENCOUNTER — OFFICE VISIT (OUTPATIENT)
Dept: PAIN MANAGEMENT | Age: 46
End: 2021-03-15
Payer: COMMERCIAL

## 2021-03-15 VITALS
SYSTOLIC BLOOD PRESSURE: 139 MMHG | HEIGHT: 70 IN | HEART RATE: 104 BPM | DIASTOLIC BLOOD PRESSURE: 89 MMHG | WEIGHT: 246.6 LBS | OXYGEN SATURATION: 97 % | TEMPERATURE: 98 F | BODY MASS INDEX: 35.3 KG/M2

## 2021-03-15 DIAGNOSIS — M79.605 LUMBAR PAIN WITH RADIATION DOWN BOTH LEGS: ICD-10-CM

## 2021-03-15 DIAGNOSIS — Z79.891 CHRONIC USE OF OPIATE DRUG FOR THERAPEUTIC PURPOSE: ICD-10-CM

## 2021-03-15 DIAGNOSIS — M54.50 LUMBAR PAIN WITH RADIATION DOWN BOTH LEGS: ICD-10-CM

## 2021-03-15 DIAGNOSIS — M79.604 LUMBAR PAIN WITH RADIATION DOWN BOTH LEGS: ICD-10-CM

## 2021-03-15 DIAGNOSIS — M51.26 DISPLACEMENT OF LUMBAR INTERVERTEBRAL DISC WITHOUT MYELOPATHY: ICD-10-CM

## 2021-03-15 DIAGNOSIS — M47.817 LUMBOSACRAL SPONDYLOSIS WITHOUT MYELOPATHY: Primary | Chronic | ICD-10-CM

## 2021-03-15 PROCEDURE — 99213 OFFICE O/P EST LOW 20 MIN: CPT | Performed by: NURSE PRACTITIONER

## 2021-03-15 RX ORDER — HYDROXYZINE HYDROCHLORIDE 25 MG/1
TABLET, FILM COATED ORAL
COMMUNITY
Start: 2021-03-09

## 2021-03-15 ASSESSMENT — ENCOUNTER SYMPTOMS
BACK PAIN: 1
COUGH: 0
SHORTNESS OF BREATH: 0
RESPIRATORY NEGATIVE: 1
CONSTIPATION: 0

## 2021-03-15 NOTE — PROGRESS NOTES
today: yes  ER/Hospitalizations/PCP visit related to pain since last visit:no   Any legal problems e.g. DUI etc.:No  Satisfied with current management: Yes    Opioid Contract:8/2/19  Last Urine Dug screen dated:2/22/21    Urine toxicology  Validity testing satisfactory  Positive for tramadol  Consistent with treatment   no illicit substance       No results found for: LABA1C  No results found for: EAG    Past Medical History, Past Surgical History, Social History, Allergies and Medications, reviewed and updated in EPIC as indicated  Controlled Substance Monitoring:    Acute and Chronic Pain Monitoring:   RX Monitoring 3/15/2021   Attestation -   Periodic Controlled Substance Monitoring Possible medication side effects, risk of tolerance/dependence & alternative treatments discussed. ;No signs of potential drug abuse or diversion identified. ;Assessed functional status. ;Obtaining appropriate analgesic effect of treatment. Chronic Pain > 50 MEDD -   Chronic Pain > 80 MEDD -             Periodic Controlled Substance Monitoring: Possible medication side effects, risk of tolerance/dependence & alternative treatments discussed., No signs of potential drug abuse or diversion identified. , Assessed functional status., Obtaining appropriate analgesic effect of treatment.  Alma Meyer, APRN - CNP)      Past Medical History:   Diagnosis Date    Back pain, chronic     Bipolar 2 disorder ([address]     Bronchitis     Degenerative disc disease     Depression     Displacement of lumbar intervertebral disc without myelopathy 5/7/2013    Fibula fracture LEFT    History of gastric ulcer     History of suicidal tendencies     Hyperlipidemia     Lumbar pain with radiation down both legs     Lumbar stenosis     Non-union of fracture LEFT ANKLE    Osteoarthritis     Pneumonia     Sleep disturbance     Spider bite     on great toe on left    Spondylosis     Tenosynovitis of ankle LEFT       Past Surgical History:   Procedure  Celebrex [Celecoxib] Shortness Of Breath     CHEST PAIN    Penicillins Hives and Nausea And Vomiting         Current Outpatient Medications:     hydrOXYzine (ATARAX) 25 MG tablet, , Disp: , Rfl:     traMADol (ULTRAM) 50 MG tablet, Take 1 tablet by mouth every 8 hours as needed for Pain for up to 30 days. Fill 1/29/20, Disp: 90 tablet, Rfl: 1    gabapentin (NEURONTIN) 300 MG capsule, Take 1 capsule by mouth every 8 hours for 30 days. , Disp: 90 capsule, Rfl: 1    mirtazapine (REMERON) 15 MG tablet, Take 45 mg by mouth nightly , Disp: , Rfl:     sertraline (ZOLOFT) 100 MG tablet, , Disp: , Rfl:     risperiDONE (RISPERDAL) 2 MG tablet, Take 3 mg by mouth 2 times daily , Disp: , Rfl:     atorvastatin (LIPITOR) 40 MG tablet, TAKE ONE TABLET BY MOUTH ONE TIME A DAY (Patient taking differently: Take 40 mg by mouth nightly ), Disp: 90 tablet, Rfl: 3    docusate sodium (COLACE) 100 MG capsule, Take 1 capsule by mouth daily as needed for Constipation, Disp: 60 capsule, Rfl: 3    lidocaine (LMX) 4 % cream, Apply topically as needed for Pain Apply topically as needed. , Disp: , Rfl:     acetaminophen (TYLENOL) 325 MG tablet, Take 650 mg by mouth as needed for Pain., Disp: , Rfl:     Family History   Problem Relation Age of Onset    Breast Cancer Mother     Liver Cancer Mother     Other Father         brain cysts       Social History     Socioeconomic History    Marital status:      Spouse name: Not on file    Number of children: Not on file    Years of education: Not on file    Highest education level: Not on file   Occupational History    Not on file   Social Needs    Financial resource strain: Not on file    Food insecurity     Worry: Not on file     Inability: Not on file    Transportation needs     Medical: Not on file     Non-medical: Not on file   Tobacco Use    Smoking status: Former Smoker     Packs/day: 1.00     Years: 22.00     Pack years: 22.00     Types: Cigarettes    Smokeless radiation down both legs             Treatment Plan:  Patient relates current medications are helping the pain. Patient reports taking pain medications as prescribed, denies obtaining medications from different sources and denies use of illegal drugs. Patient denies side effects from medications like nausea, vomiting, constipation or drowsiness. Patient reports current activities of daily living are possible due to medications and would like to continue them. As always, we encourage daily stretching and strengthening exercises, and recommend minimizing use of pain medications unless patient cannot get through daily activities due to pain. Contract requirements met. Continue opioid therapy.  Script not needed  Follow up appointment made for 8 weeks

## 2021-03-22 ENCOUNTER — HOSPITAL ENCOUNTER (OUTPATIENT)
Age: 46
Setting detail: OUTPATIENT SURGERY
Discharge: HOME | End: 2021-03-22
Attending: ANESTHESIOLOGY | Admitting: ANESTHESIOLOGY
Payer: COMMERCIAL

## 2021-03-22 ENCOUNTER — APPOINTMENT (OUTPATIENT)
Dept: GENERAL RADIOLOGY | Age: 46
End: 2021-03-22
Attending: ANESTHESIOLOGY
Payer: COMMERCIAL

## 2021-03-22 VITALS
TEMPERATURE: 97.5 F | RESPIRATION RATE: 18 BRPM | BODY MASS INDEX: 37.8 KG/M2 | HEART RATE: 100 BPM | WEIGHT: 264 LBS | HEIGHT: 70 IN | DIASTOLIC BLOOD PRESSURE: 73 MMHG | OXYGEN SATURATION: 95 % | SYSTOLIC BLOOD PRESSURE: 129 MMHG

## 2021-03-22 PROCEDURE — 6360000002 HC RX W HCPCS: Performed by: ANESTHESIOLOGY

## 2021-03-22 PROCEDURE — 99152 MOD SED SAME PHYS/QHP 5/>YRS: CPT | Performed by: ANESTHESIOLOGY

## 2021-03-22 PROCEDURE — 99153 MOD SED SAME PHYS/QHP EA: CPT | Performed by: ANESTHESIOLOGY

## 2021-03-22 PROCEDURE — 64636 DESTROY L/S FACET JNT ADDL: CPT | Performed by: ANESTHESIOLOGY

## 2021-03-22 PROCEDURE — 7100000011 HC PHASE II RECOVERY - ADDTL 15 MIN: Performed by: ANESTHESIOLOGY

## 2021-03-22 PROCEDURE — 7100000010 HC PHASE II RECOVERY - FIRST 15 MIN: Performed by: ANESTHESIOLOGY

## 2021-03-22 PROCEDURE — 3600000055 HC PAIN LEVEL 3 ADDL 15 MIN: Performed by: ANESTHESIOLOGY

## 2021-03-22 PROCEDURE — 2709999900 HC NON-CHARGEABLE SUPPLY: Performed by: ANESTHESIOLOGY

## 2021-03-22 PROCEDURE — 3600000054 HC PAIN LEVEL 3 BASE: Performed by: ANESTHESIOLOGY

## 2021-03-22 PROCEDURE — 2500000003 HC RX 250 WO HCPCS: Performed by: ANESTHESIOLOGY

## 2021-03-22 PROCEDURE — 64635 DESTROY LUMB/SAC FACET JNT: CPT | Performed by: ANESTHESIOLOGY

## 2021-03-22 PROCEDURE — 3209999900 FLUORO FOR SURGICAL PROCEDURES

## 2021-03-22 RX ORDER — SODIUM CHLORIDE 0.9 % (FLUSH) 0.9 %
10 SYRINGE (ML) INJECTION EVERY 12 HOURS SCHEDULED
Status: DISCONTINUED | OUTPATIENT
Start: 2021-03-22 | End: 2021-03-22 | Stop reason: HOSPADM

## 2021-03-22 RX ORDER — MIDAZOLAM HYDROCHLORIDE 1 MG/ML
INJECTION INTRAMUSCULAR; INTRAVENOUS PRN
Status: DISCONTINUED | OUTPATIENT
Start: 2021-03-22 | End: 2021-03-22 | Stop reason: ALTCHOICE

## 2021-03-22 RX ORDER — LIDOCAINE HYDROCHLORIDE 10 MG/ML
INJECTION, SOLUTION INFILTRATION; PERINEURAL PRN
Status: DISCONTINUED | OUTPATIENT
Start: 2021-03-22 | End: 2021-03-22 | Stop reason: ALTCHOICE

## 2021-03-22 RX ORDER — LIDOCAINE HYDROCHLORIDE 40 MG/ML
INJECTION, SOLUTION RETROBULBAR; TOPICAL PRN
Status: DISCONTINUED | OUTPATIENT
Start: 2021-03-22 | End: 2021-03-22 | Stop reason: ALTCHOICE

## 2021-03-22 RX ORDER — FENTANYL CITRATE 50 UG/ML
INJECTION, SOLUTION INTRAMUSCULAR; INTRAVENOUS PRN
Status: DISCONTINUED | OUTPATIENT
Start: 2021-03-22 | End: 2021-03-22 | Stop reason: ALTCHOICE

## 2021-03-22 RX ORDER — SODIUM CHLORIDE 0.9 % (FLUSH) 0.9 %
10 SYRINGE (ML) INJECTION PRN
Status: DISCONTINUED | OUTPATIENT
Start: 2021-03-22 | End: 2021-03-22 | Stop reason: HOSPADM

## 2021-03-22 ASSESSMENT — PAIN SCALES - GENERAL
PAINLEVEL_OUTOF10: 0
PAINLEVEL_OUTOF10: 0

## 2021-03-22 NOTE — OP NOTE
Operative Note      Patient: [facility]  YOB: 1975  MRN: [id number]    Date of Procedure: 3/22/2021    Pre-Op Diagnosis: DX CHRONIC BILATERAL LOW BACK PAIN W/O SCIATICA    LUMBOSACRAL SPONDYLOSIS W/O MYELOPATHY    Post-Op Diagnosis: Same       Procedure(s):  LEFT MEDIAL BRANCH  NERVE RADIOFREQUENCY ABLATION L3-S1    Surgeon(s):  Marielos Oneill MD    Assistant:   * No surgical staff found *    Anesthesia: IV Sedation    Estimated Blood Loss (mL): Minimal    Complications: None    Specimens:   * No specimens in log *    Implants:  * No implants in log *      Drains: * No LDAs found *    Findings: N/A    Detailed Description of Procedure:   Preoperative Diagnosis: Lumbar spondylosis w/o myelopathy, chronic low back pain  Postoperative Diagnosis: Lumbar spondylosis w/o myelopathy, chronic low back pain  SEDATION: SEE SEDATION NOTE  BLOOD LOSS: NONE    Procedure Performed:  :Radiofrequency ablation of median branches at the Transverse processes of L3 / L4 / L5 and S1 on Left under fluoroscopic guidance with IV sedation. Procedure:    After starting an IV, the patient was taken to procedure room. The patient was placed in prone position and skin over the back was prepped and draped in sterile manner. Standard monitors were connected and vitals were monitored during the case and they remained stable during the procedure. A meaningful communication was kept up with the patient throughout the procedure. Then using fluoroscopy the junction of the transverse process of the target vertebra with the superior process of the facet joint was observed and the view was optimized. The skin and deep tissues were infiltrated with 2 ml of  1 % lidocaine. The RF canula with the 10 mm active tip was introduced through the skin wheal under fluoroscopy guidance such that the tip of the needle lies in the groove of the transverse process with the superior processes of the facet joint.   Then a lateral and AP view of the lumbar spine was obtained to make sure the tip of needle is not in the neural foramen. Then electric impedence was checked to make sure it is acceptable. Then a sensory stimulus was applied at 50 Hz up to 0.5 volt and concordant pain symptoms were reproduced. Then a motor stimulus was applied at 2 Hz up to 2 volts or 3 x times the sensory stimulus and no motor stimulation was seen in lower extremities. Some multifidus stimulus was seen. Then after negative aspiration 1 ml of 4% lidocaine was injected through the needle at each level. The radiofrequency lesion was done at 85 degrees centigrade for 110 seconds. For L5 median branch block the junction of the ala of  the sacrum with the superior articular process of the facet joint was taken as a reference point. For the other levels median branch nerves the junction of the transverse process with the superolateral possible facet joint was taken as a reference point    Patient's vital signs and neurological status remained stable throughout the procedure and post procedural period. The patient tolerated the procedure well and was discharged home in stable condition. Electronically signed by Marielos Oneill MD on 3/22/2021 at 10:49 AM  SEDATION NOTE:    ASA CLASSIFICATION  2  MP   CLASSIFICATION  2    · Moderate intravenous conscious sedation was supervised by Dr. Earnestine Paredes  . The patient was independently monitored by a Registered Nurse assigned to the Procedure Room  . Monitoring included automated blood pressure, continuous EKG, Capnography and continuous pulse oximetry. . The detailed Conscious Record is permanently stored in the [place] [id number].      The following is the conscious sedation record;  Start Time:  0934  End times:  0959  Duration:  25 MINUTES  MEDS GIVEN 2 MG VERSED  MCG FENTANYL

## 2021-03-22 NOTE — H&P
History and Physical Update    Pt Name: [facility] [place]  MRN: [id number]  YOB: 1975  Date of evaluation: 3/22/2021      [x] I have reviewed the Office Note found in [facility] dated 3/15/21 by Addie Blancoich meets the criteria for an Interval History and Physical note and is attached below. Procedure: Left medial branch nerve radio frequency ablation  Dx: Chronic bilateral low back pain without sciatica, lumbosacral spondylosis without myelopathy  [x] I have examined  [facility] [place] and there are no changes to the patient or plans. Denies recent illness fever or chills. Vital signs: /67   Pulse 107   Temp 96 °F (35.6 °C) (Temporal)   Resp 17   Ht 5' 10\" (1.778 m)   Wt 264 lb (119.7 kg)   SpO2 94%   BMI 37.88 kg/m²     Allergies:  Celebrex [celecoxib] and Penicillins    Medications:   No current facility-administered medications on file prior to encounter. Current Outpatient Medications on File Prior to Encounter   Medication Sig Dispense Refill    mirtazapine (REMERON) 15 MG tablet Take 45 mg by mouth nightly       sertraline (ZOLOFT) 100 MG tablet       risperiDONE (RISPERDAL) 2 MG tablet Take 3 mg by mouth 2 times daily       atorvastatin (LIPITOR) 40 MG tablet TAKE ONE TABLET BY MOUTH ONE TIME A DAY (Patient taking differently: Take 40 mg by mouth nightly ) 90 tablet 3    docusate sodium (COLACE) 100 MG capsule Take 1 capsule by mouth daily as needed for Constipation 60 capsule 3    lidocaine (LMX) 4 % cream Apply topically as needed for Pain Apply topically as needed.  acetaminophen (TYLENOL) 325 MG tablet Take 650 mg by mouth as needed for Pain. Prior to Admission medications    Medication Sig Start Date End Date Taking? Authorizing Provider   hydrOXYzine (ATARAX) 25 MG tablet  3/9/21  Yes Historical Provider, MD   traMADol (ULTRAM) 50 MG tablet Take 1 tablet by mouth every 8 hours as needed for Pain for up to 30 days.  Fill 1/29/20 2/17/21 3/22/21 Yes Kelsy Bhakta MD   mirtazapine (REMERON) 15 MG tablet Take 45 mg by mouth nightly    Yes Historical Provider, MD   sertraline (ZOLOFT) 100 MG tablet  8/1/20  Yes Historical Provider, MD   risperiDONE (RISPERDAL) 2 MG tablet Take 3 mg by mouth 2 times daily    Yes Historical Provider, MD   atorvastatin (LIPITOR) 40 MG tablet TAKE ONE TABLET BY MOUTH ONE TIME A DAY  Patient taking differently: Take 40 mg by mouth nightly  6/26/18  Yes Ban Guevara MD   docusate sodium (COLACE) 100 MG capsule Take 1 capsule by mouth daily as needed for Constipation 10/19/17  Yes Angeli Rocha MD   lidocaine (LMX) 4 % cream Apply topically as needed for Pain Apply topically as needed. Yes Historical Provider, MD   acetaminophen (TYLENOL) 325 MG tablet Take 650 mg by mouth as needed for Pain. Yes Historical Provider, MD   gabapentin (NEURONTIN) 300 MG capsule Take 1 capsule by mouth every 8 hours for 30 days. 2/17/21 3/19/21  Kelsy Bhakta MD       This is a 50 y.o. male who is  pleasant, cooperative, alert and oriented x3, in no acute distress. Heart: Heart regular rate and rhythm   Lungs:clear to auscultation without wheezes or rales    Abdomen: soft, nontender, nondistended, no masses or organomegaly. No results for input(s): COVID19 in the last 720 hours. BEHZAD Anderson CNP  Electronically signed 3/22/2021 at 9:16 AM     BEHZAD Castle CNP   Advanced Practice Nurse   Specialty:  Pain Management   Progress Notes   Signed   Encounter Date:  3/15/2021         Related encounter: Office Visit from 3/15/2021 in [facility] [place]         Signed        Expand AllCollapse All     Show:Clear all  [x]Manual[x]Template[x]Copied    Added by:  [x]Georgia Hoff CMA[x]BEHZAD Ng - CNP    []Lori for details        Patient is here today to review medication contract.          Chief Complaint   Patient presents with    Back Pain    Follow Up After Procedure            PMH     Patient complains of low back pain since [id number] with no known injury. He uses a cane for stability but states this is due to foot/ankle pain not back pain - he did have a fracture in the left ankle the past. In past he had lumbar steroid injection but steroids were discontinued because of psychiatric issue. He is also using TENS unit and OTC lidocaine cream with good relief. Patient states tramadol and RFA is managing his pain. Occasional right hip pain saw ortho prior to [facility] but has not f/u since it has improved somewhat      Procedures  2/21  bilat Radiofrequency ablation of median branches at the Transverse processes of L3 / L4 / L5 and S1 with 60% relief noted of lumbar pain but sciatica pain 100% relieved      HPI:   Back Pain  This is a chronic problem. The problem occurs constantly. The problem is unchanged. The pain is present in the lumbar spine. The quality of the pain is described as aching. The pain does not radiate. The pain is at a severity of 2/10. The pain is mild. Pertinent negatives include no chest pain or fever. Risk factors include obesity. He has tried analgesics (RFA) for the symptoms. The treatment provided significant relief. S/P:RIGHT MEDIAL BRANCH L3-S1 NERVE RADIOFREQUENCY ABLATION DOS 2/15/21        Outcome              Any improvement of activity?   Yes              Any side effects (appetite,leg cramping,facial fleshing):none              Increase of pain:  No  Pain score Today:  2  % of pain relief:60%  Pain diary (medial branch block): No        Pain score Today:  2  Adverse effects (Constipation / Nausea / Sedation / sexual Dysfunction / others) : constipation  Mood: good  Sleep pattern and quality: poor  Activity level: good     Pill count Today:# 29 Tramadol counted due 3/16 refill date adjusted 3/25  Last dose taken  3/15/21  OARRS report reviewed today: yes  ER/Hospitalizations/PCP visit related to pain since last visit:no   Any legal problems e.g. DUI etc.:No  Satisfied with current management: Yes     Opioid Contract:8/2/19  Last Urine Dug screen dated:2/22/21     Urine toxicology  Validity testing satisfactory  Positive for tramadol  Consistent with treatment   no illicit substance        No results found for: LABA1C  No results found for: EAG     Past Medical History, Past Surgical History, Social History, Allergies and Medications, reviewed and updated in EPIC as indicated  Controlled Substance Monitoring:     Acute and Chronic Pain Monitoring:   RX Monitoring 3/15/2021   Attestation -   Periodic Controlled Substance Monitoring Possible medication side effects, risk of tolerance/dependence & alternative treatments discussed. ;No signs of potential drug abuse or diversion identified. ;Assessed functional status. ;Obtaining appropriate analgesic effect of treatment. Chronic Pain > 50 MEDD -   Chronic Pain > 80 MEDD -                  Periodic Controlled Substance Monitoring: Possible medication side effects, risk of tolerance/dependence & alternative treatments discussed., No signs of potential drug abuse or diversion identified. , Assessed functional status., Obtaining appropriate analgesic effect of treatment.  Anastacio Alberto, APRN - CNP)        Past Medical History        Past Medical History:   Diagnosis Date    Back pain, chronic      Bipolar 2 disorder (H[facility])      Bronchitis      Degenerative disc disease      Depression      Displacement of lumbar intervertebral disc without myelopathy 5/7/2013    Fibula fracture LEFT    History of gastric ulcer      History of suicidal tendencies      Hyperlipidemia      Lumbar pain with radiation down both legs      Lumbar stenosis      Non-union of fracture LEFT ANKLE    Osteoarthritis      Pneumonia      Sleep disturbance      Spider bite       on great toe on left    Spondylosis      Tenosynovitis of ankle LEFT            Past Surgical History         Past Surgical History:   Procedure Laterality Date    ADENOIDECTOMY Reactions    Celebrex [Celecoxib] Shortness Of Breath       CHEST PAIN    Penicillins Hives and Nausea And Vomiting           Current Medication      Current Outpatient Medications:     hydrOXYzine (ATARAX) 25 MG tablet, , Disp: , Rfl:     traMADol (ULTRAM) 50 MG tablet, Take 1 tablet by mouth every 8 hours as needed for Pain for up to 30 days. Fill 1/29/20, Disp: 90 tablet, Rfl: 1    gabapentin (NEURONTIN) 300 MG capsule, Take 1 capsule by mouth every 8 hours for 30 days. , Disp: 90 capsule, Rfl: 1    mirtazapine (REMERON) 15 MG tablet, Take 45 mg by mouth nightly , Disp: , Rfl:     sertraline (ZOLOFT) 100 MG tablet, , Disp: , Rfl:     risperiDONE (RISPERDAL) 2 MG tablet, Take 3 mg by mouth 2 times daily , Disp: , Rfl:     atorvastatin (LIPITOR) 40 MG tablet, TAKE ONE TABLET BY MOUTH ONE TIME A DAY (Patient taking differently: Take 40 mg by mouth nightly ), Disp: 90 tablet, Rfl: 3    docusate sodium (COLACE) 100 MG capsule, Take 1 capsule by mouth daily as needed for Constipation, Disp: 60 capsule, Rfl: 3    lidocaine (LMX) 4 % cream, Apply topically as needed for Pain Apply topically as needed. , Disp: , Rfl:     acetaminophen (TYLENOL) 325 MG tablet, Take 650 mg by mouth as needed for Pain., Disp: , Rfl:         Family History         Family History   Problem Relation Age of Onset    Breast Cancer Mother      Liver Cancer Mother      Other Father           brain cysts            Social History               Socioeconomic History    Marital status:        Spouse name: Not on file    Number of children: Not on file    Years of education: Not on file    Highest education level: Not on file   Occupational History    Not on file   Social Needs    Financial resource strain: Not on file    Food insecurity       Worry: Not on file       Inability: Not on file    Transportation needs       Medical: Not on file       Non-medical: Not on file   Tobacco Use    Smoking status: Former Smoker Packs/day: 1.00       Years: 22.00       Pack years: 22.00       Types: Cigarettes    Smokeless tobacco: Never Used    Tobacco comment: 1/2 to 1 ppd   Substance and Sexual Activity    Alcohol use: No       Alcohol/week: 0.0 standard drinks    Drug use: No    Sexual activity: Not on file   Lifestyle    Physical activity       Days per week: Not on file       Minutes per session: Not on file    Stress: Not on file   Relationships    Social connections       Talks on phone: Not on file       Gets together: Not on file       Attends Buddhist service: Not on file       Active member of club or organization: Not on file       Attends meetings of clubs or organizations: Not on file       Relationship status: Not on file    Intimate partner violence       Fear of current or ex partner: Not on file       Emotionally abused: Not on file       Physically abused: Not on file       Forced sexual activity: Not on file   Other Topics Concern    Not on file   Social History Narrative    Not on file            Review of Systems:  Review of Systems   Constitution: Negative. Negative for chills and fever. Cardiovascular: Negative for chest pain. Respiratory: Negative. Negative for cough and shortness of breath. Musculoskeletal: Positive for back pain. Gastrointestinal: Negative for constipation. Neurological: Negative. Physical Exam:  /89   Pulse 104   Temp 98 °F (36.7 °C)   Ht 5' 10\" (1.778 m)   Wt 246 lb 9.6 oz (111.9 kg)   SpO2 97%   BMI 35.38 kg/m²      Physical Exam  Cardiovascular:      Rate and Rhythm: Normal rate. Pulmonary:      Effort: Pulmonary effort is normal.   Musculoskeletal: Normal range of motion. Skin:     General: Skin is warm and dry. Neurological:      Mental Status: He is alert and oriented to person, place, and time.                 Assessment:       Problem List Items Addressed This Visit      Lumbosacral spondylosis without myelopathy - Primary (Chronic)

## 2021-04-20 DIAGNOSIS — M54.50 CHRONIC BILATERAL LOW BACK PAIN WITHOUT SCIATICA: ICD-10-CM

## 2021-04-20 DIAGNOSIS — M47.817 LUMBOSACRAL SPONDYLOSIS WITHOUT MYELOPATHY: Chronic | ICD-10-CM

## 2021-04-20 DIAGNOSIS — G89.29 CHRONIC BILATERAL LOW BACK PAIN WITHOUT SCIATICA: ICD-10-CM

## 2021-04-20 RX ORDER — GABAPENTIN 300 MG/1
300 CAPSULE ORAL EVERY 8 HOURS SCHEDULED
Qty: 90 CAPSULE | Refills: 1 | Status: SHIPPED | OUTPATIENT
Start: 2021-04-20 | End: 2021-05-10 | Stop reason: SDUPTHER

## 2021-04-20 RX ORDER — TRAMADOL HYDROCHLORIDE 50 MG/1
50 TABLET ORAL EVERY 8 HOURS PRN
Qty: 90 TABLET | Refills: 0 | Status: SHIPPED | OUTPATIENT
Start: 2021-04-20 | End: 2021-05-10 | Stop reason: SDUPTHER

## 2021-04-20 NOTE — TELEPHONE ENCOUNTER
Selma Florez is requesting a refill on the following medications:   Requested Prescriptions     Pending Prescriptions Disp Refills    traMADol (ULTRAM) 50 MG tablet 90 tablet 1     Sig: Take 1 tablet by mouth every 8 hours as needed for Pain for up to 30 days. Fill 1/29/20    gabapentin (NEURONTIN) 300 MG capsule 90 capsule 1     Sig: Take 1 capsule by mouth every 8 hours for 30 days. Last OV 3/15/21    Future Appointments   Date Time Provider Pepe Fink   5/10/2021  2:00 PM BEHZAD Sweeney - CNP [facility]       OARRS report sent to Dr. Audelia Boateng through alternative route for review.

## 2021-05-10 ENCOUNTER — OFFICE VISIT (OUTPATIENT)
Dept: PAIN MANAGEMENT | Age: 46
End: 2021-05-10
Payer: COMMERCIAL

## 2021-05-10 VITALS
HEART RATE: 97 BPM | SYSTOLIC BLOOD PRESSURE: 116 MMHG | HEIGHT: 70 IN | OXYGEN SATURATION: 98 % | DIASTOLIC BLOOD PRESSURE: 61 MMHG | BODY MASS INDEX: 37.51 KG/M2 | WEIGHT: 262 LBS

## 2021-05-10 DIAGNOSIS — M47.817 LUMBOSACRAL SPONDYLOSIS WITHOUT MYELOPATHY: Chronic | ICD-10-CM

## 2021-05-10 DIAGNOSIS — G89.29 CHRONIC BILATERAL LOW BACK PAIN WITHOUT SCIATICA: Primary | ICD-10-CM

## 2021-05-10 DIAGNOSIS — M54.50 CHRONIC BILATERAL LOW BACK PAIN WITHOUT SCIATICA: Primary | ICD-10-CM

## 2021-05-10 DIAGNOSIS — M51.26 DISPLACEMENT OF LUMBAR INTERVERTEBRAL DISC WITHOUT MYELOPATHY: ICD-10-CM

## 2021-05-10 DIAGNOSIS — M51.36 DDD (DEGENERATIVE DISC DISEASE), LUMBAR: ICD-10-CM

## 2021-05-10 DIAGNOSIS — Z79.891 CHRONIC USE OF OPIATE DRUG FOR THERAPEUTIC PURPOSE: ICD-10-CM

## 2021-05-10 PROCEDURE — 99213 OFFICE O/P EST LOW 20 MIN: CPT | Performed by: NURSE PRACTITIONER

## 2021-05-10 RX ORDER — CYCLOBENZAPRINE HCL 10 MG
TABLET ORAL
COMMUNITY
Start: 2021-03-15 | End: 2022-01-10 | Stop reason: SDUPTHER

## 2021-05-10 RX ORDER — GABAPENTIN 300 MG/1
300 CAPSULE ORAL EVERY 8 HOURS SCHEDULED
Qty: 90 CAPSULE | Refills: 1 | Status: SHIPPED | OUTPATIENT
Start: 2021-05-10 | End: 2021-07-12 | Stop reason: SDUPTHER

## 2021-05-10 RX ORDER — CYCLOBENZAPRINE HCL 10 MG
10 TABLET ORAL DAILY PRN
Qty: 90 TABLET | Refills: 1 | Status: SHIPPED | OUTPATIENT
Start: 2021-05-10 | End: 2021-08-08

## 2021-05-10 RX ORDER — TRAMADOL HYDROCHLORIDE 50 MG/1
50 TABLET ORAL EVERY 8 HOURS PRN
Qty: 90 TABLET | Refills: 1 | Status: SHIPPED | OUTPATIENT
Start: 2021-05-24 | End: 2021-07-12 | Stop reason: SDUPTHER

## 2021-05-10 ASSESSMENT — ENCOUNTER SYMPTOMS
SHORTNESS OF BREATH: 0
COUGH: 0
BACK PAIN: 1
CONSTIPATION: 0
RESPIRATORY NEGATIVE: 1

## 2021-05-10 NOTE — PROGRESS NOTES
Patient is here today to review medication contract. Chief Complaint   Patient presents with    Back Pain    Medication Refill         [facility]     Patient complains of low back pain since [id number] with no known injury. He uses a cane for stability but states this is due to foot/ankle pain not back pain - he did have a fracture in the left ankle the past. In past he had lumbar steroid injection but steroids were discontinued because of psychiatric issue. [facility] is also using TENS unit and OTC lidocaine cream with good relief. Patient states tramadol and RFA is managing his pain. Occasional right hip pain saw ortho prior to Rajat but has not f/u since it has improved somewhat.      Pt has stopped remeron d/t weight gain and ineffectiveness, has appt with psychiatrist to discuss options to help him sleep    Procedures  2/21 3/2021  bilat Radiofrequency ablation of median branches at the Transverse processes of L3 / L4 / L5 and S1 with 60% relief noted of lumbar pain but sciatica pain 100% relieved       HPI:   Back Pain  This is a chronic problem. The current episode started more than 1 year ago. The problem occurs constantly. The problem has been gradually improving since onset. The pain is present in the lumbar spine. The quality of the pain is described as aching. The pain does not radiate. The pain is at a severity of 2/10. The pain is mild. The pain is worse during the day. The symptoms are aggravated by bending and position. Pertinent negatives include no chest pain, fever, numbness, paresthesias or tingling. Risk factors include lack of exercise and obesity. He has tried analgesics (RFA) for the symptoms. The treatment provided moderate relief.        Medication Refill: Tramadol Gabapentin Flexeril    Pain score Today:  2  Adverse effects (Constipation / Nausea / Sedation / sexual Dysfunction / others) : constipation  Mood: good  Sleep pattern and quality: poor  Activity level: good    Pill count Today:44 Tramadol due 5/20 prescription adjusted appropriately to 5/24  Last dose taken  5/10/21  OARRS report reviewed today: yes  Morphine equivalent:15   ER/Hospitalizations/PCP visit related to pain since last visit:no   Any legal problems e.g. DUI etc.:No  Satisfied with current management: Yes    Opioid Contract:8/2/19  Last Urine Dug screen dated:2/22/21      Past Medical History, Past Surgical History, Social History, Allergies and Medications reviewed and updated in EPIC as indicated    Family History reviewed and is noncontributory. Controlled Substance Monitoring:    Acute and Chronic Pain Monitoring:   RX Monitoring 5/10/2021   Attestation -   Periodic Controlled Substance Monitoring Possible medication side effects, risk of tolerance/dependence & alternative treatments discussed. ;No signs of potential drug abuse or diversion identified. ;Assessed functional status. ;Obtaining appropriate analgesic effect of treatment. Chronic Pain > 50 MEDD -   Chronic Pain > 80 MEDD -           Periodic Controlled Substance Monitoring: Possible medication side effects, risk of tolerance/dependence & alternative treatments discussed., No signs of potential drug abuse or diversion identified. , Assessed functional status., Obtaining appropriate analgesic effect of treatment.  Emilio Daley, APRN - CNP)      Past Medical History:   Diagnosis Date    Back pain, chronic     Bipolar 2 disorder (HCC)     Bronchitis     DDD (degenerative disc disease), lumbar     Depression     Displacement of lumbar intervertebral disc without myelopathy 5/7/2013    Fibula fracture LEFT    History of gastric ulcer     History of suicidal tendencies     Hyperlipidemia     Lumbar pain with radiation down both legs     Lumbar stenosis     Non-union of fracture LEFT ANKLE    Osteoarthritis     Pneumonia     Sleep disturbance     Spider bite     on great toe on left    Spondylosis     Tenosynovitis of ankle LEFT       Past [facility] [place] [address] ENDOSCOPY      x1    URETHRAL STRICTURE DILATATION         Allergies   Allergen Reactions    Celebrex [Celecoxib] Shortness Of Breath     CHEST PAIN    Penicillins Hives and Nausea And Vomiting         Current Outpatient Medications:     cyclobenzaprine (FLEXERIL) 10 MG tablet, , Disp: , Rfl:     traMADol (ULTRAM) 50 MG tablet, Take 1 tablet by mouth every 8 hours as needed for Pain for up to 30 days. Fill 1/29/20, Disp: 90 tablet, Rfl: 0    gabapentin (NEURONTIN) 300 MG capsule, Take 1 capsule by mouth every 8 hours for 30 days. , Disp: 90 capsule, Rfl: 1    hydrOXYzine (ATARAX) 25 MG tablet, , Disp: , Rfl:     sertraline (ZOLOFT) 100 MG tablet, , Disp: , Rfl:     risperiDONE (RISPERDAL) 2 MG tablet, Take 3 mg by mouth 2 times daily , Disp: , Rfl:     atorvastatin (LIPITOR) 40 MG tablet, TAKE ONE TABLET BY MOUTH ONE TIME A DAY (Patient taking differently: Take 40 mg by mouth nightly ), Disp: 90 tablet, Rfl: 3    docusate sodium (COLACE) 100 MG capsule, Take 1 capsule by mouth daily as needed for Constipation, Disp: 60 capsule, Rfl: 3    lidocaine (LMX) 4 % cream, Apply topically as needed for Pain Apply topically as needed. , Disp: , Rfl:     acetaminophen (TYLENOL) 325 MG tablet, Take 650 mg by mouth as needed for Pain., Disp: , Rfl:     mirtazapine (REMERON) 15 MG tablet, Take 45 mg by mouth nightly , Disp: , Rfl:     Family History   Problem Relation Age of Onset    Breast Cancer Mother     Liver Cancer Mother     Other Father         brain cysts       Social History     Socioeconomic History    Marital status:      Spouse name: Not on file    Number of children: Not on file    Years of education: Not on file    Highest education level: Not on file   Occupational History    Not on file   Social Needs    Financial resource strain: Not on file    Food insecurity     Worry: Not on file     Inability: Not on file  Transportation needs     Medical: Not on file     Non-medical: Not on file   Tobacco Use    Smoking status: Former Smoker     Packs/day: 1.00     Years: 22.00     Pack years: 22.00     Types: Cigarettes    Smokeless tobacco: Never Used    Tobacco comment: 1/2 to 1 ppd   Substance and Sexual Activity    Alcohol use: No     Alcohol/week: 0.0 standard drinks    Drug use: No    Sexual activity: Not on file   Lifestyle    Physical activity     Days per week: Not on file     Minutes per session: Not on file    Stress: Not on file   Relationships    Social connections     Talks on phone: Not on file     Gets together: Not on file     Attends Mosque service: Not on file     Active member of club or organization: Not on file     Attends meetings of clubs or organizations: Not on file     Relationship status: Not on file    Intimate partner violence     Fear of current or ex partner: Not on file     Emotionally abused: Not on file     Physically abused: Not on file     Forced sexual activity: Not on file   Other Topics Concern    Not on file   Social History Narrative    Not on file       Review of Systems:  Review of Systems   Constitution: Negative. Negative for chills and fever. Cardiovascular: Negative. Negative for chest pain. Respiratory: Negative. Negative for cough and shortness of breath. Musculoskeletal: Positive for back pain. Leg pain intermitted   Gastrointestinal: Negative for constipation. Neurological: Negative for numbness, paresthesias and tingling. Physical Exam:  /61   Pulse 97   Ht 5' 10\" (1.778 m)   Wt 262 lb (118.8 kg)   SpO2 98%   BMI 37.59 kg/m²     Physical Exam  Cardiovascular:      Rate and Rhythm: Normal rate. Pulmonary:      Effort: Pulmonary effort is normal.   Musculoskeletal: Normal range of motion. Skin:     General: Skin is warm and dry. Neurological:      Mental Status: He is alert and oriented to person, place, and time. Assessment:  Problem List Items Addressed This Visit     Lumbosacral spondylosis without myelopathy (Chronic)    Relevant Medications    cyclobenzaprine (FLEXERIL) 10 MG tablet    Displacement of lumbar intervertebral disc without myelopathy    Chronic back pain - Primary    Relevant Medications    cyclobenzaprine (FLEXERIL) 10 MG tablet    Chronic use of opiate drug for therapeutic purpose    DDD (degenerative disc disease), lumbar    Relevant Medications    cyclobenzaprine (FLEXERIL) 10 MG tablet             Treatment Plan:  Patient relates current medications are helping the pain. Patient reports taking pain medications as prescribed, denies obtaining medications from different sources and denies use of illegal drugs. Patient denies side effects from medications like nausea, vomiting, constipation or drowsiness. Patient reports current activities of daily living are possible due to medications and would like to continue them. As always, we encourage daily stretching and strengthening exercises, and recommend minimizing use of pain medications unless patient cannot get through daily activities due to pain. Contract requirements met. Continue opioid therapy.  Script written for tramadol flexeril and gabapentin  Follow up appointment made for 8 weeks

## 2021-07-12 ENCOUNTER — OFFICE VISIT (OUTPATIENT)
Dept: PAIN MANAGEMENT | Age: 46
End: 2021-07-12
Payer: COMMERCIAL

## 2021-07-12 VITALS
DIASTOLIC BLOOD PRESSURE: 75 MMHG | BODY MASS INDEX: 37.22 KG/M2 | HEIGHT: 70 IN | HEART RATE: 99 BPM | WEIGHT: 260 LBS | SYSTOLIC BLOOD PRESSURE: 140 MMHG

## 2021-07-12 DIAGNOSIS — Z79.891 CHRONIC USE OF OPIATE DRUG FOR THERAPEUTIC PURPOSE: Primary | ICD-10-CM

## 2021-07-12 DIAGNOSIS — M54.50 CHRONIC BILATERAL LOW BACK PAIN WITHOUT SCIATICA: ICD-10-CM

## 2021-07-12 DIAGNOSIS — M47.817 LUMBOSACRAL SPONDYLOSIS WITHOUT MYELOPATHY: Chronic | ICD-10-CM

## 2021-07-12 DIAGNOSIS — M51.36 DDD (DEGENERATIVE DISC DISEASE), LUMBAR: ICD-10-CM

## 2021-07-12 DIAGNOSIS — G89.29 CHRONIC BILATERAL LOW BACK PAIN WITHOUT SCIATICA: ICD-10-CM

## 2021-07-12 PROCEDURE — 99213 OFFICE O/P EST LOW 20 MIN: CPT | Performed by: NURSE PRACTITIONER

## 2021-07-12 RX ORDER — GABAPENTIN 300 MG/1
300 CAPSULE ORAL EVERY 8 HOURS SCHEDULED
Qty: 90 CAPSULE | Refills: 1 | Status: SHIPPED | OUTPATIENT
Start: 2021-07-12 | End: 2021-09-17 | Stop reason: SDUPTHER

## 2021-07-12 RX ORDER — TRAMADOL HYDROCHLORIDE 50 MG/1
50 TABLET ORAL EVERY 8 HOURS PRN
Qty: 90 TABLET | Refills: 1 | Status: SHIPPED | OUTPATIENT
Start: 2021-07-25 | End: 2021-09-17 | Stop reason: SDUPTHER

## 2021-07-12 ASSESSMENT — ENCOUNTER SYMPTOMS
BACK PAIN: 1
COUGH: 0
CONSTIPATION: 0
SHORTNESS OF BREATH: 0

## 2021-07-12 NOTE — PROGRESS NOTES
Patient is here today to review medication contract. Chief Complaint: back pain   Medication Refill: tramadol     [facility]     Patient complains of low back pain since [id number] with no known injury. He uses a cane for stability but states this is due to foot/ankle pain not back pain - he did have a fracture in the left ankle the past. In past he had lumbar steroid injection but steroids were discontinued because of psychiatric issue. [facility] is also using TENS unit and OTC lidocaine cream with good relief. Patient states tramadol and RFA is managing his pain. Occasional right hip pain saw ortho prior to Rajat but has not f/u since it has improved somewhat.      Procedures  2/21 3/2021  bilat Radiofrequency ablation of median branches at the Transverse processes of L3 / L4 / L5 and S1 with 60% relief noted of lumbar pain but sciatica pain 100% relieved         HPI:   Back Pain  This is a chronic problem. The current episode started more than 1 month ago. The problem occurs constantly. The problem is unchanged. The pain is present in the lumbar spine. The pain does not radiate. The pain is at a severity of 3/10. The pain is mild. The pain is worse during the day. The symptoms are aggravated by position and standing. Associated symptoms include paresthesias. Pertinent negatives include no chest pain, dysuria or fever. He has tried analgesics for the symptoms. The treatment provided mild relief.        Pain score Today:  3  Adverse effects (Constipation / Nausea / Sedation / sexual Dysfunction / others) : none  Mood: poor  Sleep pattern and quality: poor  Activity level: good    Pill count Today: 39  3 extra days prescription adjusted appropriately  Last dose taken  7/12/2021  OARRS report reviewed today: yes  Morphine equivalent: 15  ER/Hospitalizations/PCP visit related to pain since last visit:no   Any legal problems e.g. DUI etc.:No  Satisfied with current management: Yes    Opioid Contract: 8/2/2019  Last Urine Dug screen dated: 7/22/2020 7/26/2021  Urine toxicology  Validity testing satisfactory  Positive for tramadol  Consistent with treatment   no illicit substance       No results found for: LABA1C  No results found for: EAG    Past Medical History, Past Surgical History, Social History, Allergies and Medications reviewed and updated in EPIC as indicated    Family History reviewed and is noncontributory. Controlled Substance Monitoring:    Acute and Chronic Pain Monitoring:   RX Monitoring 7/12/2021   Attestation -   Periodic Controlled Substance Monitoring Possible medication side effects, risk of tolerance/dependence & alternative treatments discussed. ;No signs of potential drug abuse or diversion identified. ;Assessed functional status. ;Obtaining appropriate analgesic effect of treatment. ;Random urine drug screen sent today. Chronic Pain > 50 MEDD -   Chronic Pain > 80 MEDD -             Periodic Controlled Substance Monitoring: Possible medication side effects, risk of tolerance/dependence & alternative treatments discussed., No signs of potential drug abuse or diversion identified. , Assessed functional status., Obtaining appropriate analgesic effect of treatment. , Random urine drug screen sent today.  Shane Pena, APRN - CNP)      Past Medical History:   Diagnosis Date    Back pain, chronic     Bipolar 2 disorder (HCC)     Bronchitis     DDD (degenerative disc disease), lumbar     Depression     Displacement of lumbar intervertebral disc without myelopathy 5/7/2013    Fibula fracture LEFT    History of gastric ulcer     History of suicidal tendencies     Hyperlipidemia     Lumbar pain with radiation down both legs     Lumbar stenosis     Non-union of fracture LEFT ANKLE    Osteoarthritis     Pneumonia     Sleep disturbance     Spider bite     on great toe on left    Spondylosis     Tenosynovitis of ankle LEFT       Past Surgical History:   Procedure Laterality Date    ADENOIDECTOMY      HC INJECT OTHER PERPHRL NERV Bilateral 5/9/2019    INJECTION FACET LUMBAR L4-S1 performed by [facility] Pe[facility] at [facility]  8/8/13    Duramorph, Celestone 9mg    NERVE BLOCK  4-24-14    duramorph epidural steroid block, celestone 9mg, morphine 1.25mg    NERVE BLOCK  12/19/2014    DURAMORPH - CELESTONE 9 MG- DURAMORPH  1.5MG    NERVE BLOCK  7/23/15    duramorph 1.5mg celestone 9mg    NERVE BLOCK  11/19/15    Tens-Empi Select    NERVE BLOCK  3-10-16    duramorph epidural steroid block duramorph 1.5 mg decadron 7.5 mg    NERVE BLOCK  12/12/2016    duramorph caudal epidural,decadron 7.5 mg, duramorph 1.5 mg    NERVE BLOCK  10/23/2017    duramorph, duramorph 1.5, decadron 10mg    NERVE BLOCK Bilateral 05/02/2019    bilat  medial branch nerve block   marcaine 1/4%    OTHER SURGICAL HISTORY Bilateral 05/09/2019    facet lumbar injection L4-S1    OTHER SURGICAL HISTORY Left 11/11/2019    Nerve Radiofrequency Ablation Left Lumbar Medial Branch L2-L5    PAIN MANAGEMENT PROCEDURE Right 6/18/2020    NERVE RADIOFREQUENCY ABLATION RIGHT SIDED MEDIAL BRANCH   L2-L5 performed by Lesia Johnson MD at [address] [place] [address] Left 7/6/2020    LEFT MEDIAL BRANCH L2-L5 NERVE RADIOFREQUENCY ABLATION performed by Lesia Johnson MD at [address] [place] [address] Right 2/15/2021    RIGHT MEDIAL BRANCH L3-S1 NERVE RADIOFREQUENCY ABLATION performed by Lesia Johnson MD at [address] [place] [address] Left 3/22/2021    LEFT MEDIAL [address] L3-S1 performed by Lesia Johnson MD at [address] [place] [address] Right 10/21/2019    NERVE RADIOFREQUENCY ABLATION RIGHT LUMBAR MEDIAL BRANCH L2-L5 performed by Lesia Johnson MD at [address] [place] [address] Left 11/11/2019    NERVE RADIOFREQUENCY ABLATION LEFT LUMBAR [facility] L2-L5 performed by Lesia Johnson MD at [address] [place] [address] GASTROINTESTINAL ENDOSCOPY      x1    URETHRAL STRICTURE DILATATION         Allergies   Allergen Reactions    Celebrex [Celecoxib] Shortness Of Breath     CHEST PAIN    Penicillins Hives and Nausea And Vomiting         Current Outpatient Medications:     traMADol (ULTRAM) 50 MG tablet, Take 1 tablet by mouth every 8 hours as needed for Pain for up to 30 days. , Disp: 90 tablet, Rfl: 1    gabapentin (NEURONTIN) 300 MG capsule, Take 1 capsule by mouth every 8 hours for 30 days. , Disp: 90 capsule, Rfl: 1    cyclobenzaprine (FLEXERIL) 10 MG tablet, Take 1 tablet by mouth daily as needed for Muscle spasms, Disp: 90 tablet, Rfl: 1    cyclobenzaprine (FLEXERIL) 10 MG tablet, , Disp: , Rfl:     hydrOXYzine (ATARAX) 25 MG tablet, , Disp: , Rfl:     sertraline (ZOLOFT) 100 MG tablet, , Disp: , Rfl:     risperiDONE (RISPERDAL) 2 MG tablet, Take 3 mg by mouth 2 times daily , Disp: , Rfl:     atorvastatin (LIPITOR) 40 MG tablet, TAKE ONE TABLET BY MOUTH ONE TIME A DAY (Patient taking differently: Take 40 mg by mouth nightly ), Disp: 90 tablet, Rfl: 3    docusate sodium (COLACE) 100 MG capsule, Take 1 capsule by mouth daily as needed for Constipation, Disp: 60 capsule, Rfl: 3    lidocaine (LMX) 4 % cream, Apply topically as needed for Pain Apply topically as needed. , Disp: , Rfl:     acetaminophen (TYLENOL) 325 MG tablet, Take 650 mg by mouth as needed for Pain., Disp: , Rfl:     Family History   Problem Relation Age of Onset    Breast Cancer Mother     Liver Cancer Mother     Other Father         brain cysts       Social History     Socioeconomic History    Marital status:      Spouse name: Not on file    Number of children: Not on file    Years of education: Not on file    Highest education level: Not on file   Occupational History    Not on file   Tobacco Use    Smoking status: Former Smoker     Packs/day: 1.00     Years: 22.00     Pack years: 22.00     Types: Cigarettes    Smokeless tobacco: Never Used    Tobacco comment: 1/2 to 1 ppd   Vaping Use    Vaping Use: Every day    Substances: Nicotine   Substance and Sexual Activity    Alcohol use: No     Alcohol/week: 0.0 standard drinks    Drug use: No    Sexual activity: Not on file   Other Topics Concern    Not on file   Social History Narrative    Not on file     Social Determinants of Health     Financial Resource Strain:     Difficulty of Paying Living Expenses:    Food Insecurity:     Worried About Running Out of Food in the Last Year:     Magui of Food in the Last Year:    Transportation Needs:     Lack of Transportation (Medical):  Lack of Transportation (Non-Medical):    Physical Activity:     Days of Exercise per Week:     Minutes of Exercise per Session:    Stress:     Feeling of Stress :    Social Connections:     Frequency of Communication with Friends and Family:     Frequency of Social Gatherings with Friends and Family:     Attends Yazidism Services:     Active Member of Clubs or Organizations:     Attends Club or Organization Meetings:     Marital Status:    Intimate Partner Violence:     Fear of Current or Ex-Partner:     Emotionally Abused:     Physically Abused:     Sexually Abused:        Review of Systems:  Review of Systems   Constitutional: Negative for chills and fever. Cardiovascular: Negative for chest pain. Respiratory: Negative for cough and shortness of breath. Musculoskeletal: Positive for back pain. Gastrointestinal: Negative for constipation. Genitourinary: Negative for dysuria. Neurological: Positive for paresthesias. Physical Exam:  BP (!) 140/75   Pulse 99   Ht 5' 10\" (1.778 m)   Wt 260 lb (117.9 kg)   BMI 37.31 kg/m²     Physical Exam  Cardiovascular:      Rate and Rhythm: Normal rate. Pulmonary:      Effort: Pulmonary effort is normal.   Musculoskeletal:         General: Normal range of motion. Skin:     General: Skin is warm and dry.    Neurological:      Mental Status: He is alert and oriented to person, place, and time. Assessment:  Problem List Items Addressed This Visit     Lumbosacral spondylosis without myelopathy (Chronic)    Chronic back pain    Chronic use of opiate drug for therapeutic purpose - Primary    DDD (degenerative disc disease), lumbar             Treatment Plan:  Patient relates current medications are helping the pain. Patient reports taking pain medications as prescribed, denies obtaining medications from different sources and denies use of illegal drugs. Patient denies side effects from medications like nausea, vomiting, constipation or drowsiness. Patient reports current activities of daily living are possible due to medications and would like to continue them. As always, we encourage daily stretching and strengthening exercises, and recommend minimizing use of pain medications unless patient cannot get through daily activities due to pain. Contract requirements met. Continue opioid therapy. Script written for tramadol flexeril and gabapentin  Follow up appointment made for 8 weeks  ROSSI obtained today for monitoring purposes.  Last dose of medication was today

## 2021-09-17 RX ORDER — BUPROPION HYDROCHLORIDE 150 MG/1
150 TABLET, EXTENDED RELEASE ORAL DAILY
COMMUNITY

## 2021-09-17 ASSESSMENT — ENCOUNTER SYMPTOMS
BACK PAIN: 1
RESPIRATORY NEGATIVE: 1

## 2021-09-17 NOTE — PROGRESS NOTES
Patient is here today to review medication contract. Chief Complaint   Patient presents with    Back Pain    Medication Refill         [facility]     Patient complains of low back pain since [id number] with no known injury. He uses a cane for stability but states this is due to foot/ankle pain not back pain - he did have a fracture in the left ankle the past. In past he had lumbar steroid injection but steroids were discontinued because of psychiatric issue. [facility] is also using TENS unit and OTC lidocaine cream with good relief. Patient states tramadol and RFA is managing his pain. Occasional right hip pain saw ortho prior to Rajat but has not f/u since it has improved somewhat.      Procedures  2/21 3/2021  bilat Radiofrequency ablation of median branches at the Transverse processes of L3 / L4 / L5 and S1 with 60% relief noted of lumbar pain but sciatica pain 100% relieved     HPI:   Back Pain  This is a chronic problem. The current episode started more than 1 year ago. The problem occurs constantly. The problem has been gradually worsening since onset. The pain is present in the lumbar spine. The quality of the pain is described as aching. The pain radiates to the right knee and left knee. The pain is at a severity of 2/10. The pain is the same all the time. The symptoms are aggravated by position, lying down and bending. Pertinent negatives include no chest pain, fever, numbness or paresthesias. He has tried heat and home exercises for the symptoms. The treatment provided mild relief.      Medication Refill: Tramadol, Gabapentin    Pain score Today:  2  Adverse effects (Constipation / Nausea / Sedation / sexual Dysfunction / others) : No  Mood: good  Sleep pattern and quality: poor  Activity level: fair    Pill count Today: #18   Last dose taken  09/17/21  OARRS report reviewed today: yes  Morphine equivalent: 15  ER/Hospitalizations/PCP visit related to pain since last visit:no   Any legal problems e.g. DUI etc.:No  Satisfied with current management: Yes    Opioid Contract:08/02/19  Last Urine Dug screen dated:07/26/21    No results found for: LABA1C  No results found for: EAG    Past Medical History, Past Surgical History, Social History, Allergies and Medications reviewed and updated in EPIC as indicated    Family History reviewed and is noncontributory. Controlled Substance Monitoring:    Acute and Chronic Pain Monitoring:   RX Monitoring 9/20/2021   Attestation -   Periodic Controlled Substance Monitoring Possible medication side effects, risk of tolerance/dependence & alternative treatments discussed. ;No signs of potential drug abuse or diversion identified. ;Assessed functional status. ;Obtaining appropriate analgesic effect of treatment. Chronic Pain > 50 MEDD -   Chronic Pain > 80 MEDD -           Periodic Controlled Substance Monitoring: Possible medication side effects, risk of tolerance/dependence & alternative treatments discussed., No signs of potential drug abuse or diversion identified. , Assessed functional status., Obtaining appropriate analgesic effect of treatment.  Tom Brian, APRN - CNP)      Past Medical History:   Diagnosis Date    Back pain, chronic     Bipolar 2 disorder (HCC)     Bronchitis     DDD (degenerative disc disease), lumbar     Depression     Displacement of lumbar intervertebral disc without myelopathy 5/7/2013    Fibula fracture LEFT    History of gastric ulcer     History of suicidal tendencies     Hyperlipidemia     Lumbar pain with radiation down both legs     Lumbar stenosis     Non-union of fracture LEFT ANKLE    Osteoarthritis     Pneumonia     Sleep disturbance     Spider bite     on great toe on left    Spondylosis     Tenosynovitis of ankle LEFT       Past Surgical History:   Procedure Laterality Date    ADENOIDECTOMY      HC INJECT OTHER PERPHRL NERV Bilateral 5/9/2019    INJECTION FACET LUMBAR L4-S1 performed by Anna Arevalo MD at [facility] OR    NERVE BLOCK  8/8/13    Duramorph, Celestone 9mg    NERVE BLOCK  4-24-14    duramorph epidural steroid block, celestone 9mg, morphine 1.25mg    NERVE BLOCK  12/19/2014    DURAMORPH - CELESTONE 9 MG- DURAMORPH  1.5MG    NERVE BLOCK  7/23/15    duramorph 1.5mg celestone 9mg    NERVE BLOCK  11/19/15    Tens-Empi Select    NERVE BLOCK  3-10-16    duramorph epidural steroid block duramorph 1.5 mg decadron 7.5 mg    NERVE BLOCK  12/12/2016    duramorph caudal epidural,decadron 7.5 mg, duramorph 1.5 mg    NERVE BLOCK  10/23/2017    duramorph, duramorph 1.5, decadron 10mg    NERVE BLOCK Bilateral 05/02/2019    bilat  medial branch nerve block   marcaine 1/4%    OTHER SURGICAL HISTORY Bilateral 05/09/2019    facet lumbar injection L4-S1    OTHER SURGICAL HISTORY Left 11/11/2019    Nerve Radiofrequency Ablation Left Lumbar Medial Branch L2-L5    PAIN MANAGEMENT PROCEDURE Right 6/18/2020    NERVE RADIOFREQUENCY ABLATION RIGHT SIDED MEDIAL BRANCH   L2-L5 performed by Josias Castañeda MD at [address] [place] [address] Left 7/6/2020    LEFT MEDIAL BRANCH L2-L5 NERVE RADIOFREQUENCY ABLATION performed by Josias Castañeda MD at [address] [place] [address] Right 2/15/2021    RIGHT MEDIAL BRANCH L3-S1 NERVE RADIOFREQUENCY ABLATION performed by Josias Castañeda MD at [address] [place] [address] Left 3/22/2021    LEFT MEDIAL [facility] L3-S1 performed by Josias Castañeda MD at [address] [place] [address] Right 10/21/2019    NERVE RADIOFREQUENCY ABLATION RIGHT LUMBAR MEDIAL BRANCH L2-L5 performed by Josias Castañeda MD at [address] [place] [address] Left 11/11/2019    NERVE RADIOFREQUENCY ABLATION LEFT LUMBAR [facility] L2-L5 performed by Josias Castañeda MD at [facility]      UPPER GASTROINTESTINAL ENDOSCOPY      x1    URETHRAL STRICTURE DILATATION         Allergies   Allergen Reactions    Celebrex [Celecoxib] Shortness Of Breath     CHEST PAIN    Penicillins Hives and Nausea And Vomiting         Current Outpatient Medications:     buPROPion (WELLBUTRIN SR) 150 MG extended release tablet, Take 150 mg by mouth daily, Disp: , Rfl:     traMADol (ULTRAM) 50 MG tablet, Take 1 tablet by mouth every 8 hours as needed for Pain for up to 30 days. , Disp: 90 tablet, Rfl: 1    gabapentin (NEURONTIN) 300 MG capsule, Take 1 capsule by mouth every 8 hours for 30 days. , Disp: 90 capsule, Rfl: 1    cyclobenzaprine (FLEXERIL) 10 MG tablet, , Disp: , Rfl:     hydrOXYzine (ATARAX) 25 MG tablet, , Disp: , Rfl:     sertraline (ZOLOFT) 100 MG tablet, , Disp: , Rfl:     risperiDONE (RISPERDAL) 2 MG tablet, Take 3 mg by mouth 2 times daily , Disp: , Rfl:     atorvastatin (LIPITOR) 40 MG tablet, TAKE ONE TABLET BY MOUTH ONE TIME A DAY (Patient taking differently: Take 40 mg by mouth nightly ), Disp: 90 tablet, Rfl: 3    docusate sodium (COLACE) 100 MG capsule, Take 1 capsule by mouth daily as needed for Constipation, Disp: 60 capsule, Rfl: 3    lidocaine (LMX) 4 % cream, Apply topically as needed for Pain Apply topically as needed. , Disp: , Rfl:     acetaminophen (TYLENOL) 325 MG tablet, Take 650 mg by mouth as needed for Pain., Disp: , Rfl:     Family History   Problem Relation Age of Onset    Breast Cancer Mother     Liver Cancer Mother     Other Father         brain cysts       Social History     Socioeconomic History    Marital status:      Spouse name: Not on file    Number of children: Not on file    Years of education: Not on file    Highest education level: Not on file   Occupational History    Not on file   Tobacco Use    Smoking status: Former Smoker     Packs/day: 1.00     Years: 22.00     Pack years: 22.00     Types: Cigarettes    Smokeless tobacco: Never Used    Tobacco comment: 1/2 to 1 ppd   Vaping Use    Vaping Use: Every day    Start date: 2/17/2019    Substances: Nicotine, Flavoring  Devices: Refillable tank   Substance and Sexual Activity    Alcohol use: No     Alcohol/week: 0.0 standard drinks    Drug use: No    Sexual activity: Not on file   Other Topics Concern    Not on file   Social History Narrative    Not on file     Social Determinants of Health     Financial Resource Strain:     Difficulty of Paying Living Expenses:    Food Insecurity:     Worried About Running Out of Food in the Last Year:     Ran Out of Food in the Last Year:    Transportation Needs:     Lack of Transportation (Medical):  Lack of Transportation (Non-Medical):    Physical Activity:     Days of Exercise per Week:     Minutes of Exercise per Session:    Stress:     Feeling of Stress :    Social Connections:     Frequency of Communication with Friends and Family:     Frequency of Social Gatherings with Friends and Family:     Attends [employer] Services:     Active Member of Clubs or Organizations:     Attends Club or Organization Meetings:     Marital Status:    Intimate Partner Violence:     Fear of Current or Ex-Partner:     Emotionally Abused:     Physically Abused:     Sexually Abused:        Review of Systems:  Review of Systems   Constitutional: Negative. Negative for chills and fever. Cardiovascular: Negative for chest pain. Respiratory: Negative. Negative for cough and shortness of breath. Musculoskeletal: Positive for arthritis, back pain, joint pain, muscle weakness, myalgias and stiffness. Gastrointestinal: Negative for constipation. Neurological: Negative. Negative for numbness and paresthesias. Physical Exam:  /77   Pulse 122   Ht 5' 10\" (1.778 m)   Wt 260 lb (117.9 kg)   SpO2 97%   BMI 37.31 kg/m²     Physical Exam  Cardiovascular:      Rate and Rhythm: Normal rate. Pulmonary:      Effort: Pulmonary effort is normal.   Musculoskeletal:         General: Normal range of motion. Skin:     General: Skin is warm and dry.    Neurological:      Mental Status: He is alert and oriented to person, place, and time. Assessment:  Problem List Items Addressed This Visit     Lumbosacral spondylosis without myelopathy (Chronic)    Chronic back pain    Relevant Medications    buPROPion (WELLBUTRIN SR) 150 MG extended release tablet             Treatment Plan:  Patient relates current medications are helping the pain. Patient reports taking pain medications as prescribed, denies obtaining medications from different sources and denies use of illegal drugs. Patient denies side effects from medications like nausea, vomiting, constipation or drowsiness. Patient reports current activities of daily living are possible due to medications and would like to continue them. As always, we encourage daily stretching and strengthening exercises, and recommend minimizing use of pain medications unless patient cannot get through daily activities due to pain. Contract requirements met. Continue opioid therapy.  Script written for tramadol and gabapentin  Plan RFA 2/2022 d/t insurance coverage   Follow up appointment made for 8 weeks

## 2021-09-20 ENCOUNTER — OFFICE VISIT (OUTPATIENT)
Dept: PAIN MANAGEMENT | Age: 46
End: 2021-09-20
Payer: COMMERCIAL

## 2021-09-20 VITALS
BODY MASS INDEX: 37.22 KG/M2 | HEIGHT: 70 IN | DIASTOLIC BLOOD PRESSURE: 77 MMHG | WEIGHT: 260 LBS | SYSTOLIC BLOOD PRESSURE: 130 MMHG | OXYGEN SATURATION: 97 % | HEART RATE: 122 BPM

## 2021-09-20 DIAGNOSIS — G89.29 CHRONIC BILATERAL LOW BACK PAIN WITHOUT SCIATICA: ICD-10-CM

## 2021-09-20 DIAGNOSIS — M54.50 CHRONIC BILATERAL LOW BACK PAIN WITHOUT SCIATICA: ICD-10-CM

## 2021-09-20 DIAGNOSIS — Z79.891 CHRONIC USE OF OPIATE DRUG FOR THERAPEUTIC PURPOSE: Primary | ICD-10-CM

## 2021-09-20 DIAGNOSIS — M47.817 LUMBOSACRAL SPONDYLOSIS WITHOUT MYELOPATHY: Chronic | ICD-10-CM

## 2021-09-20 PROCEDURE — 99213 OFFICE O/P EST LOW 20 MIN: CPT | Performed by: NURSE PRACTITIONER

## 2021-09-20 RX ORDER — GABAPENTIN 300 MG/1
300 CAPSULE ORAL EVERY 8 HOURS SCHEDULED
Qty: 90 CAPSULE | Refills: 1 | Status: SHIPPED | OUTPATIENT
Start: 2021-09-20 | End: 2022-01-10 | Stop reason: SDUPTHER

## 2021-09-20 RX ORDER — TRAMADOL HYDROCHLORIDE 50 MG/1
50 TABLET ORAL EVERY 8 HOURS PRN
Qty: 90 TABLET | Refills: 1 | Status: SHIPPED | OUTPATIENT
Start: 2021-09-25 | End: 2021-11-15 | Stop reason: SDUPTHER

## 2021-09-20 ASSESSMENT — ENCOUNTER SYMPTOMS
CONSTIPATION: 0
COUGH: 0
SHORTNESS OF BREATH: 0

## 2021-11-15 ENCOUNTER — OFFICE VISIT (OUTPATIENT)
Dept: PAIN MANAGEMENT | Age: 46
End: 2021-11-15
Payer: COMMERCIAL

## 2021-11-15 VITALS
HEIGHT: 70 IN | DIASTOLIC BLOOD PRESSURE: 78 MMHG | WEIGHT: 263.8 LBS | SYSTOLIC BLOOD PRESSURE: 131 MMHG | HEART RATE: 110 BPM | OXYGEN SATURATION: 96 % | BODY MASS INDEX: 37.77 KG/M2

## 2021-11-15 DIAGNOSIS — M54.50 CHRONIC BILATERAL LOW BACK PAIN WITHOUT SCIATICA: ICD-10-CM

## 2021-11-15 DIAGNOSIS — Z79.891 CHRONIC USE OF OPIATE DRUG FOR THERAPEUTIC PURPOSE: Primary | ICD-10-CM

## 2021-11-15 DIAGNOSIS — M47.817 LUMBOSACRAL SPONDYLOSIS WITHOUT MYELOPATHY: Chronic | ICD-10-CM

## 2021-11-15 DIAGNOSIS — M51.36 DDD (DEGENERATIVE DISC DISEASE), LUMBAR: ICD-10-CM

## 2021-11-15 DIAGNOSIS — G89.29 CHRONIC BILATERAL LOW BACK PAIN WITHOUT SCIATICA: ICD-10-CM

## 2021-11-15 PROCEDURE — 99213 OFFICE O/P EST LOW 20 MIN: CPT | Performed by: NURSE PRACTITIONER

## 2021-11-15 RX ORDER — TRAMADOL HYDROCHLORIDE 50 MG/1
50 TABLET ORAL EVERY 8 HOURS PRN
Qty: 90 TABLET | Refills: 1 | Status: SHIPPED | OUTPATIENT
Start: 2021-11-22 | End: 2022-01-10 | Stop reason: SDUPTHER

## 2021-11-15 ASSESSMENT — ENCOUNTER SYMPTOMS
RESPIRATORY NEGATIVE: 1
EYES NEGATIVE: 1
SHORTNESS OF BREATH: 0
CONSTIPATION: 0
BACK PAIN: 1
COUGH: 0

## 2021-11-15 NOTE — PROGRESS NOTES
Patient is here today to review medication contract. Chief Complaint   Patient presents with    Back Pain    Medication Refill         [facility]     Patient complains of low back pain since [id number] with no known injury. He uses a cane for stability but states this is due to foot/ankle pain not back pain - he did have a fracture in the left ankle the past. In past he had lumbar steroid injection but steroids were discontinued because of psychiatric issue. [facility] is also using TENS unit and OTC lidocaine cream with good relief. Patient states tramadol and RFA is managing his pain. Pain is worsening with plans to repeat in Feb     Procedures  2/21 3/2021  bilat Radiofrequency ablation of median branches at the Transverse processes of L3 / L4 / L5 and S1 with 60% relief noted of lumbar pain but sciatica pain 100% relieved     HPI:   Back Pain  This is a chronic problem. The current episode started more than 1 year ago. The problem occurs constantly. The problem has been gradually worsening since onset. The pain is present in the lumbar spine. The quality of the pain is described as aching. The pain radiates to the left knee and right knee. The pain is at a severity of 3/10. The pain is mild. The symptoms are aggravated by bending, sitting and standing. Pertinent negatives include no chest pain, fever, numbness or paresthesias. Risk factors include obesity, poor posture and lack of exercise. He has tried analgesics and home exercises for the symptoms. The treatment provided mild relief.      Chief Complaint:  Medication Refill:     Pain score Today:  3  Adverse effects (Constipation / Nausea / Sedation / sexual Dysfunction / others) : none  Mood: fair  Sleep pattern and quality: fair  Activity level: fair    Pill count Today:22   Last dose taken  11/15/21  OARRS report reviewed today: yes  Morphine equivalent: 15  ER/Hospitalizations/PCP visit related to pain since last visit:no   Any legal problems e.g. DUI etc.:No  Satisfied with current management: Yes    Opioid Contract:8/5/19  Last Urine Dug screen dated:7/22/20    No results found for: LABA1C  No results found for: EAG    Past Medical History, Past Surgical History, Social History, Allergies and Medications reviewed and updated in EPIC as indicated    Family History reviewed and is noncontributory. Controlled Substance Monitoring:    Acute and Chronic Pain Monitoring:   RX Monitoring 11/15/2021   Attestation -   Periodic Controlled Substance Monitoring Possible medication side effects, risk of tolerance/dependence & alternative treatments discussed. ;No signs of potential drug abuse or diversion identified. ;Assessed functional status. ;Obtaining appropriate analgesic effect of treatment. Chronic Pain > 50 MEDD -   Chronic Pain > 80 MEDD -           Periodic Controlled Substance Monitoring: Possible medication side effects, risk of tolerance/dependence & alternative treatments discussed., No signs of potential drug abuse or diversion identified. , Assessed functional status., Obtaining appropriate analgesic effect of treatment.  Keaton Valera, APRN - CNP)      Past Medical History:   Diagnosis Date    Back pain, chronic     Bipolar 2 disorder (HCC)     Bronchitis     DDD (degenerative disc disease), lumbar     Depression     Displacement of lumbar intervertebral disc without myelopathy 5/7/2013    Fibula fracture LEFT    History of gastric ulcer     History of suicidal tendencies     Hyperlipidemia     Lumbar pain with radiation down both legs     Lumbar stenosis     Non-union of fracture LEFT ANKLE    Osteoarthritis     Pneumonia     Sleep disturbance     Spider bite     on great toe on left    Spondylosis     Tenosynovitis of ankle LEFT       Past Surgical History:   Procedure Laterality Date    ADENOIDECTOMY      HC INJECT OTHER PERPHRL NERV Bilateral 5/9/2019    INJECTION FACET LUMBAR L4-S1 performed by Lalo Rodriguez MD at [facility] OR    NERVE BLOCK  8/8/13    Duramorph, Celestone 9mg    NERVE BLOCK  4-24-14    duramorph epidural steroid block, celestone 9mg, morphine 1.25mg    NERVE BLOCK  12/19/2014    DURAMORPH - CELESTONE 9 MG- DURAMORPH  1.5MG    NERVE BLOCK  7/23/15    duramorph 1.5mg celestone 9mg    NERVE BLOCK  11/19/15    Tens-Empi Select    NERVE BLOCK  3-10-16    duramorph epidural steroid block duramorph 1.5 mg decadron 7.5 mg    NERVE BLOCK  12/12/2016    duramorph caudal epidural,decadron 7.5 mg, duramorph 1.5 mg    NERVE BLOCK  10/23/2017    duramorph, duramorph 1.5, decadron 10mg    NERVE BLOCK Bilateral 05/02/2019    bilat  medial branch nerve block   marcaine 1/4%    OTHER SURGICAL HISTORY Bilateral 05/09/2019    facet lumbar injection L4-S1    OTHER SURGICAL HISTORY Left 11/11/2019    Nerve Radiofrequency Ablation Left Lumbar Medial Branch L2-L5    PAIN MANAGEMENT PROCEDURE Right 6/18/2020    NERVE RADIOFREQUENCY ABLATION RIGHT SIDED MEDIAL BRANCH   L2-L5 performed by Dylan Gonzales MD at [address] [place] [address] Left 7/6/2020    LEFT MEDIAL BRANCH L2-L5 NERVE RADIOFREQUENCY ABLATION performed by Dylan Gonzales MD at [address] [place] [address] Right 2/15/2021    RIGHT MEDIAL BRANCH L3-S1 NERVE RADIOFREQUENCY ABLATION performed by Dylan Gonzales MD at [address] [place] [address] Left 3/22/2021    LEFT MEDIAL [facility] L3-S1 performed by Dylan Gonzales MD at [address] Right 10/21/2019    NERVE RADIOFREQUENCY ABLATION RIGHT LUMBAR MEDIAL BRANCH L2-L5 performed by Dylan Gonzales MD at [address] Left 11/11/2019    NERVE RADIOFREQUENCY ABLATION LEFT LUMBAR [facility] L2-L5 performed by Dylan Gonzales MD at [place] [address] ENDOSCOPY      x1    URETHRAL STRICTURE DILATATION         Allergies   Allergen Reactions    Celebrex [Celecoxib] Shortness Of Breath     CHEST PAIN    Penicillins Hives and Nausea And Vomiting         Current Outpatient Medications:     [START ON 11/22/2021] traMADol (ULTRAM) 50 MG tablet, Take 1 tablet by mouth every 8 hours as needed for Pain for up to 30 days. , Disp: 90 tablet, Rfl: 1    gabapentin (NEURONTIN) 300 MG capsule, Take 1 capsule by mouth every 8 hours for 30 days. , Disp: 90 capsule, Rfl: 1    buPROPion (WELLBUTRIN SR) 150 MG extended release tablet, Take 150 mg by mouth daily, Disp: , Rfl:     cyclobenzaprine (FLEXERIL) 10 MG tablet, , Disp: , Rfl:     hydrOXYzine (ATARAX) 25 MG tablet, , Disp: , Rfl:     sertraline (ZOLOFT) 100 MG tablet, , Disp: , Rfl:     risperiDONE (RISPERDAL) 2 MG tablet, Take 3 mg by mouth 2 times daily , Disp: , Rfl:     atorvastatin (LIPITOR) 40 MG tablet, TAKE ONE TABLET BY MOUTH ONE TIME A DAY (Patient taking differently: Take 40 mg by mouth nightly ), Disp: 90 tablet, Rfl: 3    docusate sodium (COLACE) 100 MG capsule, Take 1 capsule by mouth daily as needed for Constipation, Disp: 60 capsule, Rfl: 3    lidocaine (LMX) 4 % cream, Apply topically as needed for Pain Apply topically as needed. , Disp: , Rfl:     acetaminophen (TYLENOL) 325 MG tablet, Take 650 mg by mouth as needed for Pain., Disp: , Rfl:     Family History   Problem Relation Age of Onset    Breast Cancer Mother     Liver Cancer Mother     Other Father         brain cysts       Social History     Socioeconomic History    Marital status:      Spouse name: Not on file    Number of children: Not on file    Years of education: Not on file    Highest education level: Not on file   Occupational History    Not on file   Tobacco Use    Smoking status: Former Smoker     Packs/day: 1.00     Years: 22.00     Pack years: 22.00     Types: Cigarettes    Smokeless tobacco: Never Used    Tobacco comment: 1/2 to 1 ppd   Vaping Use    Vaping Use: Every day    Start date: 2/17/2019    Substances: Nicotine, Flavoring    Devices: Refillable tank   Substance and Sexual Activity    Alcohol use: No     Alcohol/week: 0.0 standard drinks    Drug use: No    Sexual activity: Not on file   Other Topics Concern    Not on file   Social History Narrative    Not on file     Social Determinants of Health     Financial Resource Strain:     Difficulty of Paying Living Expenses: Not on file   Food Insecurity:     Worried About Running Out of Food in the Last Year: Not on file    [place] of Food in the Last Year: Not on file   Transportation Needs:     Lack of Transportation (Medical): Not on file    Lack of Transportation (Non-Medical): Not on file   Physical Activity:     Days of Exercise per Week: Not on file    Minutes of Exercise per Session: Not on file   Stress:     Feeling of Stress : Not on file   Social Connections:     Frequency of Communication with Friends and Family: Not on file    Frequency of Social Gatherings with Friends and Family: Not on file    Attends [employer] Services: Not on file    Active Member of [address] [place] [address] [employer] or Organizations: Not on file    Attends Club or Organization Meetings: Not on file    Marital Status: Not on file   Intimate Partner Violence:     Fear of Current or Ex-Partner: Not on file    Emotionally Abused: Not on file    Physically Abused: Not on file    Sexually Abused: Not on file   Housing Stability:     Unable to Pay for Housing in the Last Year: Not on file    Number of [place] in the Last Year: Not on file    Unstable Housing in the Last Year: Not on file       Review of Systems:  Review of Systems   Constitutional: Negative. Negative for chills and fever. Eyes: Negative. Cardiovascular: Negative for chest pain. Respiratory: Negative. Negative for cough and shortness of breath. Musculoskeletal: Positive for back pain. Gastrointestinal: Negative for constipation. Neurological: Negative for numbness and paresthesias.        Physical Exam:  /78

## 2022-01-10 ENCOUNTER — OFFICE VISIT (OUTPATIENT)
Dept: PAIN MANAGEMENT | Age: 47
End: 2022-01-10
Payer: COMMERCIAL

## 2022-01-10 VITALS
WEIGHT: 265 LBS | DIASTOLIC BLOOD PRESSURE: 81 MMHG | BODY MASS INDEX: 37.94 KG/M2 | HEIGHT: 70 IN | HEART RATE: 100 BPM | OXYGEN SATURATION: 95 % | SYSTOLIC BLOOD PRESSURE: 131 MMHG

## 2022-01-10 DIAGNOSIS — M51.26 DISPLACEMENT OF LUMBAR INTERVERTEBRAL DISC WITHOUT MYELOPATHY: ICD-10-CM

## 2022-01-10 DIAGNOSIS — M47.817 LUMBOSACRAL SPONDYLOSIS WITHOUT MYELOPATHY: Chronic | ICD-10-CM

## 2022-01-10 DIAGNOSIS — M48.061 SPINAL STENOSIS OF LUMBAR REGION WITHOUT NEUROGENIC CLAUDICATION: ICD-10-CM

## 2022-01-10 DIAGNOSIS — Z79.891 CHRONIC USE OF OPIATE DRUG FOR THERAPEUTIC PURPOSE: ICD-10-CM

## 2022-01-10 DIAGNOSIS — G89.29 CHRONIC BILATERAL LOW BACK PAIN WITHOUT SCIATICA: Primary | ICD-10-CM

## 2022-01-10 DIAGNOSIS — M54.50 CHRONIC BILATERAL LOW BACK PAIN WITHOUT SCIATICA: Primary | ICD-10-CM

## 2022-01-10 PROCEDURE — 99213 OFFICE O/P EST LOW 20 MIN: CPT | Performed by: NURSE PRACTITIONER

## 2022-01-10 RX ORDER — TRAMADOL HYDROCHLORIDE 50 MG/1
50 TABLET ORAL EVERY 8 HOURS PRN
Qty: 90 TABLET | Refills: 1 | Status: SHIPPED | OUTPATIENT
Start: 2022-01-20 | End: 2022-03-17 | Stop reason: SDUPTHER

## 2022-01-10 RX ORDER — CYCLOBENZAPRINE HCL 10 MG
10 TABLET ORAL DAILY PRN
Qty: 30 TABLET | Refills: 2 | Status: SHIPPED | OUTPATIENT
Start: 2022-01-10 | End: 2022-03-17 | Stop reason: SDUPTHER

## 2022-01-10 RX ORDER — GABAPENTIN 300 MG/1
300 CAPSULE ORAL EVERY 8 HOURS SCHEDULED
Qty: 90 CAPSULE | Refills: 1 | Status: SHIPPED | OUTPATIENT
Start: 2022-01-10 | End: 2022-03-17 | Stop reason: SDUPTHER

## 2022-01-10 ASSESSMENT — ENCOUNTER SYMPTOMS
BACK PAIN: 1
RESPIRATORY NEGATIVE: 1

## 2022-01-10 NOTE — PROGRESS NOTES
Patient is here today to review medication contract. Chief Complaint   Patient presents with    Back Pain    Medication Refill       [facility]     Patient complains of low back pain since [id number] with no known injury. He uses a cane for stability but states this is due to foot/ankle pain not back pain - he did have a fracture in the left ankle the past. In past he had lumbar steroid injection but steroids were discontinued because of psychiatric issue. Sharon Phillips is also using TENS unit and OTC lidocaine cream with good relief. Patient states tramadol and RFA is managing his pain. Pain is worsening with plans to repeat in Feb     Procedures  2/21 3/2021  bilat Radiofrequency ablation of median branches at the Transverse processes of L3 / L4 / L5 and S1 with 60% relief noted of lumbar pain but sciatica pain 100% relieved     Back Pain  This is a chronic problem. The current episode started more than 1 year ago. The problem occurs constantly. The problem has been gradually worsening since onset. The pain is present in the lumbar spine. The quality of the pain is described as aching. The pain radiates to the left knee and right knee. The pain is at a severity of 3/10. The pain is mild. The pain is worse during the day. The symptoms are aggravated by bending, position, sitting and standing. Associated symptoms include numbness and paresthesias. Risk factors include obesity, poor posture and lack of exercise. He has tried analgesics for the symptoms. The treatment provided mild relief.      Chief Complaint:  Medication Refill: needs refill of gabapentin, flexeril, tramadol    Pain score Today:  3  Adverse effects constipation  Mood: good  Sleep pattern and quality: fair  Activity level: fair    Pill count Today:28  Last dose taken- tramadol 1/10/2022  OARRS report reviewed today: yes  Morphine equivalent: 15  ER/Hospitalizations/PCP visit related to pain since last visit:yes   Any legal problems e.g. DUI etc.:No  Satisfied with current management: Yes    Opioid Contract:2018  Last Urine Dug screen dated:07/26/2021    No results found for: LABA1C  No results found for: EAG    Past Medical History, Past Surgical History, Social History, Allergies and Medications reviewed and updated in EPIC as indicated    Family History reviewed and is noncontributory. Controlled Substance Monitoring:    Acute and Chronic Pain Monitoring:   RX Monitoring 1/10/2022   Attestation -   Periodic Controlled Substance Monitoring Possible medication side effects, risk of tolerance/dependence & alternative treatments discussed. ;No signs of potential drug abuse or diversion identified. ;Assessed functional status. ;Obtaining appropriate analgesic effect of treatment. Chronic Pain > 50 MEDD -   Chronic Pain > 80 MEDD -             Periodic Controlled Substance Monitoring: Possible medication side effects, risk of tolerance/dependence & alternative treatments discussed. ,No signs of potential drug abuse or diversion identified. ,Assessed functional status. ,Obtaining appropriate analgesic effect of treatment.  BEHZAD Yañez - CNP)      Past Medical History:   Diagnosis Date    Back pain, chronic     Bipolar 2 disorder (HCC)     Bronchitis     DDD (degenerative disc disease), lumbar     Depression     Displacement of lumbar intervertebral disc without myelopathy 5/7/2013    Fibula fracture LEFT    History of gastric ulcer     History of suicidal tendencies     Hyperlipidemia     Lumbar pain with radiation down both legs     Lumbar stenosis     Non-union of fracture LEFT ANKLE    Osteoarthritis     Pneumonia     Sleep disturbance     Spider bite     on great toe on left    Spondylosis     Tenosynovitis of ankle LEFT       Past Surgical History:   Procedure Laterality Date    ADENOIDECTOMY      HC INJECT OTHER PERPHRL NERV Bilateral 5/9/2019    INJECTION FACET LUMBAR L4-S1 performed by Viktoriya Bates MD at [facility] CHEST PAIN    Penicillins Hives and Nausea And Vomiting         Current Outpatient Medications:     [START ON 1/20/2022] traMADol (ULTRAM) 50 MG tablet, Take 1 tablet by mouth every 8 hours as needed for Pain for up to 30 days. , Disp: 90 tablet, Rfl: 1    gabapentin (NEURONTIN) 300 MG capsule, Take 1 capsule by mouth every 8 hours for 30 days. , Disp: 90 capsule, Rfl: 1    cyclobenzaprine (FLEXERIL) 10 MG tablet, Take 1 tablet by mouth daily as needed for Muscle spasms, Disp: 30 tablet, Rfl: 2    buPROPion (WELLBUTRIN SR) 150 MG extended release tablet, Take 150 mg by mouth daily, Disp: , Rfl:     hydrOXYzine (ATARAX) 25 MG tablet, , Disp: , Rfl:     sertraline (ZOLOFT) 100 MG tablet, , Disp: , Rfl:     risperiDONE (RISPERDAL) 2 MG tablet, Take 3 mg by mouth 2 times daily , Disp: , Rfl:     atorvastatin (LIPITOR) 40 MG tablet, TAKE ONE TABLET BY MOUTH ONE TIME A DAY (Patient taking differently: Take 40 mg by mouth nightly ), Disp: 90 tablet, Rfl: 3    docusate sodium (COLACE) 100 MG capsule, Take 1 capsule by mouth daily as needed for Constipation, Disp: 60 capsule, Rfl: 3    lidocaine (LMX) 4 % cream, Apply topically as needed for Pain Apply topically as needed. , Disp: , Rfl:     acetaminophen (TYLENOL) 325 MG tablet, Take 650 mg by mouth as needed for Pain., Disp: , Rfl:     Family History   Problem Relation Age of Onset    Breast Cancer Mother     Liver Cancer Mother     Other Father         brain cysts       Social History     Socioeconomic History    Marital status:      Spouse name: Not on file    Number of children: Not on file    Years of education: Not on file    Highest education level: Not on file   Occupational History    Not on file   Tobacco Use    Smoking status: Former Smoker     Packs/day: 1.00     Years: 22.00     Pack years: 22.00     Types: Cigarettes    Smokeless tobacco: Never Used    Tobacco comment: 1/2 to 1 ppd   Vaping Use    Vaping Use: Every day    Start date: 2/17/2019    Substances: Nicotine, Flavoring    Devices: Refillable tank   Substance and Sexual Activity    Alcohol use: No     Alcohol/week: 0.0 standard drinks    Drug use: No    Sexual activity: Not on file   Other Topics Concern    Not on file   Social History Narrative    Not on file     Social Determinants of Health     Financial Resource Strain:     Difficulty of Paying Living Expenses: Not on file   Food Insecurity:     Worried About Running Out of Food in the Last Year: Not on file    [place] of Food in the Last Year: Not on file   Transportation Needs:     Lack of Transportation (Medical): Not on file    Lack of Transportation (Non-Medical): Not on file   Physical Activity:     Days of Exercise per Week: Not on file    Minutes of Exercise per Session: Not on file   Stress:     Feeling of Stress : Not on file   Social Connections:     Frequency of Communication with Friends and Family: Not on file    Frequency of Social Gatherings with Friends and Family: Not on file    Attends Hindu Services: Not on file    Active Member of [address] [place] [address] or Organizations: Not on file    Attends Club or Organization Meetings: Not on file    Marital Status: Not on file   Intimate Partner Violence:     Fear of Current or Ex-Partner: Not on file    Emotionally Abused: Not on file    Physically Abused: Not on file    Sexually Abused: Not on file   Housing Stability:     Unable to Pay for Housing in the Last Year: Not on file    Number of [place] in the Last Year: Not on file    Unstable Housing in the Last Year: Not on file       Review of Systems:  Review of Systems   Constitutional: Negative. Respiratory: Negative. Musculoskeletal: Positive for back pain. Negative for joint swelling, muscle cramps and stiffness. Neurological: Positive for numbness and paresthesias.        Physical Exam:  /81   Pulse 100   Ht 5' 10\" (1.778 m)   Wt 265 lb (120.2 kg)   SpO2 95%   BMI 38.02 kg/m²     Physical Exam  Cardiovascular:      Rate and Rhythm: Normal rate. Pulmonary:      Effort: Pulmonary effort is normal.   Musculoskeletal:         General: Normal range of motion. Skin:     General: Skin is warm and dry. Neurological:      Mental Status: He is alert and oriented to person, place, and time. Record/Diagnostics Review:    Last brett  and was appropriate     Assessment:  Problem List Items Addressed This Visit     Lumbosacral spondylosis without myelopathy (Chronic)    Relevant Medications    traMADol (ULTRAM) 50 MG tablet (Start on 1/20/2022)    gabapentin (NEURONTIN) 300 MG capsule    cyclobenzaprine (FLEXERIL) 10 MG tablet    Other Relevant Orders    Destruction by neurolytic agent    Displacement of lumbar intervertebral disc without myelopathy    Relevant Orders    Destruction by neurolytic agent    Chronic back pain - Primary    Relevant Medications    traMADol (ULTRAM) 50 MG tablet (Start on 1/20/2022)    gabapentin (NEURONTIN) 300 MG capsule    cyclobenzaprine (FLEXERIL) 10 MG tablet    Other Relevant Orders    Destruction by neurolytic agent    Chronic use of opiate drug for therapeutic purpose    Lumbar stenosis    Relevant Orders    Destruction by neurolytic agent             Treatment Plan:  Patient relates current medications are helping the pain. Patient reports taking pain medications as prescribed, denies obtaining medications from different sources and denies use of illegal drugs. Patient denies side effects from medications like nausea, vomiting, constipation or drowsiness. Patient reports current activities of daily living are possible due to medications and would like to continue them. As always, we encourage daily stretching and strengthening exercises, and recommend minimizing use of pain medications unless patient cannot get through daily activities due to pain. Contract requirements met. Continue opioid therapy.  Script written for tramadol gabapentin and tizanidine  bilat Radiofrequency ablation of median branches at the Transverse processes of L3 / L4 / L5 and S1 scheduled for march  Follow up appointment made for 8 weeks

## 2022-03-17 ENCOUNTER — OFFICE VISIT (OUTPATIENT)
Dept: PAIN MANAGEMENT | Age: 47
End: 2022-03-17
Payer: COMMERCIAL

## 2022-03-17 VITALS
OXYGEN SATURATION: 97 % | DIASTOLIC BLOOD PRESSURE: 78 MMHG | HEART RATE: 110 BPM | SYSTOLIC BLOOD PRESSURE: 112 MMHG | WEIGHT: 268 LBS | HEIGHT: 70 IN | BODY MASS INDEX: 38.37 KG/M2

## 2022-03-17 DIAGNOSIS — G89.29 CHRONIC BILATERAL LOW BACK PAIN WITHOUT SCIATICA: ICD-10-CM

## 2022-03-17 DIAGNOSIS — Z79.891 CHRONIC USE OF OPIATE FOR THERAPEUTIC PURPOSE: Primary | ICD-10-CM

## 2022-03-17 DIAGNOSIS — M47.817 LUMBOSACRAL SPONDYLOSIS WITHOUT MYELOPATHY: Chronic | ICD-10-CM

## 2022-03-17 DIAGNOSIS — M54.50 CHRONIC BILATERAL LOW BACK PAIN WITHOUT SCIATICA: ICD-10-CM

## 2022-03-17 PROCEDURE — 99213 OFFICE O/P EST LOW 20 MIN: CPT | Performed by: NURSE PRACTITIONER

## 2022-03-17 RX ORDER — GABAPENTIN 300 MG/1
300 CAPSULE ORAL EVERY 8 HOURS SCHEDULED
Qty: 90 CAPSULE | Refills: 1 | Status: SHIPPED | OUTPATIENT
Start: 2022-03-17 | End: 2022-04-07 | Stop reason: SDUPTHER

## 2022-03-17 RX ORDER — CYCLOBENZAPRINE HCL 10 MG
10 TABLET ORAL DAILY PRN
Qty: 30 TABLET | Refills: 2 | Status: SHIPPED | OUTPATIENT
Start: 2022-03-17 | End: 2022-08-08 | Stop reason: SDUPTHER

## 2022-03-17 RX ORDER — TRAMADOL HYDROCHLORIDE 50 MG/1
50 TABLET ORAL EVERY 8 HOURS PRN
Qty: 90 TABLET | Refills: 1 | Status: SHIPPED | OUTPATIENT
Start: 2022-03-19 | End: 2022-04-07 | Stop reason: SDUPTHER

## 2022-03-17 ASSESSMENT — ENCOUNTER SYMPTOMS
SHORTNESS OF BREATH: 0
COUGH: 0
CONSTIPATION: 0
SORE THROAT: 0
BACK PAIN: 1
WHEEZING: 0

## 2022-03-17 NOTE — PROGRESS NOTES
Chief Complaint   Patient presents with    Medication Refill     Tramadol, Gabapentin    Back Pain       [facility]     Patient complains of low back pain since [id number] with no known injury. He uses a cane for stability but states this is due to foot/ankle pain not back pain - he did have a fracture in the left ankle the past. In past he had lumbar steroid injection but steroids were discontinued because of psychiatric issue. [facility] is also using TENS unit and OTC lidocaine cream with good relief. Patient states tramadol and RFA is managing his pain. Pain is worsening with plans to repeat in March but denied by insurace -waiting for appeal from MD      Procedures  2/21 3/2021  bilat Radiofrequency ablation of median branches at the Transverse processes of L3 / L4 / L5 and S1 with 60% relief noted of lumbar pain but sciatica pain 100% relieved     HPI:   Back Pain  This is a chronic problem. The current episode started more than 1 year ago. The problem occurs constantly. The problem has been gradually worsening since onset. The pain is present in the lumbar spine. The quality of the pain is described as aching. Radiates to: occ to thighs. The pain is at a severity of 3/10. The pain is mild. The pain is worse during the day. The symptoms are aggravated by bending, position and standing. Associated symptoms include numbness and paresthesias. Pertinent negatives include no chest pain, fever or headaches. Risk factors include obesity, poor posture and lack of exercise. He has tried analgesics for the symptoms. The treatment provided mild relief.      Medication Refill: Tramadol, Gabapentin    Pain score Today:  3  Adverse effects (Constipation / Nausea / Sedation / sexual Dysfunction / others) : Constipation  Mood: good  Sleep pattern and quality: fair  Activity level: good    Pill count Today:12 tramadol  Last dose taken  2 am 03/17/2022  OARRS report reviewed today: yes  ER/Hospitalizations/PCP visit related to pain since last visit:no   Any legal problems e.g. DUI etc.:No  Satisfied with current management: Yes    Opioid Contract:3/17/72554  Last Urine Dug screen dated:07/26/2021    No results found for: LABA1C  No results found for: EAG    Past Medical History, Past Surgical History, Social History, Allergies and Medications reviewed and updated in EPIC as indicated    Family History reviewed and is noncontributory. Controlled Substance Monitoring:    Acute and Chronic Pain Monitoring:   RX Monitoring 3/17/2022   Attestation -   Periodic Controlled Substance Monitoring Possible medication side effects, risk of tolerance/dependence & alternative treatments discussed. ;No signs of potential drug abuse or diversion identified. ;Assessed functional status. ;Obtaining appropriate analgesic effect of treatment. ;Random urine drug screen sent today. Chronic Pain > 50 MEDD -   Chronic Pain > 80 MEDD -           Periodic Controlled Substance Monitoring: Possible medication side effects, risk of tolerance/dependence & alternative treatments discussed. ,No signs of potential drug abuse or diversion identified. ,Assessed functional status. ,Obtaining appropriate analgesic effect of treatment. ,Random urine drug screen sent today.  BEHZAD Swann - CNP)      Past Medical History:   Diagnosis Date    Back pain, chronic     Bipolar 2 disorder (HCC)     Bronchitis     DDD (degenerative disc disease), lumbar     Depression     Displacement of lumbar intervertebral disc without myelopathy 5/7/2013    Fibula fracture LEFT    History of gastric ulcer     History of suicidal tendencies     Hyperlipidemia     Lumbar pain with radiation down both legs     Lumbar stenosis     Non-union of fracture LEFT ANKLE    Osteoarthritis     Pneumonia     Sleep disturbance     Spider bite     on great toe on left    Spondylosis     Tenosynovitis of ankle LEFT       Past Surgical History:   Procedure Laterality Date    ADENOIDECTOMY      [facility]. INJECT OTHER PERPHRL NERV Bilateral 5/9/2019    INJECTION FACET LUMBAR L4-S1 performed by Mian Nicole MD at [facility]  8/8/13    Duramorph, Celestone 9mg    NERVE BLOCK  4-24-14    duramorph epidural steroid block, celestone 9mg, morphine 1.25mg    NERVE BLOCK  12/19/2014    DURAMORPH - CELESTONE 9 MG- DURAMORPH  1.5MG    NERVE BLOCK  7/23/15    duramorph 1.5mg celestone 9mg    NERVE BLOCK  11/19/15    Tens-Empi Select    NERVE BLOCK  3-10-16    duramorph epidural steroid block duramorph 1.5 mg decadron 7.5 mg    NERVE BLOCK  12/12/2016    duramorph caudal epidural,decadron 7.5 mg, duramorph 1.5 mg    NERVE BLOCK  10/23/2017    duramorph, duramorph 1.5, decadron 10mg    NERVE BLOCK Bilateral 05/02/2019    bilat  medial branch nerve block   marcaine 1/4%    OTHER SURGICAL HISTORY Bilateral 05/09/2019    facet lumbar injection L4-S1    OTHER SURGICAL HISTORY Left 11/11/2019    Nerve Radiofrequency Ablation Left Lumbar Medial Branch L2-L5    PAIN MANAGEMENT PROCEDURE Right 6/18/2020    NERVE RADIOFREQUENCY ABLATION RIGHT SIDED MEDIAL BRANCH   L2-L5 performed by Lawrence Mccall MD at [address] [place] [address] Left 7/6/2020    LEFT MEDIAL BRANCH L2-L5 NERVE RADIOFREQUENCY ABLATION performed by Lawrence Mccall MD at [address] [place] [address] Right 2/15/2021    RIGHT MEDIAL BRANCH L3-S1 NERVE RADIOFREQUENCY ABLATION performed by Lawrence Mccall MD at [address] [place] [address] Left 3/22/2021    LEFT MEDIAL [facility] L3-S1 performed by Lawrence Mccall MD at [address] [place] [address] Right 10/21/2019    NERVE RADIOFREQUENCY ABLATION RIGHT LUMBAR MEDIAL BRANCH L2-L5 performed by Lawrence Mccall MD at [address] [place] [address] Left 11/11/2019    NERVE RADIOFREQUENCY ABLATION LEFT LUMBAR [facility] L2-L5 performed by Lawrence Mccall MD at [address] [place] [address] GASTROINTESTINAL ENDOSCOPY      x1    URETHRAL STRICTURE DILATATION         Allergies   Allergen Reactions    Celebrex [Celecoxib] Shortness Of Breath     CHEST PAIN    Penicillins Hives and Nausea And Vomiting         Current Outpatient Medications:     [START ON 3/19/2022] traMADol (ULTRAM) 50 MG tablet, Take 1 tablet by mouth every 8 hours as needed for Pain for up to 30 days. , Disp: 90 tablet, Rfl: 1    gabapentin (NEURONTIN) 300 MG capsule, Take 1 capsule by mouth every 8 hours for 30 days. , Disp: 90 capsule, Rfl: 1    cyclobenzaprine (FLEXERIL) 10 MG tablet, Take 1 tablet by mouth daily as needed for Muscle spasms, Disp: 30 tablet, Rfl: 2    buPROPion (WELLBUTRIN SR) 150 MG extended release tablet, Take 150 mg by mouth daily, Disp: , Rfl:     hydrOXYzine (ATARAX) 25 MG tablet, , Disp: , Rfl:     sertraline (ZOLOFT) 100 MG tablet, , Disp: , Rfl:     risperiDONE (RISPERDAL) 2 MG tablet, Take 3 mg by mouth 2 times daily , Disp: , Rfl:     atorvastatin (LIPITOR) 40 MG tablet, TAKE ONE TABLET BY MOUTH ONE TIME A DAY (Patient taking differently: Take 40 mg by mouth nightly ), Disp: 90 tablet, Rfl: 3    docusate sodium (COLACE) 100 MG capsule, Take 1 capsule by mouth daily as needed for Constipation, Disp: 60 capsule, Rfl: 3    lidocaine (LMX) 4 % cream, Apply topically as needed for Pain Apply topically as needed. , Disp: , Rfl:     acetaminophen (TYLENOL) 325 MG tablet, Take 650 mg by mouth as needed for Pain., Disp: , Rfl:     Family History   Problem Relation Age of Onset    Breast Cancer Mother     Liver Cancer Mother     Other Father         brain cysts       Social History     Socioeconomic History    Marital status:      Spouse name: Not on file    Number of children: Not on file    Years of education: Not on file    Highest education level: Not on file   Occupational History    Not on file   Tobacco Use    Smoking status: Former Smoker     Packs/day: 1.00     Years: 22.00 Pack years: 22.00     Types: Cigarettes    Smokeless tobacco: Never Used    Tobacco comment: 1/2 to 1 ppd   Vaping Use    Vaping Use: Every day    Start date: 2/17/2019    Substances: Nicotine, Flavoring    Devices: Refillable tank   Substance and Sexual Activity    Alcohol use: No     Alcohol/week: 0.0 standard drinks    Drug use: No    Sexual activity: Not on file   Other Topics Concern    Not on file   Social History Narrative    Not on file     Social Determinants of Health     Financial Resource Strain:     Difficulty of Paying Living Expenses: Not on file   Food Insecurity:     Worried About Running Out of Food in the Last Year: Not on file    [place] of Food in the Last Year: Not on file   Transportation Needs:     Lack of Transportation (Medical): Not on file    Lack of Transportation (Non-Medical): Not on file   Physical Activity:     Days of Exercise per Week: Not on file    Minutes of Exercise per Session: Not on file   Stress:     Feeling of Stress : Not on file   Social Connections:     Frequency of Communication with Friends and Family: Not on file    Frequency of Social Gatherings with Friends and Family: Not on file    Attends Judaism Services: Not on file    Active Member of [address] [place] [address] or Organizations: Not on file    Attends Club or Organization Meetings: Not on file    Marital Status: Not on file   Intimate Partner Violence:     Fear of Current or Ex-Partner: Not on file    Emotionally Abused: Not on file    Physically Abused: Not on file    Sexually Abused: Not on file   Housing Stability:     Unable to Pay for Housing in the Last Year: Not on file    Number of [place] in the Last Year: Not on file    Unstable Housing in the Last Year: Not on file       Review of Systems:  Review of Systems   Constitutional: Negative for chills and fever. HENT: Positive for congestion. Negative for sore throat. Cardiovascular: Negative for chest pain.    Respiratory: Negative for cough, shortness of breath and wheezing. Musculoskeletal: Positive for back pain, muscle weakness and stiffness. Gastrointestinal: Negative for constipation. Neurological: Positive for numbness and paresthesias. Negative for dizziness and headaches. Physical Exam:  /78   Pulse 110   Ht 5' 10\" (1.778 m)   Wt 268 lb (121.6 kg)   SpO2 97%   BMI 38.45 kg/m²     Physical Exam  Cardiovascular:      Rate and Rhythm: Normal rate. Pulmonary:      Effort: Pulmonary effort is normal.   Musculoskeletal:      Lumbar back: Decreased range of motion. Skin:     General: Skin is warm and dry. Neurological:      Mental Status: He is alert and oriented to person, place, and time. Assessment:  Problem List Items Addressed This Visit     Lumbosacral spondylosis without myelopathy (Chronic)    Relevant Medications    traMADol (ULTRAM) 50 MG tablet (Start on 3/19/2022)    gabapentin (NEURONTIN) 300 MG capsule    cyclobenzaprine (FLEXERIL) 10 MG tablet    Chronic back pain    Relevant Medications    traMADol (ULTRAM) 50 MG tablet (Start on 3/19/2022)    gabapentin (NEURONTIN) 300 MG capsule    cyclobenzaprine (FLEXERIL) 10 MG tablet      Other Visit Diagnoses     Chronic use of opiate for therapeutic purpose    -  Primary             Treatment Plan:  Patient relates current medications are helping the pain. Patient reports taking pain medications as prescribed, denies obtaining medications from different sources and denies use of illegal drugs. Patient denies side effects from medications like nausea, vomiting, constipation or drowsiness. Patient reports current activities of daily living are possible due to medications and would like to continue them. As always, we encourage daily stretching and strengthening exercises, and recommend minimizing use of pain medications unless patient cannot get through daily activities due to pain. Contract requirements met. Continue opioid therapy. Script written for tramadol and gabapentin  bilat Radiofrequency ablation of median branches at the Transverse processes of L3 / L4 / L5 and S1 denied  ROSSI obtained today for monitoring purposes.  Last dose of medication was today  Follow up appointment made for 4 weeks

## 2022-03-23 ENCOUNTER — TELEPHONE (OUTPATIENT)
Dept: PAIN MANAGEMENT | Age: 47
End: 2022-03-23

## 2022-03-23 NOTE — TELEPHONE ENCOUNTER
Eliz Garibay called back this case is an [employer] case [id number]    called Eliz Garibay [phone] so set up a peer to peer per Dr Jazmyn Allison had to leave message and it states that they will be sending a confirmation to office.

## 2022-04-07 ENCOUNTER — OFFICE VISIT (OUTPATIENT)
Dept: PAIN MANAGEMENT | Age: 47
End: 2022-04-07
Payer: COMMERCIAL

## 2022-04-07 VITALS
HEIGHT: 70 IN | BODY MASS INDEX: 38.82 KG/M2 | SYSTOLIC BLOOD PRESSURE: 119 MMHG | DIASTOLIC BLOOD PRESSURE: 80 MMHG | OXYGEN SATURATION: 94 % | HEART RATE: 97 BPM | WEIGHT: 271.2 LBS

## 2022-04-07 DIAGNOSIS — M54.50 CHRONIC BILATERAL LOW BACK PAIN WITHOUT SCIATICA: ICD-10-CM

## 2022-04-07 DIAGNOSIS — Z79.891 CHRONIC USE OF OPIATE DRUG FOR THERAPEUTIC PURPOSE: ICD-10-CM

## 2022-04-07 DIAGNOSIS — G89.29 CHRONIC BILATERAL LOW BACK PAIN WITHOUT SCIATICA: ICD-10-CM

## 2022-04-07 DIAGNOSIS — M47.817 LUMBOSACRAL SPONDYLOSIS WITHOUT MYELOPATHY: Primary | Chronic | ICD-10-CM

## 2022-04-07 DIAGNOSIS — M51.36 DDD (DEGENERATIVE DISC DISEASE), LUMBAR: ICD-10-CM

## 2022-04-07 PROCEDURE — 99214 OFFICE O/P EST MOD 30 MIN: CPT | Performed by: ANESTHESIOLOGY

## 2022-04-07 RX ORDER — GABAPENTIN 300 MG/1
300 CAPSULE ORAL EVERY 8 HOURS SCHEDULED
Qty: 90 CAPSULE | Refills: 1 | Status: SHIPPED | OUTPATIENT
Start: 2022-04-07 | End: 2022-05-25 | Stop reason: SDUPTHER

## 2022-04-07 RX ORDER — RISPERIDONE 3 MG/1
TABLET, FILM COATED ORAL
COMMUNITY
Start: 2022-01-22

## 2022-04-07 RX ORDER — TRAMADOL HYDROCHLORIDE 50 MG/1
50 TABLET ORAL EVERY 8 HOURS PRN
Qty: 90 TABLET | Refills: 1 | Status: SHIPPED | OUTPATIENT
Start: 2022-04-16 | End: 2022-06-12 | Stop reason: SDUPTHER

## 2022-04-07 ASSESSMENT — ENCOUNTER SYMPTOMS
COUGH: 0
WHEEZING: 0
NAUSEA: 0
SHORTNESS OF BREATH: 0
ABDOMINAL PAIN: 0
CHEST TIGHTNESS: 0
DIARRHEA: 0
SINUS PRESSURE: 0
SORE THROAT: 0
BACK PAIN: 1
SINUS PAIN: 0
CONSTIPATION: 1
EYE PAIN: 0
EYE REDNESS: 0

## 2022-04-07 NOTE — PROGRESS NOTES
The patient is a 48 y. o. Non- / non  male. Chief Complaint   Patient presents with    Back Pain    Follow-up        HPI    58-year-old man with history of chronic low back pain  Established patient of our clinic  Pain onset many years ago  Located in the lower lumbar area across midline affect both side  Symptoms progressively worsened over time  He tried physical therapy multiple times in past  Currently on multimodal medication regimen  Pain interfere with quality of life  He had imaging with MRI lumbar spine that showed lumbar spondylosis and facet arthropathy  He was diagnosed with facet mediated pain had lumbar medial branch nerve radiofrequency ablation 1 year ago that provided more than 80% relief lasting for more than 6 months with improved activity tolerance  Pain then gradually returned back to the baseline  Pain is unchanged from previous presentation  Describes it as aching nagging stiffness aggravates with routine activity  Reports significant morning stiffness  Medication Refill: Gabapentin, tramadol    Pain score Today:  5  Adverse effects (Constipation / Nausea / Sedation / sexual Dysfunction / others) : Constipation, occasional dizziness  Mood: good  Sleep pattern and quality: fair  Activity level: good    Pill count Today: 44 tramadol  Last dose taken 04/07/2022 AM  OARRS report reviewed today: yes  ER/Hospitalizations/PCP visit related to pain since last visit:no   Any legal problems e.g. DUI etc.:No  Satisfied with current management: Yes    Opioid Contract:03/17/2022  Last Urine Dug screen dated:07/12/2021    No results found for: LABA1C  No results found for: EAG    Past Medical History, Past Surgical History, Social History, Allergies and Medications reviewed and updated in EPIC as indicated    Family History reviewed and is noncontributory.         Past Medical History:   Diagnosis Date    Back pain, chronic     Bipolar 2 disorder (HCC)     Bronchitis     DDD (degenerative disc disease), lumbar     Depression     Displacement of lumbar intervertebral disc without myelopathy 5/7/2013    Fibula fracture LEFT    History of gastric ulcer     History of suicidal tendencies     Hyperlipidemia     Lumbar pain with radiation down both legs     Lumbar stenosis     Non-union of fracture LEFT ANKLE    Osteoarthritis     Pneumonia     Sleep disturbance     Spider bite     on great toe on left    Spondylosis     Tenosynovitis of ankle LEFT        Past Surgical History:   Procedure Laterality Date    ADENOIDECTOMY      HC INJECT OTHER PERPHRL NERV Bilateral 5/9/2019    INJECTION FACET LUMBAR L4-S1 performed [facility] Luis E Brown [facility]  8/8/13    Duramorph, Celestone 9mg    NERVE BLOCK  4-24-14    duramorph epidural steroid block, celestone 9mg, morphine 1.25mg    NERVE BLOCK  12/19/2014    DURAMORPH - CELESTONE 9 MG- DURAMORPH  1.5MG    NERVE BLOCK  7/23/15    duramorph 1.5mg celestone 9mg    NERVE BLOCK  11/19/15    Tens-Empi Select    NERVE BLOCK  3-10-16    duramorph epidural steroid block duramorph 1.5 mg decadron 7.5 mg    NERVE BLOCK  12/12/2016    duramorph caudal epidural,decadron 7.5 mg, duramorph 1.5 mg    NERVE BLOCK  10/23/2017    duramorph, duramorph 1.5, decadron 10mg    NERVE BLOCK Bilateral 05/02/2019    bilat  medial branch nerve block   marcaine 1/4%    OTHER SURGICAL HISTORY Bilateral 05/09/2019    facet lumbar injection L4-S1    OTHER SURGICAL HISTORY Left 11/11/2019    Nerve Radiofrequency Ablation Left Lumbar Medial Branch L2-L5    PAIN MANAGEMENT PROCEDURE Right 6/18/2020    NERVE RADIOFREQUENCY ABLATION RIGHT SIDED MEDIAL BRANCH   L2-L5 performed by Tatiana Cuellar MD at [address] [place] [address] Left 7/6/2020    LEFT MEDIAL BRANCH L2-L5 NERVE RADIOFREQUENCY ABLATION performed by Tatiana Cuellar MD at [address] [place] [address] Right 2/15/2021    RIGHT MEDIAL BRANCH L3-S1 NERVE RADIOFREQUENCY ABLATION performed by Shawnie Canavan, MD at [address] [facility] Left 3/22/2021    LEFT MEDIAL [address] L3-S1 performed by Shawnie Canavan, MD at [address] [place] [address] Right 10/21/2019    NERVE RADIOFREQUENCY ABLATION RIGHT LUMBAR MEDIAL BRANCH L2-L5 performed by Shawnie Canavan, MD at [address] [place] [address] Left 11/11/2019    NERVE RADIOFREQUENCY ABLATION LEFT LUMBAR [address] [facility] L2-L5 performed by Shawnie Canavan, MD at [place] [address] ENDOSCOPY      x1    URETHRAL STRICTURE DILATATION         Social History     Socioeconomic History    Marital status:      Spouse name: None    Number of children: None    Years of education: None    Highest education level: None   Occupational History    None   Tobacco Use    Smoking status: Former Smoker     Packs/day: 1.00     Years: 22.00     Pack years: 22.00     Types: Cigarettes    Smokeless tobacco: Never Used    Tobacco comment: 1/2 to 1 ppd   Vaping Use    Vaping Use: Every day    Start date: 2/17/2019    Substances: Nicotine, Flavoring    Devices: Refillable tank   Substance and Sexual Activity    Alcohol use: No     Alcohol/week: 0.0 standard drinks    Drug use: No    Sexual activity: None   Other Topics Concern    None   Social History Narrative    None     Social Determinants of Health     Financial Resource Strain:     Difficulty of Paying Living Expenses: Not on file   Food Insecurity:     Worried About Running Out of Food in the Last Year: Not on file    [place] of Food in the Last Year: Not on file   Transportation Needs:     Lack of Transportation (Medical): Not on file    Lack of Transportation (Non-Medical):  Not on file   Physical Activity:     Days of Exercise per Week: Not on file    Minutes of Exercise per Session: Not on file   Stress:     Feeling of Stress : Not on file Social Connections:     Frequency of Communication with Friends and Family: Not on file    Frequency of Social Gatherings with Friends and Family: Not on file    Attends Confucianism Services: Not on file    Active Member of Clubs or Organizations: Not on file    Attends Club or Organization Meetings: Not on file    Marital Status: Not on file   Intimate Partner Violence:     Fear of Current or Ex-Partner: Not on file    Emotionally Abused: Not on file    Physically Abused: Not on file    Sexually Abused: Not on file   Housing Stability:     Unable to Pay for Housing in the Last Year: Not on file    Number of [place] in the Last Year: Not on file    Unstable Housing in the Last Year: Not on file       Family History   Problem Relation Age of Onset    Breast Cancer Mother     Liver Cancer Mother     Other Father         brain cysts       Allergies   Allergen Reactions    Celebrex [Celecoxib] Shortness Of Breath     CHEST PAIN    Penicillins Hives and Nausea And Vomiting       Vitals:    04/07/22 1107   BP: 119/80   Pulse: 97   SpO2: 94%       Current Outpatient Medications   Medication Sig Dispense Refill    [START ON 4/16/2022] traMADol (ULTRAM) 50 MG tablet Take 1 tablet by mouth every 8 hours as needed for Pain for up to 30 days. 90 tablet 1    gabapentin (NEURONTIN) 300 MG capsule Take 1 capsule by mouth every 8 hours for 30 days.  90 capsule 1    risperiDONE (RISPERDAL) 3 MG tablet       cyclobenzaprine (FLEXERIL) 10 MG tablet Take 1 tablet by mouth daily as needed for Muscle spasms 30 tablet 2    buPROPion (WELLBUTRIN SR) 150 MG extended release tablet Take 150 mg by mouth daily      hydrOXYzine (ATARAX) 25 MG tablet       sertraline (ZOLOFT) 100 MG tablet       atorvastatin (LIPITOR) 40 MG tablet TAKE ONE TABLET BY MOUTH ONE TIME A DAY (Patient taking differently: Take 40 mg by mouth nightly ) 90 tablet 3    docusate sodium (COLACE) 100 MG capsule Take 1 capsule by mouth daily as needed for Constipation 60 capsule 3    lidocaine (LMX) 4 % cream Apply topically as needed for Pain Apply topically as needed.  acetaminophen (TYLENOL) 325 MG tablet Take 650 mg by mouth as needed for Pain. No current facility-administered medications for this visit. Review of Systems   Constitutional: Negative for activity change, appetite change, chills and fever. HENT: Negative for congestion, sinus pressure, sinus pain, sneezing and sore throat. Eyes: Negative for pain and redness. Respiratory: Negative for cough, chest tightness, shortness of breath and wheezing. Cardiovascular: Negative for chest pain and leg swelling. Gastrointestinal: Positive for constipation. Negative for abdominal pain, diarrhea and nausea. Endocrine: Negative for cold intolerance and heat intolerance. Genitourinary: Negative for difficulty urinating. Musculoskeletal: Positive for back pain. Negative for neck pain and neck stiffness. Allergic/Immunologic: Negative for food allergies. Neurological: Positive for dizziness. Negative for light-headedness, numbness and headaches. Hematological: Does not bruise/bleed easily. Psychiatric/Behavioral: Negative for agitation, confusion and suicidal ideas. Objective:  General Appearance:  Uncomfortable, in pain, well-appearing and in no acute distress. Vital signs: (most recent): Blood pressure 119/80, pulse 97, height 5' 10\" (1.778 m), weight 271 lb 3.2 oz (123 kg), SpO2 94 %. Vital signs are normal.  No fever. Output: Producing urine and producing stool. HEENT: Normal HEENT exam.    Lungs:  Normal effort and normal respiratory rate. He is not in respiratory distress. Heart: Normal rate. Extremities: Normal range of motion. There is no deformity. Neurological: Patient is alert and oriented to person, place and time. Normal strength. Patient has normal reflexes, normal muscle tone and normal coordination.     Pupils:  Pupils new course of physical therapy    Automated prescription report reviewed  Safe use of medication discussed  Refill ordered    Orders Placed This Encounter   Procedures    FACET JOINT L/S SINGLE    FACET JOINT L/S SINGLE    Ambulatory referral to Physical Therapy      Orders Placed This Encounter   Medications    traMADol (ULTRAM) 50 MG tablet     Sig: Take 1 tablet by mouth every 8 hours as needed for Pain for up to 30 days. Dispense:  90 tablet     Refill:  1     Reduce doses taken as pain becomes manageable    gabapentin (NEURONTIN) 300 MG capsule     Sig: Take 1 capsule by mouth every 8 hours for 30 days. Dispense:  90 capsule     Refill:  1        Controlled Substance Monitoring:    Acute and Chronic Pain Monitoring:   RX Monitoring 4/7/2022   Attestation -   Periodic Controlled Substance Monitoring Possible medication side effects, risk of tolerance/dependence & alternative treatments discussed. ;No signs of potential drug abuse or diversion identified. ;Assessed functional status. ;Obtaining appropriate analgesic effect of treatment. Chronic Pain > 50 MEDD Obtained or confirmed \"Consent for Opioid Use\" on file.    Chronic Pain > 80 MEDD -               Electronically signed by Margorie Crigler, MD on 4/7/2022 at 11:41 AM

## 2022-04-27 ENCOUNTER — HOSPITAL ENCOUNTER (OUTPATIENT)
Dept: PAIN MANAGEMENT | Facility: CLINIC | Age: 47
Discharge: HOME | End: 2022-04-27
Payer: COMMERCIAL

## 2022-04-27 VITALS
BODY MASS INDEX: 38.8 KG/M2 | TEMPERATURE: 98.9 F | OXYGEN SATURATION: 97 % | RESPIRATION RATE: 19 BRPM | SYSTOLIC BLOOD PRESSURE: 143 MMHG | WEIGHT: 271 LBS | HEIGHT: 70 IN | DIASTOLIC BLOOD PRESSURE: 72 MMHG | HEART RATE: 94 BPM

## 2022-04-27 DIAGNOSIS — M47.817 LUMBOSACRAL SPONDYLOSIS WITHOUT MYELOPATHY: Primary | Chronic | ICD-10-CM

## 2022-04-27 DIAGNOSIS — R52 PAIN MANAGEMENT: ICD-10-CM

## 2022-04-27 PROCEDURE — 2500000003 HC RX 250 WO HCPCS: Performed by: ANESTHESIOLOGY

## 2022-04-27 PROCEDURE — 64635 DESTROY LUMB/SAC FACET JNT: CPT | Performed by: ANESTHESIOLOGY

## 2022-04-27 PROCEDURE — 64636 DESTROY L/S FACET JNT ADDL: CPT

## 2022-04-27 PROCEDURE — 64635 DESTROY LUMB/SAC FACET JNT: CPT

## 2022-04-27 PROCEDURE — 64636 DESTROY L/S FACET JNT ADDL: CPT | Performed by: ANESTHESIOLOGY

## 2022-04-27 PROCEDURE — 99152 MOD SED SAME PHYS/QHP 5/>YRS: CPT | Performed by: ANESTHESIOLOGY

## 2022-04-27 PROCEDURE — 6360000002 HC RX W HCPCS: Performed by: ANESTHESIOLOGY

## 2022-04-27 RX ORDER — FENTANYL CITRATE 50 UG/ML
INJECTION, SOLUTION INTRAMUSCULAR; INTRAVENOUS
Status: COMPLETED | OUTPATIENT
Start: 2022-04-27 | End: 2022-04-27

## 2022-04-27 RX ORDER — MIDAZOLAM HYDROCHLORIDE 2 MG/2ML
INJECTION, SOLUTION INTRAMUSCULAR; INTRAVENOUS
Status: COMPLETED | OUTPATIENT
Start: 2022-04-27 | End: 2022-04-27

## 2022-04-27 RX ORDER — LIDOCAINE HYDROCHLORIDE 10 MG/ML
INJECTION, SOLUTION EPIDURAL; INFILTRATION; INTRACAUDAL; PERINEURAL
Status: COMPLETED | OUTPATIENT
Start: 2022-04-27 | End: 2022-04-27

## 2022-04-27 RX ORDER — LIDOCAINE HYDROCHLORIDE 40 MG/ML
INJECTION, SOLUTION RETROBULBAR; TOPICAL
Status: COMPLETED | OUTPATIENT
Start: 2022-04-27 | End: 2022-04-27

## 2022-04-27 RX ADMIN — MIDAZOLAM HYDROCHLORIDE 2 MG: 1 INJECTION, SOLUTION INTRAMUSCULAR; INTRAVENOUS at 13:39

## 2022-04-27 RX ADMIN — LIDOCAINE HYDROCHLORIDE 5 ML: 10 INJECTION, SOLUTION EPIDURAL; INFILTRATION; INTRACAUDAL at 13:39

## 2022-04-27 RX ADMIN — LIDOCAINE HYDROCHLORIDE 5 ML: 40 SOLUTION RETROBULBAR; TOPICAL at 13:44

## 2022-04-27 RX ADMIN — FENTANYL CITRATE 50 MCG: 50 INJECTION INTRAMUSCULAR; INTRAVENOUS at 13:39

## 2022-04-27 ASSESSMENT — PAIN - FUNCTIONAL ASSESSMENT
PAIN_FUNCTIONAL_ASSESSMENT: 0-10
PAIN_FUNCTIONAL_ASSESSMENT: PREVENTS OR INTERFERES WITH ALL ACTIVE AND SOME PASSIVE ACTIVITIES
PAIN_FUNCTIONAL_ASSESSMENT: 0-10

## 2022-04-27 NOTE — OP NOTE
Preoperative Diagnosis: Lumbar spondylosis w/o myelopathy, chronic low back pain  Postoperative Diagnosis: Lumbar spondylosis w/o myelopathy, chronic low back pain  SEDATION: SEE SEDATION NOTE  BLOOD LOSS: NONE    Procedure Performed:  :Radiofrequency ablation of median branches at the Transverse processes of L4, L5 AND ALA for L4/5 AND L5/S1 facet joints on Right under fluoroscopic guidance with IV sedation. t    Procedure:    After starting an IV, the patient was taken to procedure room. The patient was placed in prone position and skin over the back was prepped and draped in sterile manner. Standard monitors were connected and vitals were monitored during the case and they remained stable during the procedure. A meaningful communication was kept up with the patient throughout the procedure. Then using fluoroscopy the junction of the transverse process of the target vertebra with the superior process of the facet joint was observed and the view was optimized. The skin and deep tissues were infiltrated with 2 ml of  1 % lidocaine. The RF canula with the 10 mm active tip was introduced through the skin wheal under fluoroscopy guidance such that the tip of the needle lies in the groove of the transverse process with the superior processes of the facet joint. Then a lateral and AP view of the lumbar spine was obtained to make sure the tip of needle is not in the neural foramen. Then electric impedence was checked to make sure it is acceptable. Then a sensory stimulus was applied at 50 Hz up to 0.5 volt and concordant pain symptoms were reproduced. Then a motor stimulus was applied at 2 Hz up to 2 volts or 3 x times the sensory stimulus and no motor stimulation was seen in lower extremities. Some multifidus stimulus was seen. Then after negative aspiration 1 ml of 4% lidocaine was injected through the needle at each level. The radiofrequency lesion was done at 85 degrees centigrade for 110 seconds. Patient's vital signs and neurological status remained stable throughout the procedure and post procedural period. The patient tolerated the procedure well and was discharged home in stable condition. SEDATION NOTE:    ASA CLASSIFICATION  3  MP   CLASSIFICATION  3    Moderate intravenous conscious sedation was supervised by Dr. Yaniv Salgado  The patient was independently monitored by a Registered Nurse assigned to the Procedure Room  Monitoring included automated blood pressure, continuous EKG, Capnography and continuous pulse oximetry. The detailed Conscious Record is permanently stored in the [facility].      The following is the conscious sedation record;  Start Time:  1335  End times:  1352  Duration:  17 MINUTES  MEDS GIVEN 2 MG VERSED AND 50 MCG FENTANYL

## 2022-04-27 NOTE — H&P
UPDATE:  Office visit pain clinic in [facility] with all required elements of H&P dated 04/07/2022  Patient seen preop, chart reviewed,   No changes in medical history and health assessment since last evaluation. PE:  AAO x 3, in NAD, VSS,. Resp: breathing on RA, no respiratory distress  Cardiac: rate normal    Risk / Benefits explained to patient, patient agree to proceed with plan.   ASA 3  MP 3

## 2022-05-11 ENCOUNTER — HOSPITAL ENCOUNTER (OUTPATIENT)
Dept: PAIN MANAGEMENT | Facility: CLINIC | Age: 47
Discharge: HOME | End: 2022-05-11
Payer: COMMERCIAL

## 2022-05-11 ENCOUNTER — TELEPHONE (OUTPATIENT)
Dept: PAIN MANAGEMENT | Age: 47
End: 2022-05-11

## 2022-05-11 VITALS
HEART RATE: 94 BPM | SYSTOLIC BLOOD PRESSURE: 135 MMHG | RESPIRATION RATE: 14 BRPM | TEMPERATURE: 98.4 F | DIASTOLIC BLOOD PRESSURE: 77 MMHG | OXYGEN SATURATION: 96 %

## 2022-05-11 DIAGNOSIS — M47.817 LUMBOSACRAL SPONDYLOSIS WITHOUT MYELOPATHY: Primary | Chronic | ICD-10-CM

## 2022-05-11 DIAGNOSIS — R52 PAIN MANAGEMENT: ICD-10-CM

## 2022-05-11 PROCEDURE — 64635 DESTROY LUMB/SAC FACET JNT: CPT

## 2022-05-11 PROCEDURE — 64636 DESTROY L/S FACET JNT ADDL: CPT

## 2022-05-11 PROCEDURE — 6360000002 HC RX W HCPCS: Performed by: ANESTHESIOLOGY

## 2022-05-11 PROCEDURE — 64635 DESTROY LUMB/SAC FACET JNT: CPT | Performed by: ANESTHESIOLOGY

## 2022-05-11 PROCEDURE — 99152 MOD SED SAME PHYS/QHP 5/>YRS: CPT | Performed by: ANESTHESIOLOGY

## 2022-05-11 PROCEDURE — 64636 DESTROY L/S FACET JNT ADDL: CPT | Performed by: ANESTHESIOLOGY

## 2022-05-11 PROCEDURE — 2580000003 HC RX 258: Performed by: ANESTHESIOLOGY

## 2022-05-11 PROCEDURE — 2500000003 HC RX 250 WO HCPCS: Performed by: ANESTHESIOLOGY

## 2022-05-11 RX ORDER — LIDOCAINE HYDROCHLORIDE 40 MG/ML
INJECTION, SOLUTION RETROBULBAR; TOPICAL
Status: COMPLETED | OUTPATIENT
Start: 2022-05-11 | End: 2022-05-11

## 2022-05-11 RX ORDER — FENTANYL CITRATE 50 UG/ML
INJECTION, SOLUTION INTRAMUSCULAR; INTRAVENOUS
Status: COMPLETED | OUTPATIENT
Start: 2022-05-11 | End: 2022-05-11

## 2022-05-11 RX ORDER — SODIUM CHLORIDE 0.9 % (FLUSH) 0.9 %
SYRINGE (ML) INJECTION
Status: COMPLETED | OUTPATIENT
Start: 2022-05-11 | End: 2022-05-11

## 2022-05-11 RX ORDER — MIDAZOLAM HYDROCHLORIDE 2 MG/2ML
INJECTION, SOLUTION INTRAMUSCULAR; INTRAVENOUS
Status: COMPLETED | OUTPATIENT
Start: 2022-05-11 | End: 2022-05-11

## 2022-05-11 RX ORDER — LIDOCAINE HYDROCHLORIDE 10 MG/ML
INJECTION, SOLUTION EPIDURAL; INFILTRATION; INTRACAUDAL; PERINEURAL
Status: COMPLETED | OUTPATIENT
Start: 2022-05-11 | End: 2022-05-11

## 2022-05-11 RX ADMIN — LIDOCAINE HYDROCHLORIDE 3 ML: 40 SOLUTION RETROBULBAR; TOPICAL at 10:26

## 2022-05-11 RX ADMIN — MIDAZOLAM HYDROCHLORIDE 2 MG: 1 INJECTION, SOLUTION INTRAMUSCULAR; INTRAVENOUS at 10:21

## 2022-05-11 RX ADMIN — Medication 3 ML: at 10:22

## 2022-05-11 RX ADMIN — LIDOCAINE HYDROCHLORIDE 5 ML: 10 INJECTION, SOLUTION EPIDURAL; INFILTRATION; INTRACAUDAL at 10:22

## 2022-05-11 RX ADMIN — FENTANYL CITRATE 50 MCG: 50 INJECTION INTRAMUSCULAR; INTRAVENOUS at 10:21

## 2022-05-11 ASSESSMENT — PAIN DESCRIPTION - DESCRIPTORS: DESCRIPTORS: SHARP;ACHING;STABBING

## 2022-05-11 ASSESSMENT — PAIN - FUNCTIONAL ASSESSMENT
PAIN_FUNCTIONAL_ASSESSMENT: PREVENTS OR INTERFERES SOME ACTIVE ACTIVITIES AND ADLS
PAIN_FUNCTIONAL_ASSESSMENT: NONE - DENIES PAIN
PAIN_FUNCTIONAL_ASSESSMENT: 0-10

## 2022-05-11 NOTE — H&P
Pain Pre-Op H&P Note    Tayler Gonzales MD    HPI: Draek Zavaleta  presents with   Chronic axial lower lumbar [psinal pain  Responded well in past to MBB RFA     Past Medical History:   Diagnosis Date    Back pain, chronic     Bipolar 2 disorder ([address]     Bronchitis     DDD (degenerative disc disease), lumbar     Depression     Displacement of lumbar intervertebral disc without myelopathy 5/7/2013    Fibula fracture LEFT    History of gastric ulcer     History of suicidal tendencies     Hyperlipidemia     Lumbar pain with radiation down both legs     Lumbar stenosis     Non-union of fracture LEFT ANKLE    Osteoarthritis     Pneumonia     Sleep disturbance     Spider bite     on great toe on left    Spondylosis     Tenosynovitis of ankle LEFT       Past Surgical History:   Procedure Laterality Date    ADENOIDECTOMY      HC INJECT OTHER PERPHRL NERV Bilateral 5/9/2019    INJECTION FACET LUMBAR L4-S1 performed by Katerin Damico MD at [facility]  8/8/13    Duramorph, Celestone 9mg    NERVE BLOCK  4-24-14    duramorph epidural steroid block, celestone 9mg, morphine 1.25mg    NERVE BLOCK  12/19/2014    DURAMORPH - CELESTONE 9 MG- DURAMORPH  1.5MG    NERVE BLOCK  7/23/15    duramorph 1.5mg celestone 9mg    NERVE BLOCK  11/19/15    Tens-Empi Select    NERVE BLOCK  3-10-16    duramorph epidural steroid block duramorph 1.5 mg decadron 7.5 mg    NERVE BLOCK  12/12/2016    duramorph caudal epidural,decadron 7.5 mg, duramorph 1.5 mg    NERVE BLOCK  10/23/2017    duramorph, duramorph 1.5, decadron 10mg    NERVE BLOCK Bilateral 05/02/2019    bilat  medial branch nerve block   marcaine 1/4%    OTHER SURGICAL HISTORY Bilateral 05/09/2019    facet lumbar injection L4-S1    OTHER SURGICAL HISTORY Left 11/11/2019    Nerve Radiofrequency Ablation Left Lumbar Medial Branch L2-L5    PAIN MANAGEMENT PROCEDURE Right 6/18/2020    NERVE RADIOFREQUENCY ABLATION RIGHT SIDED MEDIAL BRANCH   L2-L5 performed by Madelyn Tavarez MD at [place] [address] Left 7/6/2020    LEFT MEDIAL BRANCH L2-L5 NERVE RADIOFREQUENCY ABLATION performed by Madelyn Tavarez MD at [place] [address] Right 2/15/2021    RIGHT MEDIAL BRANCH L3-S1 NERVE RADIOFREQUENCY ABLATION performed by Madelyn Tavarez MD at [place] [address] Left 3/22/2021    LEFT MEDIAL [address] [facility] L3-S1 performed by Madelyn Tavarez MD at [address] [facility] Right 10/21/2019    NERVE RADIOFREQUENCY ABLATION RIGHT LUMBAR MEDIAL BRANCH L2-L5 performed by Madelyn Tavarez MD at [address] [place] [address] Left 11/11/2019    NERVE RADIOFREQUENCY ABLATION LEFT LUMBAR [place] L2-L5 performed by Madelyn Tavarez MD at [place] [address] ENDOSCOPY      x1    URETHRAL STRICTURE DILATATION         Family History   Problem Relation Age of Onset    Breast Cancer Mother     Liver Cancer Mother     Other Father         brain cysts       Allergies   Allergen Reactions    Celebrex [Celecoxib] Shortness Of Breath     CHEST PAIN    Penicillins Hives and Nausea And Vomiting         Current Outpatient Medications:     risperiDONE (RISPERDAL) 3 MG tablet, , Disp: , Rfl:     traMADol (ULTRAM) 50 MG tablet, Take 1 tablet by mouth every 8 hours as needed for Pain for up to 30 days. , Disp: 90 tablet, Rfl: 1    gabapentin (NEURONTIN) 300 MG capsule, Take 1 capsule by mouth every 8 hours for 30 days. , Disp: 90 capsule, Rfl: 1    buPROPion (WELLBUTRIN SR) 150 MG extended release tablet, Take 150 mg by mouth daily, Disp: , Rfl:     hydrOXYzine (ATARAX) 25 MG tablet, , Disp: , Rfl:     sertraline (ZOLOFT) 100 MG tablet, , Disp: , Rfl:     atorvastatin (LIPITOR) 40 MG tablet, TAKE ONE TABLET BY MOUTH ONE TIME A DAY (Patient taking differently: Take 40 mg by mouth nightly ), Disp: 90 tablet, Rfl: 3   docusate sodium (COLACE) 100 MG capsule, Take 1 capsule by mouth daily as needed for Constipation, Disp: 60 capsule, Rfl: 3    lidocaine (LMX) 4 % cream, Apply topically as needed for Pain Apply topically as needed. , Disp: , Rfl:     acetaminophen (TYLENOL) 325 MG tablet, Take 650 mg by mouth as needed for Pain., Disp: , Rfl:     Social History     Tobacco Use    Smoking status: Former Smoker     Packs/day: 1.00     Years: 22.00     Pack years: 22.00     Types: Cigarettes    Smokeless tobacco: Never Used    Tobacco comment: 1/2 to 1 ppd   Substance Use Topics    Alcohol use: No     Alcohol/week: 0.0 standard drinks       Review of Systems:   Focused review of systems was performed, and negative as pertinent to diagnosis, except as stated in HPI. Physical Exam  Constitutional:       Appearance: Normal appearance. Pulmonary:      Effort: Pulmonary effort is normal.   Neurological:      Mental Status: alert. Psychiatric:         Attention and Perception: Attention and perception normal.         Mood and Affect: Mood and affect normal.   Cardiovascular:      Rate: Normal rate. ASA: 3          Mallampati: 3       Patient's current physical status, medications, medical history, and HPI have been reviewed and updated as appropriate on this date: 05/11/22    Risk/Benefit(s): The risks, benefits, alternatives, and potential complications have been discussed with the patient/family and informed consent has been obtained for the procedure/sedation. Diagnosis:   1.  Lumbosacral spondylosis without myelopathy            Plan:   Left lumbar MBN RFA at L4/5 and L5/S1 facets        Kimberly Sol MD

## 2022-05-11 NOTE — TELEPHONE ENCOUNTER
Pt called to cancel his procedure appt today, due to transportation. Writer unable to reach clinical staff. Please contact pt to reschedule.

## 2022-05-11 NOTE — OP NOTE
Preoperative Diagnosis: Lumbar spondylosis w/o myelopathy, chronic low back pain  Postoperative Diagnosis: Lumbar spondylosis w/o myelopathy, chronic low back pain  SEDATION: SEE SEDATION NOTE  BLOOD LOSS: NONE    Procedure Performed:  :Radiofrequency ablation of median branches at the Transverse processes of L4, L5 and SACRAL ALA for L4/5 AND L5/S1 facet joints on Left under fluoroscopic guidance with IV sedation. t    Procedure:    After starting an IV, the patient was taken to procedure room. The patient was placed in prone position and skin over the back was prepped and draped in sterile manner. Standard monitors were connected and vitals were monitored during the case and they remained stable during the procedure. A meaningful communication was kept up with the patient throughout the procedure. Then using fluoroscopy the junction of the transverse process of the target vertebra with the superior process of the facet joint was observed and the view was optimized. The skin and deep tissues were infiltrated with 2 ml of  1 % lidocaine. The RF canula with the 10 mm active tip was introduced through the skin wheal under fluoroscopy guidance such that the tip of the needle lies in the groove of the transverse process with the superior processes of the facet joint. Then a lateral and AP view of the lumbar spine was obtained to make sure the tip of needle is not in the neural foramen. Then electric impedence was checked to make sure it is acceptable. Then a sensory stimulus was applied at 50 Hz up to 0.5 volt and concordant pain symptoms were reproduced. Then a motor stimulus was applied at 2 Hz up to 2 volts or 3 x times the sensory stimulus and no motor stimulation was seen in lower extremities. Some multifidus stimulus was seen. Then after negative aspiration 1 ml of 4% lidocaine was injected through the needle at each level. The radiofrequency lesion was done at 85 degrees centigrade for 110 seconds. Patient's vital signs and neurological status remained stable throughout the procedure and post procedural period. The patient tolerated the procedure well and was discharged home in stable condition. SEDATION NOTE:    ASA CLASSIFICATION  3  MP   CLASSIFICATION  3    Moderate intravenous conscious sedation was supervised by Dr. Fidelina Rodriguez  The patient was independently monitored by a Registered Nurse assigned to the Procedure Room  Monitoring included automated blood pressure, continuous EKG, Capnography and continuous pulse oximetry. The detailed Conscious Record is permanently stored in the [facility] [place] [id number].      The following is the conscious sedation record;  Start Time:  1019  End times:  1032  Duration:  13 MINUTES  MEDS GIVEN 2 MG VERSED AND 50 MCG FENTANYL

## 2022-05-25 ENCOUNTER — OFFICE VISIT (OUTPATIENT)
Dept: PAIN MANAGEMENT | Age: 47
End: 2022-05-25
Payer: COMMERCIAL

## 2022-05-25 VITALS
DIASTOLIC BLOOD PRESSURE: 75 MMHG | HEIGHT: 70 IN | BODY MASS INDEX: 38.85 KG/M2 | WEIGHT: 271.4 LBS | OXYGEN SATURATION: 96 % | SYSTOLIC BLOOD PRESSURE: 138 MMHG | HEART RATE: 97 BPM

## 2022-05-25 DIAGNOSIS — G89.29 CHRONIC BILATERAL LOW BACK PAIN WITHOUT SCIATICA: Primary | ICD-10-CM

## 2022-05-25 DIAGNOSIS — M47.817 LUMBOSACRAL SPONDYLOSIS WITHOUT MYELOPATHY: Chronic | ICD-10-CM

## 2022-05-25 DIAGNOSIS — M51.36 DDD (DEGENERATIVE DISC DISEASE), LUMBAR: ICD-10-CM

## 2022-05-25 DIAGNOSIS — Z79.891 CHRONIC USE OF OPIATE FOR THERAPEUTIC PURPOSE: ICD-10-CM

## 2022-05-25 DIAGNOSIS — M54.50 CHRONIC BILATERAL LOW BACK PAIN WITHOUT SCIATICA: Primary | ICD-10-CM

## 2022-05-25 PROCEDURE — 99213 OFFICE O/P EST LOW 20 MIN: CPT | Performed by: NURSE PRACTITIONER

## 2022-05-25 RX ORDER — TRAMADOL HYDROCHLORIDE 50 MG/1
50 TABLET ORAL EVERY 8 HOURS PRN
Qty: 90 TABLET | Refills: 1 | Status: CANCELLED | OUTPATIENT
Start: 2022-05-25 | End: 2022-06-24

## 2022-05-25 RX ORDER — GABAPENTIN 300 MG/1
300 CAPSULE ORAL EVERY 8 HOURS SCHEDULED
Qty: 90 CAPSULE | Refills: 2 | Status: SHIPPED | OUTPATIENT
Start: 2022-05-25 | End: 2022-08-08 | Stop reason: SDUPTHER

## 2022-05-25 ASSESSMENT — ENCOUNTER SYMPTOMS
BACK PAIN: 1
VOMITING: 0
COUGH: 0
WHEEZING: 0
DIARRHEA: 0
NAUSEA: 0
SHORTNESS OF BREATH: 0
CONSTIPATION: 0

## 2022-05-25 NOTE — PROGRESS NOTES
Chief Complaint   Patient presents with    Medication Refill     gabapentin, flexeril , ultram    Back Pain         [facility]     Patient complains of low back pain since [id number] with no known injury. He had imaging with 2017 MRI lumbar spine that showed lumbar spondylosis and facet arthropathy In past he had lumbar steroid injection but steroids were discontinued because of psychiatric issue. Ceci Maloney is also using TENS unit and OTC lidocaine cream with good relief. Patient states tramadol and RFA is managing his pain and helping him stay active.     Procedures  4/22 and 5/22  bilat Radiofrequency ablation of median branches at the Transverse processes of Transverse processes of L4, L5 and SACRAL ALA for L4/5 AND L5/S1 facet 85% relief reported    HPI:     Back Pain  This is a chronic problem. The problem occurs intermittently. The problem has been gradually improving since onset. The pain is present in the lumbar spine. The quality of the pain is described as aching. The pain is at a severity of 2/10. The symptoms are aggravated by standing, position and sitting. Associated symptoms include headaches. Pertinent negatives include no chest pain, fever, numbness or paresthesias. Risk factors include obesity, poor posture, lack of exercise and sedentary lifestyle. He has tried analgesics, heat, home exercises and ice (RFAs) for the symptoms. The treatment provided moderate relief.      Medication Refill:     Pain score Today:  2  Adverse effects (Constipation / Nausea / Sedation / sexual Dysfunction / others) : Dizziness and constipation   Mood: fair  Sleep pattern and quality: good  Activity level: good    Pill count Today: Ultram # 74 not due until 6/17  Last dose taken  05/25/2022  OARRS report reviewed today: yes  Morphine equivalent: 15  ER/Hospitalizations/PCP visit related to pain since last visit:no   Any legal problems e.g. DUI etc.:No  Satisfied with current management: Yes    Opioid Contract:03/17/2022  Last Urine Dug screen dated:03/22/2022    No results found for: LABA1C  No results found for: EAG    Past Medical History, Past Surgical History, Social History, Allergies and Medications reviewed and updated in EPIC as indicated    Family History reviewed and is noncontributory. Controlled Substance Monitoring:    Acute and Chronic Pain Monitoring:   RX Monitoring 5/25/2022   Attestation -   Periodic Controlled Substance Monitoring Possible medication side effects, risk of tolerance/dependence & alternative treatments discussed. ;No signs of potential drug abuse or diversion identified. ;Assessed functional status. ;Obtaining appropriate analgesic effect of treatment. Chronic Pain > 50 MEDD -   Chronic Pain > 80 MEDD -             Periodic Controlled Substance Monitoring: Possible medication side effects, risk of tolerance/dependence & alternative treatments discussed. ,No signs of potential drug abuse or diversion identified. ,Assessed functional status. ,Obtaining appropriate analgesic effect of treatment.  Malcolm Stafford, BEHZAD - CNP)      Past Medical History:   Diagnosis Date    Back pain, chronic     Bipolar 2 disorder (HCC)     Bronchitis     DDD (degenerative disc disease), lumbar     Depression     Displacement of lumbar intervertebral disc without myelopathy 5/7/2013    Fibula fracture LEFT    History of gastric ulcer     History of suicidal tendencies     Hyperlipidemia     Lumbar pain with radiation down both legs     Lumbar stenosis     Non-union of fracture LEFT ANKLE    Osteoarthritis     Pneumonia     Sleep disturbance     Spider bite     on great toe on left    Spondylosis     Tenosynovitis of ankle LEFT       Past Surgical History:   Procedure Laterality Date    ADENOIDECTOMY      HC INJECT OTHER PERPHRL NERV Bilateral 5/9/2019    INJECTION FACET LUMBAR L4-S1 performed by Lissett Robertson MD at [facility]  8/8/13    Cesar Whitney 9mg    NERVE BLOCK  4-24-14    duramorph epidural steroid block, celestone 9mg, morphine 1.25mg    NERVE BLOCK  12/19/2014    DURAMORPH - CELESTONE 9 MG- DURAMORPH  1.5MG    NERVE BLOCK  7/23/15    duramorph 1.5mg celestone 9mg    NERVE BLOCK  11/19/15    Tens-Empi Select    NERVE BLOCK  3-10-16    duramorph epidural steroid block duramorph 1.5 mg decadron 7.5 mg    NERVE BLOCK  12/12/2016    duramorph caudal epidural,decadron 7.5 mg, duramorph 1.5 mg    NERVE BLOCK  10/23/2017    duramorph, duramorph 1.5, decadron 10mg    NERVE BLOCK Bilateral 05/02/2019    bilat  medial branch nerve block   marcaine 1/4%    OTHER SURGICAL HISTORY Bilateral 05/09/2019    facet lumbar injection L4-S1    OTHER SURGICAL HISTORY Left 11/11/2019    Nerve Radiofrequency Ablation Left Lumbar Medial Branch L2-L5    PAIN MANAGEMENT PROCEDURE Right 6/18/2020    NERVE RADIOFREQUENCY ABLATION RIGHT SIDED MEDIAL BRANCH   L2-L5 performed by Margorie Crigler, MD at [address] [place] [address] Left 7/6/2020    LEFT MEDIAL BRANCH L2-L5 NERVE RADIOFREQUENCY ABLATION performed by Margorie Crigler, MD at [address] [place] [address] Right 2/15/2021    RIGHT MEDIAL BRANCH L3-S1 NERVE RADIOFREQUENCY ABLATION performed by Margorie Crigler, MD at [address] [place] [address] Left 3/22/2021    LEFT MEDIAL [facility] L3-S1 performed by Margorie Crigler, MD at [address] [place] [address] Right 10/21/2019    NERVE RADIOFREQUENCY ABLATION RIGHT LUMBAR MEDIAL BRANCH L2-L5 performed by Margorie Crigler, MD at [address] [place] [address] Left 11/11/2019    NERVE RADIOFREQUENCY ABLATION LEFT LUMBAR [facility] L2-L5 performed by Margorie Crigler, MD at [place] [address] ENDOSCOPY      x1    URETHRAL STRICTURE DILATATION         Allergies   Allergen Reactions    Celebrex [Celecoxib] Shortness Of Breath     CHEST PAIN    Penicillins Hives and Nausea And Vomiting         Current Outpatient Medications:     risperiDONE (RISPERDAL) 3 MG tablet, , Disp: , Rfl:     traMADol (ULTRAM) 50 MG tablet, Take 1 tablet by mouth every 8 hours as needed for Pain for up to 30 days. , Disp: 90 tablet, Rfl: 1    gabapentin (NEURONTIN) 300 MG capsule, Take 1 capsule by mouth every 8 hours for 30 days. , Disp: 90 capsule, Rfl: 1    buPROPion (WELLBUTRIN SR) 150 MG extended release tablet, Take 150 mg by mouth daily, Disp: , Rfl:     hydrOXYzine (ATARAX) 25 MG tablet, , Disp: , Rfl:     sertraline (ZOLOFT) 100 MG tablet, , Disp: , Rfl:     atorvastatin (LIPITOR) 40 MG tablet, TAKE ONE TABLET BY MOUTH ONE TIME A DAY (Patient taking differently: Take 40 mg by mouth nightly ), Disp: 90 tablet, Rfl: 3    docusate sodium (COLACE) 100 MG capsule, Take 1 capsule by mouth daily as needed for Constipation, Disp: 60 capsule, Rfl: 3    lidocaine (LMX) 4 % cream, Apply topically as needed for Pain Apply topically as needed. , Disp: , Rfl:     acetaminophen (TYLENOL) 325 MG tablet, Take 650 mg by mouth as needed for Pain., Disp: , Rfl:     Family History   Problem Relation Age of Onset    Breast Cancer Mother     Liver Cancer Mother     Other Father         brain cysts       Social History     Socioeconomic History    Marital status:      Spouse name: Not on file    Number of children: Not on file    Years of education: Not on file    Highest education level: Not on file   Occupational History    Not on file   Tobacco Use    Smoking status: Former Smoker     Packs/day: 1.00     Years: 22.00     Pack years: 22.00     Types: Cigarettes    Smokeless tobacco: Never Used    Tobacco comment: 1/2 to 1 ppd   Vaping Use    Vaping Use: Every day    Start date: 2/17/2019    Substances: Nicotine, Flavoring    Devices: Refillable tank   Substance and Sexual Activity    Alcohol use: No     Alcohol/week: 0.0 standard drinks    Drug use: No    Sexual activity: Not on file   Other Topics Concern    Not on file   Social History Narrative    Not on file     Social Determinants of Health     Financial Resource Strain:     Difficulty of Paying Living Expenses: Not on file   Food Insecurity:     Worried About [address] in the Last Year: Not on file    [place] of Food in the Last Year: Not on file   Transportation Needs:     Lack of Transportation (Medical): Not on file    Lack of Transportation (Non-Medical): Not on file   Physical Activity:     Days of Exercise per Week: Not on file    Minutes of Exercise per Session: Not on file   Stress:     Feeling of Stress : Not on file   Social Connections:     Frequency of Communication with Friends and Family: Not on file    Frequency of Social Gatherings with Friends and Family: Not on file    Attends Mandaeism Services: Not on file    Active Member of [address] [place] [address] or Organizations: Not on file    Attends Club or Organization Meetings: Not on file    Marital Status: Not on file   Intimate Partner Violence:     Fear of Current or Ex-Partner: Not on file    Emotionally Abused: Not on file    Physically Abused: Not on file    Sexually Abused: Not on file   Housing Stability:     Unable to Pay for Housing in the Last Year: Not on file    Number of [place] in the Last Year: Not on file    Unstable Housing in the Last Year: Not on file       Review of Systems:  Review of Systems   Constitutional: Negative for chills, fever and malaise/fatigue. Cardiovascular: Negative for chest pain. Respiratory: Negative for cough, shortness of breath and wheezing. Musculoskeletal: Positive for back pain, muscle cramps and muscle weakness. Negative for falls, neck pain and stiffness. Gastrointestinal: Negative for constipation, diarrhea, nausea and vomiting. Neurological: Positive for dizziness and headaches. Negative for numbness, paresthesias and tremors.        Physical Exam:  /75   Pulse 97   Ht 5' 10\" (1.778 m)   Wt 271 lb 6.4 oz (123.1 kg)   SpO2 96%   BMI 38.94 kg/m²     Physical Exam  Cardiovascular:      Rate and Rhythm: Normal rate. Pulmonary:      Effort: Pulmonary effort is normal.   Musculoskeletal:         General: Normal range of motion. Skin:     General: Skin is warm and dry. Neurological:      Mental Status: He is alert and oriented to person, place, and time. Assessment:  Problem List Items Addressed This Visit     Lumbosacral spondylosis without myelopathy (Chronic)    Chronic back pain - Primary    DDD (degenerative disc disease), lumbar      Other Visit Diagnoses     Chronic use of opiate for therapeutic purpose                 Treatment Plan:  Patient relates current medications are helping the pain. Patient reports taking pain medications as prescribed, denies obtaining medications from different sources and denies use of illegal drugs. Patient denies side effects from medications like nausea, vomiting, constipation or drowsiness. Patient reports current activities of daily living are possible due to medications and would like to continue them. As always, we encourage daily stretching and strengthening exercises, and recommend minimizing use of pain medications unless patient cannot get through daily activities due to pain. Contract requirements met. Continue opioid therapy. Pt to call for refill due 6/17  Follow up appointment made for 8 weeks    I have reviewed the chief complaint and history of present illness (including ROS and [facility]) and vital documentation by my staff and I agree with their documentation and have added where applicable.

## 2022-06-12 DIAGNOSIS — G89.29 CHRONIC BILATERAL LOW BACK PAIN WITHOUT SCIATICA: ICD-10-CM

## 2022-06-12 DIAGNOSIS — M47.817 LUMBOSACRAL SPONDYLOSIS WITHOUT MYELOPATHY: Chronic | ICD-10-CM

## 2022-06-12 DIAGNOSIS — M54.50 CHRONIC BILATERAL LOW BACK PAIN WITHOUT SCIATICA: ICD-10-CM

## 2022-06-13 RX ORDER — TRAMADOL HYDROCHLORIDE 50 MG/1
50 TABLET ORAL EVERY 8 HOURS PRN
Qty: 90 TABLET | Refills: 1 | Status: SHIPPED | OUTPATIENT
Start: 2022-06-17 | End: 2022-08-08 | Stop reason: SDUPTHER

## 2022-08-08 ENCOUNTER — OFFICE VISIT (OUTPATIENT)
Dept: PAIN MANAGEMENT | Age: 47
End: 2022-08-08
Payer: COMMERCIAL

## 2022-08-08 VITALS
DIASTOLIC BLOOD PRESSURE: 69 MMHG | WEIGHT: 270 LBS | OXYGEN SATURATION: 97 % | BODY MASS INDEX: 38.65 KG/M2 | SYSTOLIC BLOOD PRESSURE: 111 MMHG | HEIGHT: 70 IN | HEART RATE: 90 BPM

## 2022-08-08 DIAGNOSIS — Z79.891 CHRONIC USE OF OPIATE FOR THERAPEUTIC PURPOSE: ICD-10-CM

## 2022-08-08 DIAGNOSIS — G89.29 CHRONIC BILATERAL LOW BACK PAIN WITHOUT SCIATICA: Primary | ICD-10-CM

## 2022-08-08 DIAGNOSIS — M54.50 CHRONIC BILATERAL LOW BACK PAIN WITHOUT SCIATICA: Primary | ICD-10-CM

## 2022-08-08 DIAGNOSIS — M47.817 LUMBOSACRAL SPONDYLOSIS WITHOUT MYELOPATHY: Chronic | ICD-10-CM

## 2022-08-08 PROCEDURE — 99213 OFFICE O/P EST LOW 20 MIN: CPT | Performed by: NURSE PRACTITIONER

## 2022-08-08 RX ORDER — CYCLOBENZAPRINE HCL 10 MG
10 TABLET ORAL DAILY PRN
Qty: 30 TABLET | Refills: 2 | Status: SHIPPED | OUTPATIENT
Start: 2022-08-08 | End: 2022-09-07

## 2022-08-08 RX ORDER — GABAPENTIN 300 MG/1
300 CAPSULE ORAL EVERY 8 HOURS SCHEDULED
Qty: 90 CAPSULE | Refills: 2 | Status: SHIPPED | OUTPATIENT
Start: 2022-08-08 | End: 2022-10-07 | Stop reason: SDUPTHER

## 2022-08-08 RX ORDER — TRAMADOL HYDROCHLORIDE 50 MG/1
50 TABLET ORAL EVERY 8 HOURS PRN
Qty: 90 TABLET | Refills: 1 | Status: SHIPPED | OUTPATIENT
Start: 2022-08-13 | End: 2022-10-07 | Stop reason: SDUPTHER

## 2022-08-08 ASSESSMENT — ENCOUNTER SYMPTOMS
DIARRHEA: 0
COUGH: 0
VOMITING: 0
NAUSEA: 0
BACK PAIN: 1
SHORTNESS OF BREATH: 0
WHEEZING: 0
CONSTIPATION: 0

## 2022-08-08 NOTE — PROGRESS NOTES
Chief Complaint   Patient presents with    Back Pain    Medication Refill     Gabapentin, tramadol          [facility]     Patient complains of low back pain since [id number] with no known injury. He had imaging with 2017 MRI lumbar spine that showed lumbar spondylosis and facet arthropathy In past he had lumbar steroid injection but steroids were discontinued because of psychiatric issue. He is also using TENS unit and OTC lidocaine cream with good relief. Patient states tramadol and RFA is managing his pain and helping him stay active. Procedures  4/22 and 5/22  bilat Radiofrequency ablation of median branches at the Transverse processes of Transverse processes of L4, L5 and SACRAL ALA for L4/5 AND L5/S1 facet 85% relief reported    HPI:   Back Pain  This is a chronic problem. The current episode started more than 1 year ago. The problem is unchanged. The pain is present in the lumbar spine. The quality of the pain is described as aching. The pain does not radiate. The pain is at a severity of 2/10. The pain is mild. The symptoms are aggravated by position. Pertinent negatives include no chest pain, fever, headaches, numbness or weakness. Risk factors include obesity and poor posture. He has tried analgesics for the symptoms. The treatment provided mild relief.       Medication Refill: Tramadol, Gabapentin     Pain score Today:  2  Adverse effects (Constipation / Nausea / Sedation / sexual Dysfunction / others) : no  Mood: good  Sleep pattern and quality: poor  Activity level: good    Pill count Today: Tramadol # 16 8/13  Last dose taken  08/08/2022  OARRS report reviewed today: yes  Morphine equivalent: 15  ER/Hospitalizations/PCP visit related to pain since last visit:no   Any legal problems e.g. DUI etc.:No  Satisfied with current management: Yes    Opioid Contract:03/17/2022  Last Urine Dug screen dated:03/22/2022    No results found for: LABA1C  No results found for: EAG    Past Medical History, Past NERVE BLOCK  7/23/15    duramorph 1.5mg celestone 9mg    NERVE BLOCK  11/19/15    Tens-Empi Select    NERVE BLOCK  3-10-16    duramorph epidural steroid block duramorph 1.5 mg decadron 7.5 mg    NERVE BLOCK  12/12/2016    duramorph caudal epidural,decadron 7.5 mg, duramorph 1.5 mg    NERVE BLOCK  10/23/2017    duramorph, duramorph 1.5, decadron 10mg    NERVE BLOCK Bilateral 05/02/2019    bilat  medial branch nerve block   marcaine 1/4%    OTHER SURGICAL HISTORY Bilateral 05/09/2019    facet lumbar injection L4-S1    OTHER SURGICAL HISTORY Left 11/11/2019    Nerve Radiofrequency Ablation Left Lumbar Medial Branch L2-L5    PAIN MANAGEMENT PROCEDURE Right 6/18/2020    NERVE RADIOFREQUENCY ABLATION RIGHT SIDED MEDIAL BRANCH   L2-L5 performed by Radhika Rios MD at [address] [place] [address] Left 7/6/2020    LEFT MEDIAL BRANCH L2-L5 NERVE RADIOFREQUENCY ABLATION performed by Radhika Rios MD at [address] [place] [address] Right 2/15/2021    RIGHT MEDIAL BRANCH L3-S1 NERVE RADIOFREQUENCY ABLATION performed by Radhika Rios MD at [address] [place] [address] Left 3/22/2021    LEFT MEDIAL [facility] L3-S1 performed by Radhika Rios MD at [place] [address] Right 10/21/2019    NERVE RADIOFREQUENCY ABLATION RIGHT LUMBAR MEDIAL BRANCH L2-L5 performed by Radhika Rios MD at [place] [address] Left 11/11/2019    NERVE RADIOFREQUENCY ABLATION LEFT LUMBAR [facility] L2-L5 performed by Radhika Rios MD at [facility] ENDOSCOPY      x1    URETHRAL STRICTURE DILATATION         Allergies   Allergen Reactions    Celebrex [Celecoxib] Shortness Of Breath     CHEST PAIN    Penicillins Hives and Nausea And Vomiting         Current Outpatient Medications:     [START ON 8/13/2022] traMADol (ULTRAM) 50 MG tablet, Take 1 tablet by mouth every 8 hours as needed for Pain for up to 30 days. , Disp: 90 tablet, Rfl: 1    gabapentin (NEURONTIN) 300 MG capsule, Take 1 capsule by mouth in the morning and 1 capsule at noon and 1 capsule before bedtime. Do all this for 30 days. , Disp: 90 capsule, Rfl: 2    cyclobenzaprine (FLEXERIL) 10 MG tablet, Take 1 tablet by mouth daily as needed for Muscle spasms, Disp: 30 tablet, Rfl: 2    risperiDONE (RISPERDAL) 3 MG tablet, , Disp: , Rfl:     buPROPion (WELLBUTRIN SR) 150 MG extended release tablet, Take 150 mg by mouth daily, Disp: , Rfl:     hydrOXYzine (ATARAX) 25 MG tablet, , Disp: , Rfl:     sertraline (ZOLOFT) 100 MG tablet, , Disp: , Rfl:     atorvastatin (LIPITOR) 40 MG tablet, TAKE ONE TABLET BY MOUTH ONE TIME A DAY (Patient taking differently: Take 40 mg by mouth nightly), Disp: 90 tablet, Rfl: 3    docusate sodium (COLACE) 100 MG capsule, Take 1 capsule by mouth daily as needed for Constipation, Disp: 60 capsule, Rfl: 3    lidocaine (LMX) 4 % cream, Apply topically as needed for Pain Apply topically as needed. , Disp: , Rfl:     acetaminophen (TYLENOL) 325 MG tablet, Take 650 mg by mouth as needed for Pain., Disp: , Rfl:     Family History   Problem Relation Age of Onset    Breast Cancer Mother     Liver Cancer Mother     Other Father         brain cysts       Social History     Socioeconomic History    Marital status:      Spouse name: Not on file    Number of children: Not on file    Years of education: Not on file    Highest education level: Not on file   Occupational History    Not on file   Tobacco Use    Smoking status: Former     Packs/day: 1.00     Years: 22.00     Pack years: 22.00     Types: Cigarettes    Smokeless tobacco: Never    Tobacco comments:     1/2 to 1 ppd   Vaping Use    Vaping Use: Every day    Start date: 2/17/2019    Substances: Nicotine, Flavoring    Devices: Refillable tank   Substance and Sexual Activity    Alcohol use: No     Alcohol/week: 0.0 standard drinks    Drug use: No    Sexual activity: Not on file Other Topics Concern    Not on file   Social History Narrative    Not on file     Social Determinants of Health     Financial Resource Strain: Not on file   Food Insecurity: Not on file   Transportation Needs: Not on file   Physical Activity: Not on file   Stress: Not on file   Social Connections: Not on file   Intimate Partner Violence: Not on file   Housing Stability: Not on file       Review of Systems:  Review of Systems   Constitutional: Negative for chills, fever and malaise/fatigue. Cardiovascular:  Negative for chest pain. Respiratory:  Negative for cough, shortness of breath and wheezing. Musculoskeletal:  Positive for back pain, muscle cramps, muscle weakness, neck pain and stiffness. Negative for falls. Gastrointestinal:  Negative for constipation, diarrhea, nausea and vomiting. Neurological:  Negative for dizziness, headaches, numbness, vertigo and weakness. Physical Exam:  /69   Pulse 90   Ht 5' 10\" (1.778 m)   Wt 270 lb (122.5 kg)   SpO2 97%   BMI 38.74 kg/m²     Physical Exam  Cardiovascular:      Rate and Rhythm: Normal rate. Pulmonary:      Effort: Pulmonary effort is normal.   Musculoskeletal:         General: Normal range of motion. Skin:     General: Skin is warm and dry. Neurological:      Mental Status: He is alert and oriented to person, place, and time.             Assessment:  Problem List Items Addressed This Visit       Lumbosacral spondylosis without myelopathy (Chronic)    Relevant Medications    traMADol (ULTRAM) 50 MG tablet (Start on 8/13/2022)    gabapentin (NEURONTIN) 300 MG capsule    cyclobenzaprine (FLEXERIL) 10 MG tablet    Chronic back pain - Primary    Relevant Medications    traMADol (ULTRAM) 50 MG tablet (Start on 8/13/2022)    gabapentin (NEURONTIN) 300 MG capsule    cyclobenzaprine (FLEXERIL) 10 MG tablet     Other Visit Diagnoses       Chronic use of opiate for therapeutic purpose                   Treatment Plan:  Patient relates current medications are helping the pain. Patient reports taking pain medications as prescribed, denies obtaining medications from different sources and denies use of illegal drugs. Patient denies side effects from medications like nausea, vomiting, constipation or drowsiness. Patient reports current activities of daily living are possible due to medications and would like to continue them. As always, we encourage daily stretching and strengthening exercises, and recommend minimizing use of pain medications unless patient cannot get through daily activities due to pain. Contract requirements met. Continue opioid therapy. Script written for tramadol   Consider repeating RFA  Follow up appointment made for 8 weeks    I have reviewed the chief complaint and history of present illness (including ROS and PFSH) and vital documentation by my staff and I agree with their documentation and have added where applicable.

## 2022-10-07 ENCOUNTER — OFFICE VISIT (OUTPATIENT)
Dept: PAIN MANAGEMENT | Age: 47
End: 2022-10-07
Payer: COMMERCIAL

## 2022-10-07 VITALS
TEMPERATURE: 97.7 F | OXYGEN SATURATION: 98 % | HEART RATE: 88 BPM | DIASTOLIC BLOOD PRESSURE: 74 MMHG | HEIGHT: 70 IN | SYSTOLIC BLOOD PRESSURE: 121 MMHG | WEIGHT: 273 LBS | BODY MASS INDEX: 39.08 KG/M2

## 2022-10-07 DIAGNOSIS — M54.50 CHRONIC BILATERAL LOW BACK PAIN WITHOUT SCIATICA: ICD-10-CM

## 2022-10-07 DIAGNOSIS — M47.817 LUMBOSACRAL SPONDYLOSIS WITHOUT MYELOPATHY: Chronic | ICD-10-CM

## 2022-10-07 DIAGNOSIS — G89.29 CHRONIC BILATERAL LOW BACK PAIN WITHOUT SCIATICA: ICD-10-CM

## 2022-10-07 PROCEDURE — 99213 OFFICE O/P EST LOW 20 MIN: CPT | Performed by: NURSE PRACTITIONER

## 2022-10-07 RX ORDER — TRAMADOL HYDROCHLORIDE 50 MG/1
50 TABLET ORAL EVERY 8 HOURS PRN
Qty: 90 TABLET | Refills: 1 | Status: SHIPPED | OUTPATIENT
Start: 2022-10-08 | End: 2022-11-07

## 2022-10-07 RX ORDER — GABAPENTIN 300 MG/1
300 CAPSULE ORAL EVERY 8 HOURS SCHEDULED
Qty: 90 CAPSULE | Refills: 2 | Status: SHIPPED | OUTPATIENT
Start: 2022-10-07 | End: 2022-11-06

## 2022-10-07 ASSESSMENT — ENCOUNTER SYMPTOMS
CONSTIPATION: 0
SHORTNESS OF BREATH: 0
BACK PAIN: 1
COUGH: 0

## 2022-10-07 NOTE — PROGRESS NOTES
Chief Complaint:    [facility]     Patient complains of low back pain since 0880-2550 with no known injury. He had imaging with 2017 MRI lumbar spine that showed lumbar spondylosis and facet arthropathy In past he had lumbar steroid injection but steroids were discontinued because of psychiatric issue. He is also using TENS unit and OTC lidocaine cream with good relief. Patient states tramadol and RFA is managing his pain and helping him stay active. Discussed repeating RFA but would like to wait plans on moving and going on vacation first      Procedures  4/22 and 5/22  bilat Radiofrequency ablation of median branches at the Transverse processes of Transverse processes of L4, L5 and SACRAL ALA for L4/5 AND L5/S1 facet 85% relief reported     HPI:     Back Pain  This is a chronic problem. The current episode started more than 1 year ago. The problem is unchanged. The pain is present in the lumbar spine and gluteal. The quality of the pain is described as aching. The pain does not radiate. The pain is at a severity of 3/10. The pain is moderate. The pain is Worse during the day. The symptoms are aggravated by position and standing. Pertinent negatives include no chest pain or fever. Risk factors include obesity, poor posture, sedentary lifestyle and lack of exercise. He has tried analgesics for the symptoms. The treatment provided mild relief. Opioid Contract:03/17/2022  Last Urine Dug screen dated:03/22/2022    Patient denies any new neurological symptoms. No bowel or bladder incontinence, no weakness, and no falling. Pill count: appropriate     Morphine equivalent: 15  Controlled Substance Monitoring:    Acute and Chronic Pain Monitoring:   RX Monitoring 10/7/2022   Attestation -   Periodic Controlled Substance Monitoring Possible medication side effects, risk of tolerance/dependence & alternative treatments discussed. ;No signs of potential drug abuse or diversion identified. ;Assessed functional status. ;Obtaining appropriate analgesic effect of treatment. Chronic Pain > 50 MEDD -   Chronic Pain > 80 MEDD -          Periodic Controlled Substance Monitoring: Possible medication side effects, risk of tolerance/dependence & alternative treatments discussed., No signs of potential drug abuse or diversion identified. , Assessed functional status., Obtaining appropriate analgesic effect of treatment.  Yves Hunt, APRN - CNP)      Past Medical History:   Diagnosis Date    Back pain, chronic     Bipolar 2 disorder ([address]     Bronchitis     DDD (degenerative disc disease), lumbar     Depression     Displacement of lumbar intervertebral disc without myelopathy 5/7/2013    Fibula fracture LEFT    History of gastric ulcer     History of suicidal tendencies     Hyperlipidemia     Lumbar pain with radiation down both legs     Lumbar stenosis     Non-union of fracture LEFT ANKLE    Osteoarthritis     Pneumonia     Sleep disturbance     Spider bite     on great toe on left    Spondylosis     Tenosynovitis of ankle LEFT       Past Surgical History:   Procedure Laterality Date    ADENOIDECTOMY      HC INJECT OTHER PERPHRL NERV Bilateral 5/9/2019    INJECTION FACET LUMBAR L4-S1 performed by Salena Skiff, MD at [place] [address]  8/8/13    Duramorph, Celestone 9mg    NERVE BLOCK  4-24-14    duramorph epidural steroid block, celestone 9mg, morphine 1.25mg    NERVE BLOCK  12/19/2014    DURAMORPH - CELESTONE 9 MG- DURAMORPH  1.5MG    NERVE BLOCK  7/23/15    duramorph 1.5mg celestone 9mg    NERVE BLOCK  11/19/15    Tens-Empi Select    NERVE BLOCK  3-10-16    duramorph epidural steroid block duramorph 1.5 mg decadron 7.5 mg    NERVE BLOCK  12/12/2016    duramorph caudal epidural,decadron 7.5 mg, duramorph 1.5 mg    NERVE BLOCK  10/23/2017    duramorph, duramorph 1.5, decadron 10mg    NERVE BLOCK Bilateral 05/02/2019    bilat  medial branch nerve block   marcaine 1/4%    OTHER SURGICAL HISTORY Bilateral 05/09/2019 facet lumbar injection L4-S1    OTHER SURGICAL HISTORY Left 11/11/2019    Nerve Radiofrequency Ablation Left Lumbar Medial Branch L2-L5    PAIN MANAGEMENT PROCEDURE Right 6/18/2020    NERVE RADIOFREQUENCY ABLATION RIGHT SIDED MEDIAL BRANCH   L2-L5 performed by Janna Lewis MD at [facility] Left 7/6/2020    LEFT MEDIAL BRANCH L2-L5 NERVE RADIOFREQUENCY ABLATION performed by Janna Lewis MD at [facility] Right 2/15/2021    RIGHT MEDIAL BRANCH L3-S1 NERVE RADIOFREQUENCY ABLATION performed by Janna Lewis MD at [facility] Left 3/22/2021    LEFT MEDIAL [facility] L3-S1 performed by Janna Lewis MD at [address] Right 10/21/2019    NERVE RADIOFREQUENCY ABLATION RIGHT LUMBAR MEDIAL BRANCH L2-L5 performed by Janna Lewis MD at [address] Left 11/11/2019    NERVE RADIOFREQUENCY ABLATION LEFT LUMBAR [facility] L2-L5 performed by Janna Lewis MD at [facility]      x1    URETHRAL STRICTURE DILATATION         Allergies   Allergen Reactions    Celebrex [Celecoxib] Shortness Of Breath     CHEST PAIN    Penicillins Hives and Nausea And Vomiting         Current Outpatient Medications:     [START ON 10/8/2022] traMADol (ULTRAM) 50 MG tablet, Take 1 tablet by mouth every 8 hours as needed for Pain for up to 30 days. , Disp: 90 tablet, Rfl: 1    gabapentin (NEURONTIN) 300 MG capsule, Take 1 capsule by mouth every 8 hours for 30 days. , Disp: 90 capsule, Rfl: 2    risperiDONE (RISPERDAL) 3 MG tablet, , Disp: , Rfl:     buPROPion (WELLBUTRIN SR) 150 MG extended release tablet, Take 150 mg by mouth daily, Disp: , Rfl:     hydrOXYzine (ATARAX) 25 MG tablet, , Disp: , Rfl:     sertraline (ZOLOFT) 100 MG tablet, , Disp: , Rfl:     atorvastatin (LIPITOR) 40 MG tablet, TAKE ONE TABLET BY MOUTH ONE TIME A DAY (Patient taking differently: Take 40 mg by mouth nightly), Disp: 90 tablet, Rfl: 3    docusate sodium (COLACE) 100 MG capsule, Take 1 capsule by mouth daily as needed for Constipation, Disp: 60 capsule, Rfl: 3    lidocaine (LMX) 4 % cream, Apply topically as needed for Pain Apply topically as needed. , Disp: , Rfl:     acetaminophen (TYLENOL) 325 MG tablet, Take 650 mg by mouth as needed for Pain., Disp: , Rfl:     Family History   Problem Relation Age of Onset    Breast Cancer Mother     Liver Cancer Mother     Other Father         brain cysts       Social History     Socioeconomic History    Marital status:      Spouse name: Not on file    Number of children: Not on file    Years of education: Not on file    Highest education level: Not on file   Occupational History    Not on file   Tobacco Use    Smoking status: Former     Packs/day: 1.00     Years: 22.00     Pack years: 22.00     Types: Cigarettes    Smokeless tobacco: Never    Tobacco comments:     1/2 to 1 ppd   Vaping Use    Vaping Use: Every day    Start date: 2/17/2019    Substances: Nicotine, Flavoring    Devices: Refillable tank   Substance and Sexual Activity    Alcohol use: No     Alcohol/week: 0.0 standard drinks    Drug use: No    Sexual activity: Not on file   Other Topics Concern    Not on file   Social History Narrative    Not on file     Social Determinants of Health     Financial Resource Strain: Not on file   Food Insecurity: Not on file   Transportation Needs: Not on file   Physical Activity: Not on file   Stress: Not on file   Social Connections: Not on file   Intimate Partner Violence: Not on file   Housing Stability: Not on file       Review of Systems:  Review of Systems   Constitutional: Negative for chills and fever. Cardiovascular:  Negative for chest pain. Respiratory:  Negative for cough and shortness of breath. Musculoskeletal:  Positive for back pain. Gastrointestinal:  Negative for constipation. Physical Exam:  /74   Pulse 88   Temp 97.7 °F (36.5 °C)   Ht 5' 10\" (1.778 m)   Wt 273 lb (123.8 kg)   SpO2 98%   BMI 39.17 kg/m²     Physical Exam  Cardiovascular:      Rate and Rhythm: Normal rate. Pulmonary:      Effort: Pulmonary effort is normal.   Musculoskeletal:         General: Normal range of motion. Skin:     General: Skin is warm and dry. Neurological:      Mental Status: He is alert and oriented to person, place, and time. Assessment:  Problem List Items Addressed This Visit       Lumbosacral spondylosis without myelopathy (Chronic)    Relevant Medications    traMADol (ULTRAM) 50 MG tablet (Start on 10/8/2022)    gabapentin (NEURONTIN) 300 MG capsule    Chronic back pain    Relevant Medications    traMADol (ULTRAM) 50 MG tablet (Start on 10/8/2022)    gabapentin (NEURONTIN) 300 MG capsule          Treatment Plan:  Patient relates current medications are helping the pain. Patient reports taking pain medications as prescribed, denies obtaining medications from different sources and denies use of illegal drugs. Patient denies side effects from medications like nausea, vomiting, constipation or drowsiness. Patient reports current activities of daily living are possible due to medications and would like to continue them. As always, we encourage daily stretching and strengthening exercises, and recommend minimizing use of pain medications unless patient cannot get through daily activities due to pain. Contract requirements met. Continue opioid therapy. Script written for tramadol   Consider RFA in future   Follow up appointment made for 8 weeks    I have reviewed the chief complaint and history of present illness (including ROS and PFSH) and vital documentation by my staff and I agree with their documentation and have added where applicable.

## 2022-11-04 RX ORDER — CYCLOBENZAPRINE HCL 10 MG
10 TABLET ORAL DAILY PRN
Qty: 30 TABLET | Refills: 2 | OUTPATIENT
Start: 2022-11-04 | End: 2022-12-04

## 2022-11-28 ENCOUNTER — OFFICE VISIT (OUTPATIENT)
Dept: PAIN MANAGEMENT | Age: 47
End: 2022-11-28
Payer: COMMERCIAL

## 2022-11-28 VITALS — HEIGHT: 70 IN | OXYGEN SATURATION: 95 % | WEIGHT: 270 LBS | HEART RATE: 93 BPM | BODY MASS INDEX: 38.65 KG/M2

## 2022-11-28 DIAGNOSIS — M54.50 CHRONIC BILATERAL LOW BACK PAIN WITHOUT SCIATICA: ICD-10-CM

## 2022-11-28 DIAGNOSIS — G89.29 CHRONIC BILATERAL LOW BACK PAIN WITHOUT SCIATICA: ICD-10-CM

## 2022-11-28 DIAGNOSIS — M47.817 LUMBOSACRAL SPONDYLOSIS WITHOUT MYELOPATHY: Chronic | ICD-10-CM

## 2022-11-28 PROCEDURE — 99213 OFFICE O/P EST LOW 20 MIN: CPT | Performed by: NURSE PRACTITIONER

## 2022-11-28 RX ORDER — GABAPENTIN 300 MG/1
300 CAPSULE ORAL EVERY 8 HOURS SCHEDULED
Qty: 90 CAPSULE | Refills: 2 | Status: CANCELLED | OUTPATIENT
Start: 2022-11-28 | End: 2022-12-28

## 2022-11-28 RX ORDER — TRAMADOL HYDROCHLORIDE 50 MG/1
50 TABLET ORAL EVERY 8 HOURS PRN
Qty: 90 TABLET | Refills: 1 | Status: SHIPPED | OUTPATIENT
Start: 2022-12-06 | End: 2023-01-05

## 2022-11-28 RX ORDER — CYCLOBENZAPRINE HCL 10 MG
10 TABLET ORAL DAILY PRN
Qty: 30 TABLET | Refills: 2 | Status: SHIPPED | OUTPATIENT
Start: 2022-11-28 | End: 2022-12-28

## 2022-11-28 RX ORDER — TRAMADOL HYDROCHLORIDE 50 MG/1
50 TABLET ORAL EVERY 8 HOURS PRN
Qty: 90 TABLET | Refills: 1 | Status: SHIPPED | OUTPATIENT
Start: 2022-11-28 | End: 2022-11-28

## 2022-11-28 ASSESSMENT — ENCOUNTER SYMPTOMS
NAUSEA: 0
CONSTIPATION: 0
VOMITING: 0
DIARRHEA: 0
BACK PAIN: 1

## 2022-11-28 NOTE — PROGRESS NOTES
Chief Complaint   Patient presents with    Back Pain     Gabapentin tramadol          [facility]     Patient complains of low back pain since 6650-8719 with no known injury. He had imaging with 2017 MRI lumbar spine that showed lumbar spondylosis and facet arthropathy In past he had lumbar steroid injection but steroids were discontinued because of psychiatric issue. He is also using TENS unit and OTC lidocaine cream with good relief. Patient states tramadol and RFA is managing his pain and helping him stay active. Discussed repeating RFA but would like to wait plans on moving and going on vacation first      Procedures  4/22 and 5/22  bilat Radiofrequency ablation of median branches at the Transverse processes of Transverse processes of L4, L5 and SACRAL ALA for L4/5 AND L5/S1 facet 85% relief reported     HPI:   Back Pain  This is a chronic problem. The current episode started more than 1 year ago. The problem occurs constantly. The problem is unchanged. The pain is present in the lumbar spine. The quality of the pain is described as aching. The pain does not radiate. The pain is at a severity of 2/10. The pain is mild. The pain is Worse during the day. The symptoms are aggravated by bending, standing and twisting. Pertinent negatives include no fever, headaches or numbness. Risk factors include obesity, poor posture, sedentary lifestyle and lack of exercise. He has tried analgesics for the symptoms. The treatment provided mild relief.       Medication Refill:     Pain score Today:  2  Adverse effects (Constipation / Nausea / Sedation / sexual Dysfunction / others) : contraption   Mood: poor  Sleep pattern and quality: fair  Activity level: good    Pill count Today: tramadol # 32  Last dose taken  11/29/2022  OARRS report reviewed today: yes  ER/Hospitalizations/PCP visit related to pain since last visit:no   Any legal problems e.g. DUI etc.:No  Satisfied with current management: Yes    Opioid Contract: new Contract 11/28/2022  Last Urine Dug screen dated: 03/22/2022    No results found for: LABA1C    Past Medical History, Past Surgical History, Social History, Allergies and Medications reviewed and updated in EPIC as indicated    Family History reviewed and is noncontributory. Patient denies any new neurological symptoms. No bowel or bladder incontinence, no weakness, and no falling. Pill count: appropriate    Morphine equivalent: 15    Controlled Substance Monitoring:    Acute and Chronic Pain Monitoring:   RX Monitoring 11/28/2022   Attestation -   Periodic Controlled Substance Monitoring Possible medication side effects, risk of tolerance/dependence & alternative treatments discussed. ;No signs of potential drug abuse or diversion identified. ;Assessed functional status. ;Obtaining appropriate analgesic effect of treatment. Chronic Pain > 50 MEDD -   Chronic Pain > 80 MEDD -          Periodic Controlled Substance Monitoring: Possible medication side effects, risk of tolerance/dependence & alternative treatments discussed., No signs of potential drug abuse or diversion identified. , Assessed functional status., Obtaining appropriate analgesic effect of treatment.  Inocencia Diaz, APRN - CNP)      Past Medical History:   Diagnosis Date    Back pain, chronic     Bipolar 2 disorder ([address]     Bronchitis     DDD (degenerative disc disease), lumbar     Depression     Displacement of lumbar intervertebral disc without myelopathy 5/7/2013    Fibula fracture LEFT    History of gastric ulcer     History of suicidal tendencies     Hyperlipidemia     Lumbar pain with radiation down both legs     Lumbar stenosis     Non-union of fracture LEFT ANKLE    Osteoarthritis     Pneumonia     Sleep disturbance     Spider bite     on great toe on left    Spondylosis     Tenosynovitis of ankle LEFT       Past Surgical History:   Procedure Laterality Date    ADENOIDECTOMY      HC INJECT OTHER PERPHRL NERV Bilateral 5/9/2019 INJECTION FACET LUMBAR L4-S1 performed by Yarely Mcgarry MD at [facility]  8/8/13    Duramorph, Celestone 9mg    NERVE BLOCK  4-24-14    duramorph epidural steroid block, celestone 9mg, morphine 1.25mg    NERVE BLOCK  12/19/2014    DURAMORPH - CELESTONE 9 MG- DURAMORPH  1.5MG    NERVE BLOCK  7/23/15    duramorph 1.5mg celestone 9mg    NERVE BLOCK  11/19/15    Tens-Empi Select    NERVE BLOCK  3-10-16    duramorph epidural steroid block duramorph 1.5 mg decadron 7.5 mg    NERVE BLOCK  12/12/2016    duramorph caudal epidural,decadron 7.5 mg, duramorph 1.5 mg    NERVE BLOCK  10/23/2017    duramorph, duramorph 1.5, decadron 10mg    NERVE BLOCK Bilateral 05/02/2019    bilat  medial branch nerve block   marcaine 1/4%    OTHER SURGICAL HISTORY Bilateral 05/09/2019    facet lumbar injection L4-S1    OTHER SURGICAL HISTORY Left 11/11/2019    Nerve Radiofrequency Ablation Left Lumbar Medial Branch L2-L5    PAIN MANAGEMENT PROCEDURE Right 6/18/2020    NERVE RADIOFREQUENCY ABLATION RIGHT SIDED MEDIAL BRANCH   L2-L5 performed by Maxwell Clinton MD at [facility] Left 7/6/2020    LEFT MEDIAL BRANCH L2-L5 NERVE RADIOFREQUENCY ABLATION performed by Maxwell Clinton MD at [facility] Right 2/15/2021    RIGHT MEDIAL BRANCH L3-S1 NERVE RADIOFREQUENCY ABLATION performed by Maxwell Clinton MD at [facility] Left 3/22/2021    LEFT MEDIAL [facility] L3-S1 performed by Maxwell Clinton MD at [address] Right 10/21/2019    NERVE RADIOFREQUENCY ABLATION RIGHT LUMBAR MEDIAL BRANCH L2-L5 performed by Maxwell Clinton MD at [address] Left 11/11/2019    NERVE RADIOFREQUENCY ABLATION LEFT LUMBAR [facility] L2-L5 performed by Maxwell Clinton MD at [address] [place] [address] Allergies   Allergen Reactions    Celebrex [Celecoxib] Shortness Of Breath     CHEST PAIN    Penicillins Hives and Nausea And Vomiting         Current Outpatient Medications:     traMADol (ULTRAM) 50 MG tablet, Take 1 tablet by mouth every 8 hours as needed for Pain for up to 30 days. , Disp: 90 tablet, Rfl: 1    cyclobenzaprine (FLEXERIL) 10 MG tablet, Take 1 tablet by mouth daily as needed for Muscle spasms, Disp: 30 tablet, Rfl: 2    gabapentin (NEURONTIN) 300 MG capsule, Take 1 capsule by mouth every 8 hours for 30 days. , Disp: 90 capsule, Rfl: 2    risperiDONE (RISPERDAL) 3 MG tablet, , Disp: , Rfl:     buPROPion (WELLBUTRIN SR) 150 MG extended release tablet, Take 150 mg by mouth daily, Disp: , Rfl:     hydrOXYzine (ATARAX) 25 MG tablet, , Disp: , Rfl:     sertraline (ZOLOFT) 100 MG tablet, , Disp: , Rfl:     atorvastatin (LIPITOR) 40 MG tablet, TAKE ONE TABLET BY MOUTH ONE TIME A DAY (Patient taking differently: Take 40 mg by mouth nightly), Disp: 90 tablet, Rfl: 3    docusate sodium (COLACE) 100 MG capsule, Take 1 capsule by mouth daily as needed for Constipation, Disp: 60 capsule, Rfl: 3    lidocaine (LMX) 4 % cream, Apply topically as needed for Pain Apply topically as needed. , Disp: , Rfl:     acetaminophen (TYLENOL) 325 MG tablet, Take 650 mg by mouth as needed for Pain., Disp: , Rfl:     Family History   Problem Relation Age of Onset    Breast Cancer Mother     Liver Cancer Mother     Other Father         brain cysts       Social History     Socioeconomic History    Marital status:      Spouse name: Not on file    Number of children: Not on file    Years of education: Not on file    Highest education level: Not on file   Occupational History    Not on file   Tobacco Use    Smoking status: Former     Packs/day: 1.00     Years: 22.00     Pack years: 22.00     Types: Cigarettes    Smokeless tobacco: Never    Tobacco comments:     1/2 to 1 ppd   Vaping Use    Vaping Use: Every day    Start date: 2/17/2019    Substances: Nicotine, Flavoring    Devices: Refillable tank   Substance and Sexual Activity    Alcohol use: No     Alcohol/week: 0.0 standard drinks    Drug use: No    Sexual activity: Not on file   Other Topics Concern    Not on file   Social History Narrative    Not on file     Social Determinants of Health     Financial Resource Strain: Not on file   Food Insecurity: Not on file   Transportation Needs: Not on file   Physical Activity: Not on file   Stress: Not on file   Social Connections: Not on file   Intimate Partner Violence: Not on file   Housing Stability: Not on file       Review of Systems:  Review of Systems   Constitutional: Negative for chills, fever and malaise/fatigue. Musculoskeletal:  Positive for back pain, muscle cramps, muscle weakness and stiffness. Negative for falls. Gastrointestinal:  Negative for constipation, diarrhea, nausea and vomiting. Neurological:  Negative for dizziness, headaches and numbness. Physical Exam:  Pulse 93   Ht 5' 10\" (1.778 m)   Wt 270 lb (122.5 kg)   SpO2 95%   BMI 38.74 kg/m²     Physical Exam  Cardiovascular:      Rate and Rhythm: Normal rate. Pulmonary:      Effort: Pulmonary effort is normal.   Musculoskeletal:         General: Normal range of motion. Skin:     General: Skin is warm and dry. Neurological:      Mental Status: He is alert and oriented to person, place, and time. Assessment:  Problem List Items Addressed This Visit       Lumbosacral spondylosis without myelopathy (Chronic)    Relevant Medications    traMADol (ULTRAM) 50 MG tablet    cyclobenzaprine (FLEXERIL) 10 MG tablet    Chronic back pain    Relevant Medications    traMADol (ULTRAM) 50 MG tablet    cyclobenzaprine (FLEXERIL) 10 MG tablet          Treatment Plan:  Patient relates current medications are helping the pain.  Patient reports taking pain medications as prescribed, denies obtaining medications from different sources and denies use of illegal drugs. Medication risk and benefits have been discussed. Patient denies side effects from medications like nausea, vomiting, constipation or drowsiness. Patient reports current activities of daily living are possible due to medications and would like to continue them. As always, we encourage daily stretching and strengthening exercises, and recommend minimizing use of pain medications unless patient cannot get through daily activities due to pain. Due to the high risk nature of this patient's pain medication close monitoring is required. Continue current medication management, pt has been stable and compliant. Script written for tramadol   Follow up appointment made for 8 weeks    I have reviewed the chief complaint and history of present illness (including ROS and PFSH) and vital documentation by my staff and I agree with their documentation and have added where applicable.

## 2023-01-03 ENCOUNTER — TELEPHONE (OUTPATIENT)
Dept: PAIN MANAGEMENT | Age: 48
End: 2023-01-03

## 2023-01-03 DIAGNOSIS — M54.50 CHRONIC BILATERAL LOW BACK PAIN WITHOUT SCIATICA: ICD-10-CM

## 2023-01-03 DIAGNOSIS — M47.817 LUMBOSACRAL SPONDYLOSIS WITHOUT MYELOPATHY: Chronic | ICD-10-CM

## 2023-01-03 DIAGNOSIS — G89.29 CHRONIC BILATERAL LOW BACK PAIN WITHOUT SCIATICA: ICD-10-CM

## 2023-01-03 RX ORDER — TRAMADOL HYDROCHLORIDE 50 MG/1
50 TABLET ORAL EVERY 8 HOURS PRN
Qty: 90 TABLET | Refills: 1 | Status: SHIPPED | OUTPATIENT
Start: 2023-01-05 | End: 2023-02-04

## 2023-01-09 ENCOUNTER — TELEPHONE (OUTPATIENT)
Dept: PAIN MANAGEMENT | Age: 48
End: 2023-01-09

## 2023-01-09 DIAGNOSIS — M47.817 LUMBOSACRAL SPONDYLOSIS WITHOUT MYELOPATHY: Chronic | ICD-10-CM

## 2023-01-09 DIAGNOSIS — M54.50 CHRONIC BILATERAL LOW BACK PAIN WITHOUT SCIATICA: ICD-10-CM

## 2023-01-09 DIAGNOSIS — G89.29 CHRONIC BILATERAL LOW BACK PAIN WITHOUT SCIATICA: ICD-10-CM

## 2023-01-09 RX ORDER — GABAPENTIN 300 MG/1
300 CAPSULE ORAL EVERY 8 HOURS SCHEDULED
Qty: 90 CAPSULE | Refills: 2 | Status: SHIPPED | OUTPATIENT
Start: 2023-01-09 | End: 2023-02-08

## 2023-02-27 ENCOUNTER — OFFICE VISIT (OUTPATIENT)
Dept: PAIN MANAGEMENT | Age: 48
End: 2023-02-27
Payer: COMMERCIAL

## 2023-02-27 VITALS — BODY MASS INDEX: 38.65 KG/M2 | WEIGHT: 270 LBS | HEIGHT: 70 IN | HEART RATE: 92 BPM | OXYGEN SATURATION: 97 %

## 2023-02-27 DIAGNOSIS — M47.817 LUMBOSACRAL SPONDYLOSIS WITHOUT MYELOPATHY: Chronic | ICD-10-CM

## 2023-02-27 DIAGNOSIS — G89.29 CHRONIC BILATERAL LOW BACK PAIN WITHOUT SCIATICA: ICD-10-CM

## 2023-02-27 DIAGNOSIS — Z79.891 CHRONIC USE OF OPIATE FOR THERAPEUTIC PURPOSE: Primary | ICD-10-CM

## 2023-02-27 DIAGNOSIS — M54.50 CHRONIC BILATERAL LOW BACK PAIN WITHOUT SCIATICA: ICD-10-CM

## 2023-02-27 PROCEDURE — 99213 OFFICE O/P EST LOW 20 MIN: CPT | Performed by: NURSE PRACTITIONER

## 2023-02-27 RX ORDER — TRAMADOL HYDROCHLORIDE 50 MG/1
50 TABLET ORAL EVERY 8 HOURS PRN
Qty: 90 TABLET | Refills: 1 | Status: SHIPPED | OUTPATIENT
Start: 2023-03-07 | End: 2023-04-06

## 2023-02-27 RX ORDER — CLONAZEPAM 0.5 MG/1
TABLET ORAL
COMMUNITY
Start: 2023-01-19 | End: 2023-02-27 | Stop reason: ALTCHOICE

## 2023-02-27 RX ORDER — CYCLOBENZAPRINE HCL 10 MG
TABLET ORAL
COMMUNITY
Start: 2023-01-23 | End: 2023-02-27 | Stop reason: SDUPTHER

## 2023-02-27 RX ORDER — CYCLOBENZAPRINE HCL 10 MG
TABLET ORAL
COMMUNITY
End: 2023-02-27

## 2023-02-27 RX ORDER — CYCLOBENZAPRINE HCL 10 MG
10 TABLET ORAL NIGHTLY PRN
Qty: 30 TABLET | Refills: 2 | Status: SHIPPED | OUTPATIENT
Start: 2023-02-27

## 2023-02-27 RX ORDER — GABAPENTIN 300 MG/1
300 CAPSULE ORAL EVERY 8 HOURS SCHEDULED
Qty: 90 CAPSULE | Refills: 2 | Status: CANCELLED | OUTPATIENT
Start: 2023-02-27 | End: 2023-03-29

## 2023-02-27 ASSESSMENT — ENCOUNTER SYMPTOMS
DIARRHEA: 0
BACK PAIN: 1
CONSTIPATION: 0
NAUSEA: 0
VOMITING: 0

## 2023-02-27 NOTE — PROGRESS NOTES
Chief Complaint   Patient presents with    Back Pain     Gabapentin, Tramadol, flexeril          [facility]     Patient complains of low back pain since [id number] with no known injury. He had imaging with 2017 MRI lumbar spine that showed lumbar spondylosis and facet arthropathy In past he had lumbar steroid injection but steroids were discontinued because of psychiatric issue. He is also using TENS unit and OTC lidocaine cream with good relief. Patient states tramadol and RFA is managing his pain and helping him stay active. Discussed repeating RFA but would like to wait plans on moving and going on vacation first      Procedures  4/22 and 5/22  bilat Radiofrequency ablation of median branches at the Transverse processes of Transverse processes of L4, L5 and SACRAL ALA for L4/5 AND L5/S1 facet 85% relief reported      HPI:   Back Pain  This is a chronic problem. The current episode started more than 1 year ago. The problem occurs constantly. The problem is unchanged. The pain is present in the lumbar spine. The quality of the pain is described as aching. The pain does not radiate. The pain is at a severity of 2/10. The pain is mild. The pain is Worse during the day. The symptoms are aggravated by bending, standing and twisting. Pertinent negatives include no fever, headaches or numbness. Risk factors include obesity, poor posture, sedentary lifestyle and lack of exercise. He has tried analgesics for the symptoms. The treatment provided mild relief.           Medication Refill:     Pain score Today:  2  Adverse effects (Constipation / Nausea / Sedation / sexual Dysfunction / others) : no  Mood: good  Sleep pattern and quality: good  Activity level: good    Pill count Today: Tramadol # 26  Last dose taken  02/27/2023  [place]AR[place] report reviewed today: yes  ER/Hospitalizations/PCP visit related to pain since last visit:no   Any legal problems e.g. DUI etc.:No  Satisfied with current management: Yes    Opioid Contract: 11/28/2022  Last Urine Dug screen dated: 03/22/2022    No results found for: LABA1C    Past Medical History, Past Surgical History, Social History, Allergies and Medications reviewed and updated in EPIC as indicated    Family History reviewed and is noncontributory. Patient denies any new neurological symptoms. No bowel or bladder incontinence, no weakness, and no falling. Pill count: appropriate    Morphine equivalent: 15    Controlled Substance Monitoring:    Acute and Chronic Pain Monitoring:   RX Monitoring 2/27/2023   Attestation -   Periodic Controlled Substance Monitoring Possible medication side effects, risk of tolerance/dependence & alternative treatments discussed. ;No signs of potential drug abuse or diversion identified.;Obtaining appropriate analgesic effect of treatment. ;Assessed functional status. Chronic Pain > 50 MEDD -   Chronic Pain > 80 MEDD -          Periodic Controlled Substance Monitoring: Possible medication side effects, risk of tolerance/dependence & alternative treatments discussed., No signs of potential drug abuse or diversion identified., Obtaining appropriate analgesic effect of treatment., Assessed functional status.  Kady Chaudhary, APRN - CNP)      Past Medical History:   Diagnosis Date    Back pain, chronic     Bipolar 2 disorder ([address]     Bronchitis     DDD (degenerative disc disease), lumbar     Depression     Displacement of lumbar intervertebral disc without myelopathy 5/7/2013    Fibula fracture LEFT    History of gastric ulcer     History of suicidal tendencies     Hyperlipidemia     Lumbar pain with radiation down both legs     Lumbar stenosis     Non-union of fracture LEFT ANKLE    Osteoarthritis     Pneumonia     Sleep disturbance     Spider bite     on great toe on left    Spondylosis     Tenosynovitis of ankle LEFT       Past Surgical History:   Procedure Laterality Date    ADENOIDECTOMY      HC INJECT OTHER PERPHRL NERV Bilateral 5/9/2019    INJECTION FACET LUMBAR L4-S1 performed by Fernando Hernandez MD at [facility]  8/8/13    Duramorph, Celestone 9mg    NERVE BLOCK  4-24-14    duramorph epidural steroid block, celestone 9mg, morphine 1.25mg    NERVE BLOCK  12/19/2014    DURAMORPH - CELESTONE 9 MG- DURAMORPH  1.5MG    NERVE BLOCK  7/23/15    duramorph 1.5mg celestone 9mg    NERVE BLOCK  11/19/15    Tens-Empi Select    NERVE BLOCK  3-10-16    duramorph epidural steroid block duramorph 1.5 mg decadron 7.5 mg    NERVE BLOCK  12/12/2016    duramorph caudal epidural,decadron 7.5 mg, duramorph 1.5 mg    NERVE BLOCK  10/23/2017    duramorph, duramorph 1.5, decadron 10mg    NERVE BLOCK Bilateral 05/02/2019    bilat  medial branch nerve block   marcaine 1/4%    OTHER SURGICAL HISTORY Bilateral 05/09/2019    facet lumbar injection L4-S1    OTHER SURGICAL HISTORY Left 11/11/2019    Nerve Radiofrequency Ablation Left Lumbar Medial Branch L2-L5    PAIN MANAGEMENT PROCEDURE Right 6/18/2020    NERVE RADIOFREQUENCY ABLATION RIGHT SIDED MEDIAL BRANCH   L2-L5 performed by Kourtney Young MD at [facility] Left 7/6/2020    LEFT MEDIAL BRANCH L2-L5 NERVE RADIOFREQUENCY ABLATION performed by Kourtney Young MD at [facility] Right 2/15/2021    RIGHT MEDIAL BRANCH L3-S1 NERVE RADIOFREQUENCY ABLATION performed by Kourtney Young MD at [facility] Left 3/22/2021    LEFT MEDIAL [facility] L3-S1 performed by Kourtney Young MD at [address] Right 10/21/2019    NERVE RADIOFREQUENCY ABLATION RIGHT LUMBAR MEDIAL BRANCH L2-L5 performed by Kourtney Young MD at [address] Left 11/11/2019    NERVE RADIOFREQUENCY ABLATION LEFT LUMBAR [address] [facility] L2-L5 performed by Kourtney Young MD at [facility] ENDOSCOPY      x1    URETHRAL STRICTURE DILATATION         Allergies Allergen Reactions    Celebrex [Celecoxib] Shortness Of Breath     CHEST PAIN    Penicillins Hives and Nausea And Vomiting         Current Outpatient Medications:     [START ON 3/7/2023] traMADol (ULTRAM) 50 MG tablet, Take 1 tablet by mouth every 8 hours as needed for Pain for up to 30 days. , Disp: 90 tablet, Rfl: 1    cyclobenzaprine (FLEXERIL) 10 MG tablet, Take 1 tablet by mouth nightly as needed for Muscle spasms, Disp: 30 tablet, Rfl: 2    gabapentin (NEURONTIN) 300 MG capsule, Take 1 capsule by mouth every 8 hours for 30 days. , Disp: 90 capsule, Rfl: 2    risperiDONE (RISPERDAL) 3 MG tablet, , Disp: , Rfl:     buPROPion (WELLBUTRIN SR) 150 MG extended release tablet, Take 150 mg by mouth daily, Disp: , Rfl:     hydrOXYzine (ATARAX) 25 MG tablet, , Disp: , Rfl:     sertraline (ZOLOFT) 100 MG tablet, , Disp: , Rfl:     atorvastatin (LIPITOR) 40 MG tablet, TAKE ONE TABLET BY MOUTH ONE TIME A DAY (Patient taking differently: Take 40 mg by mouth nightly), Disp: 90 tablet, Rfl: 3    docusate sodium (COLACE) 100 MG capsule, Take 1 capsule by mouth daily as needed for Constipation, Disp: 60 capsule, Rfl: 3    lidocaine (LMX) 4 % cream, Apply topically as needed for Pain Apply topically as needed. , Disp: , Rfl:     acetaminophen (TYLENOL) 325 MG tablet, Take 650 mg by mouth as needed for Pain., Disp: , Rfl:     Family History   Problem Relation Age of Onset    Breast Cancer Mother     Liver Cancer Mother     Other Father         brain cysts       Social History     Socioeconomic History    Marital status:      Spouse name: Not on file    Number of children: Not on file    Years of education: Not on file    Highest education level: Not on file   Occupational History    Not on file   Tobacco Use    Smoking status: Former     Packs/day: 1.00     Years: 22.00     Pack years: 22.00     Types: Cigarettes    Smokeless tobacco: Never    Tobacco comments:     1/2 to 1 ppd   Vaping Use    Vaping Use: Every day Start date: 2/17/2019    Substances: Nicotine, Flavoring    Devices: Refillable tank   Substance and Sexual Activity    Alcohol use: No     Alcohol/week: 0.0 standard drinks    Drug use: No    Sexual activity: Not on file   Other Topics Concern    Not on file   Social History Narrative    Not on file     Social Determinants of Health     Financial Resource Strain: Not on file   Food Insecurity: Not on file   Transportation Needs: Not on file   Physical Activity: Not on file   Stress: Not on file   Social Connections: Not on file   Intimate Partner Violence: Not on file   Housing Stability: Not on file       Review of Systems:  Review of Systems   Constitutional: Negative for chills, fever and malaise/fatigue. Musculoskeletal:  Positive for back pain. Negative for falls, muscle cramps, muscle weakness and stiffness. Gastrointestinal:  Negative for constipation, diarrhea, nausea and vomiting. Neurological:  Negative for dizziness, headaches and numbness. Physical Exam:  Pulse 92   Ht 5' 10\" (1.778 m)   Wt 270 lb (122.5 kg)   SpO2 97%   BMI 38.74 kg/m²     Physical Exam  Cardiovascular:      Rate and Rhythm: Normal rate. Pulmonary:      Effort: Pulmonary effort is normal.   Musculoskeletal:         General: Normal range of motion. Skin:     General: Skin is warm and dry. Neurological:      Mental Status: He is alert and oriented to person, place, and time.          Assessment:  Problem List Items Addressed This Visit       Lumbosacral spondylosis without myelopathy (Chronic)    Relevant Medications    traMADol (ULTRAM) 50 MG tablet (Start on 3/7/2023)    cyclobenzaprine (FLEXERIL) 10 MG tablet    Chronic back pain    Relevant Medications    traMADol (ULTRAM) 50 MG tablet (Start on 3/7/2023)    cyclobenzaprine (FLEXERIL) 10 MG tablet     Other Visit Diagnoses       Chronic use of opiate for therapeutic purpose    -  Primary               Treatment Plan:  Patient relates current medications are helping the pain. Patient reports taking pain medications as prescribed, denies obtaining medications from different sources and denies use of illegal drugs. Medication risk and benefits have been discussed. Patient denies side effects from medications like nausea, vomiting, constipation or drowsiness. Patient reports current activities of daily living are possible due to medications and would like to continue them. As always, we encourage daily stretching and strengthening exercises, and recommend minimizing use of pain medications unless patient cannot get through daily activities due to pain. Due to the high risk nature of this patient's pain medication close monitoring is required. Continue current medication management, pt has been stable and compliant. Script written for tramadol   Consider RFA if pain worsens   Follow up appointment made for 8 weeks    I have reviewed the chief complaint and history of present illness (including ROS and PFSH) and vital documentation by my staff and I agree with their documentation and have added where applicable.

## 2023-03-31 ENCOUNTER — HOSPITAL ENCOUNTER (OUTPATIENT)
Age: 48
Discharge: HOME | End: 2023-03-31
Payer: COMMERCIAL

## 2023-03-31 LAB
25(OH)D3 SERPL-MCNC: 6.7 NG/ML
ALBUMIN SERPL-MCNC: 4.2 G/DL (ref 3.5–5.2)
ALBUMIN/GLOBULIN RATIO: 1.4 (ref 1–2.5)
ALP SERPL-CCNC: 90 U/L (ref 40–129)
ALT SERPL-CCNC: 23 U/L (ref 5–41)
ANION GAP SERPL CALCULATED.3IONS-SCNC: 12 MMOL/L (ref 9–17)
AST SERPL-CCNC: 20 U/L
BILIRUB SERPL-MCNC: 0.2 MG/DL (ref 0.3–1.2)
BUN SERPL-MCNC: 24 MG/DL (ref 6–20)
CALCIUM SERPL-MCNC: 9.4 MG/DL (ref 8.6–10.4)
CHLORIDE SERPL-SCNC: 100 MMOL/L (ref 98–107)
CHOLEST SERPL-MCNC: 203 MG/DL
CHOLESTEROL/HDL RATIO: 6.5
CO2 SERPL-SCNC: 21 MMOL/L (ref 20–31)
CREAT SERPL-MCNC: 1.72 MG/DL (ref 0.7–1.2)
GFR SERPL CREATININE-BSD FRML MDRD: 48 ML/MIN/1.73M2
GLUCOSE SERPL-MCNC: 103 MG/DL (ref 70–99)
HDLC SERPL-MCNC: 31 MG/DL
LDLC SERPL CALC-MCNC: 102 MG/DL (ref 0–130)
POTASSIUM SERPL-SCNC: 4.5 MMOL/L (ref 3.7–5.3)
PROT SERPL-MCNC: 7.2 G/DL (ref 6.4–8.3)
SODIUM SERPL-SCNC: 133 MMOL/L (ref 135–144)
TRIGL SERPL-MCNC: 348 MG/DL
TSH SERPL-ACNC: 1.36 UIU/ML (ref 0.3–5)

## 2023-03-31 PROCEDURE — 84443 ASSAY THYROID STIM HORMONE: CPT

## 2023-03-31 PROCEDURE — 80053 COMPREHEN METABOLIC PANEL: CPT

## 2023-03-31 PROCEDURE — 80061 LIPID PANEL: CPT

## 2023-03-31 PROCEDURE — 36415 COLL VENOUS BLD VENIPUNCTURE: CPT

## 2023-03-31 PROCEDURE — 82306 VITAMIN D 25 HYDROXY: CPT

## 2023-04-04 ENCOUNTER — TELEPHONE (OUTPATIENT)
Dept: PAIN MANAGEMENT | Age: 48
End: 2023-04-04

## 2023-04-04 DIAGNOSIS — G89.29 CHRONIC BILATERAL LOW BACK PAIN WITHOUT SCIATICA: ICD-10-CM

## 2023-04-04 DIAGNOSIS — M47.817 LUMBOSACRAL SPONDYLOSIS WITHOUT MYELOPATHY: Chronic | ICD-10-CM

## 2023-04-04 DIAGNOSIS — M54.50 CHRONIC BILATERAL LOW BACK PAIN WITHOUT SCIATICA: ICD-10-CM

## 2023-04-06 RX ORDER — GABAPENTIN 300 MG/1
300 CAPSULE ORAL EVERY 8 HOURS SCHEDULED
Qty: 90 CAPSULE | Refills: 0 | Status: SHIPPED | OUTPATIENT
Start: 2023-04-06 | End: 2023-05-06

## 2023-04-24 ENCOUNTER — OFFICE VISIT (OUTPATIENT)
Dept: PAIN MANAGEMENT | Age: 48
End: 2023-04-24
Payer: COMMERCIAL

## 2023-04-24 VITALS — HEART RATE: 87 BPM | OXYGEN SATURATION: 96 % | HEIGHT: 70 IN | BODY MASS INDEX: 38.65 KG/M2 | WEIGHT: 270 LBS

## 2023-04-24 DIAGNOSIS — M47.817 LUMBOSACRAL SPONDYLOSIS WITHOUT MYELOPATHY: Chronic | ICD-10-CM

## 2023-04-24 DIAGNOSIS — Z79.891 CHRONIC USE OF OPIATE DRUG FOR THERAPEUTIC PURPOSE: ICD-10-CM

## 2023-04-24 DIAGNOSIS — M54.50 CHRONIC BILATERAL LOW BACK PAIN WITHOUT SCIATICA: Primary | ICD-10-CM

## 2023-04-24 DIAGNOSIS — G89.29 CHRONIC BILATERAL LOW BACK PAIN WITHOUT SCIATICA: Primary | ICD-10-CM

## 2023-04-24 PROCEDURE — 99214 OFFICE O/P EST MOD 30 MIN: CPT | Performed by: NURSE PRACTITIONER

## 2023-04-24 RX ORDER — GABAPENTIN 300 MG/1
300 CAPSULE ORAL EVERY 8 HOURS SCHEDULED
Qty: 90 CAPSULE | Refills: 1 | Status: SHIPPED | OUTPATIENT
Start: 2023-04-24 | End: 2023-05-24

## 2023-04-24 RX ORDER — TRAMADOL HYDROCHLORIDE 50 MG/1
50 TABLET ORAL EVERY 8 HOURS PRN
Qty: 90 TABLET | Refills: 1 | Status: SHIPPED | OUTPATIENT
Start: 2023-05-05 | End: 2023-06-04

## 2023-04-24 RX ORDER — COLCHICINE 0.6 MG/1
TABLET ORAL
COMMUNITY
Start: 2023-04-10

## 2023-04-24 RX ORDER — ERGOCALCIFEROL 1.25 MG/1
CAPSULE ORAL
COMMUNITY
Start: 2023-04-10

## 2023-04-24 ASSESSMENT — ENCOUNTER SYMPTOMS
BACK PAIN: 1
CONSTIPATION: 0
DIARRHEA: 0
NAUSEA: 0
VOMITING: 0

## 2023-04-24 NOTE — PROGRESS NOTES
pain.     Due to the high risk nature of this patient's pain medication close monitoring is required. Continue current medication management, pt has been stable and compliant. Script written for tramadol and gabapentin  ROSSI obtained today for monitoring purposes. Last dose of medication was today  Lumbar  MRI ordered today to further evaluate pathology and guide treatment plan. Consider intervention and/or surgical consult based on findings  Follow up appointment made for 8  weeks    I have reviewed the chief complaint and history of present illness (including ROS and PFSH) and vital documentation by my staff and I agree with their documentation and have added where applicable.

## 2023-04-27 ENCOUNTER — HOSPITAL ENCOUNTER (OUTPATIENT)
Age: 48
Discharge: HOME | End: 2023-04-27
Payer: COMMERCIAL

## 2023-04-27 LAB
PROSTATE SPECIFIC ANTIGEN: 0.65 NG/ML
URATE SERPL-MCNC: 8.9 MG/DL (ref 3.4–7)

## 2023-04-27 PROCEDURE — 36415 COLL VENOUS BLD VENIPUNCTURE: CPT

## 2023-04-27 PROCEDURE — G0103 PSA SCREENING: HCPCS

## 2023-04-27 PROCEDURE — 84550 ASSAY OF BLOOD/URIC ACID: CPT

## 2023-05-02 NOTE — PROGRESS NOTES
Patient is here today to review medication contract and follow up for duramorph    Chief Complaint: back pain    PMH: Patient complains of low back pain since [id number] with no known injury. He uses a cane for stability but states this is due to foot/ankle pain not back pain - he did have a fracture in the left ankle the past.  He recevies duramorph injections about every 8-9 months with good relief. His last one was 12/2016 and he states the pain is returning. He is also using TENS unit and OTC lidocaine cream with good relief. He has never had back surgery. He does try to walk 4x week. He saw Dr. Ana M Tony at last visit and was complaining on neck pain. XR and PT ordered - XR not completed as of today. He starts PT next week. He had duramorph 10/23/17 and reports 75% ongoing relief. Back Pain   This is a chronic problem. The current episode started more than 1 year ago. The problem occurs constantly. The problem is unchanged. The pain is present in the lumbar spine and sacro-iliac. The quality of the pain is described as aching, burning, stabbing and shooting. The pain radiates to the right thigh and left thigh. The pain is at a severity of 1/10. The pain is mild. The pain is the same all the time. The symptoms are aggravated by bending, standing, sitting and position. Stiffness is present all day. Associated symptoms include leg pain, numbness and weakness. Pertinent negatives include no chest pain or fever. He has tried analgesics, heat and bed rest (tub bath, exercise) for the symptoms. The treatment provided moderate relief. Neck Pain    This is a chronic problem. The current episode started more than 1 year ago. The problem occurs constantly. The problem has been unchanged. The pain is present in the midline. The pain is at a severity of 1/10. The pain is mild. The symptoms are aggravated by twisting and position. Associated symptoms include leg pain, numbness and weakness.  Pertinent negatives include no chest pain or fever. He has tried oral narcotics for the symptoms. The treatment provided mild relief. Patient denies any new neurological symptoms. No bowel or bladder incontinence, no weakness, and no falling. Pill count: appropriate    Morphine equivalent: 22.5    Controlled Substances Monitoring:     Attestation: The Prescription Monitoring Report for this patient was reviewed today.  (Steven Mercedes CNP)  Documentation: Possible medication side effects, risk of tolerance and/or dependence, and alternative treatments discussed., No signs of potential drug abuse or diversion identified., Existing medication contract. Steven Mercedes CNP)    Past Medical History:   Diagnosis Date    Back pain, chronic     Bipolar 1 disorder ([facility] [address] 8/2/2013    goes to [facility] Bipolar 2 disorder ([facility] [address]     Bronchitis     Degenerative disc disease     Depression     Displacement of lumbar intervertebral disc without myelopathy 5/7/2013    Fibula fracture LEFT    History of gastric ulcer     History of suicidal tendencies     Lumbar pain with radiation down both legs     Lumbar stenosis     Non-union of fracture LEFT ANKLE    Osteoarthritis     Pneumonia     Sleep disturbance     Spider bite     on great toe on left    Spondylosis     Tenosynovitis of ankle LEFT       Past Surgical History:   Procedure Laterality Date    ADENOIDECTOMY      NERVE BLOCK  8/8/13    Duramorph, Celestone 9mg    NERVE BLOCK  4-24-14    duramorph epidural steroid block, celestone 9mg, morphine 1.25mg    NERVE BLOCK  12/19/2014    DURAMORPH - CELESTONE 9 MG- DURAMORPH  1.5MG    NERVE BLOCK  7/23/15    duramorph 1.5mg celestone 9mg    NERVE BLOCK  11/19/15    Tens-Empi Select    NERVE BLOCK  3-10-16    duramorph epidural steroid block duramorph 1.5 mg decadron 7.5 mg    NERVE BLOCK  12/12/2016    duramorph caudal epidural,decadron 7.5 mg, duramorph 1.5 mg    NERVE BLOCK  10/23/2017    duramorph, duramorph Narrative    No narrative on file       Review of Systems:  Review of Systems   Constitution: Positive for weakness. Negative for chills and fever. Cardiovascular: Negative for chest pain and irregular heartbeat. Respiratory: Negative for cough and shortness of breath. Musculoskeletal: Positive for back pain and neck pain. Gastrointestinal: Negative for constipation. Neurological: Positive for numbness. Negative for disturbances in coordination and loss of balance. Physical Exam:  /81   Pulse 80   Temp 97.3 °F (36.3 °C) (Oral)   Resp 16   Ht 5' 10\" (1.778 m) Comment: 5' 10\"  Wt 225 lb (102.1 kg)   BMI 32.28 kg/m²     Physical Exam   Constitutional: He is oriented to person, place, and time and well-developed, well-nourished, and in no distress. HENT:   Head: Normocephalic. Eyes: EOM are normal.   Neck: Normal range of motion. Pulmonary/Chest: Effort normal.   Musculoskeletal: Normal range of motion. Cervical back: He exhibits tenderness. Lumbar back: He exhibits tenderness. Neurological: He is alert and oriented to person, place, and time. Skin: Skin is warm and dry. Psychiatric: Affect normal.       Record/Diagnostics Review:    MRI Lumbar 2017 -     1. Degenerative changes contribute to mild spinal canal stenosis at L2-L3. No spinal canal stenosis at the remaining levels. 2. A disc protrusion contacts the left S1 nerve root at L5-S1 without  evidence of impingement. 3. Degenerative changes contribute to neural foraminal narrowing as above. Assessment:  Problem List Items Addressed This Visit     Displacement of lumbar intervertebral disc without myelopathy - Primary    Relevant Medications    oxyCODONE (ROXICODONE) 5 MG immediate release tablet (Start on 12/1/2017)    Pain disorder    Relevant Medications    oxyCODONE (ROXICODONE) 5 MG immediate release tablet (Start on 12/1/2017)      Other Visit Diagnoses    None.            Treatment Plan:  DISCUSSION: Treatment options discussed with patient and all questions answered to patient's satisfaction. TREATMENT OPTIONS:   Starting PT for neck next week   Continue reducing oxycodone dose - taking BID  Contract compliance requirements are met. Satisfactory pain management plan. Medications help with personal goals and self-care needs. Follow up appointment scheduled.

## 2023-07-01 ENCOUNTER — HOSPITAL ENCOUNTER (OUTPATIENT)
Dept: MRI IMAGING | Age: 48
End: 2023-07-01
Payer: COMMERCIAL

## 2023-07-01 DIAGNOSIS — M54.50 CHRONIC BILATERAL LOW BACK PAIN WITHOUT SCIATICA: ICD-10-CM

## 2023-07-01 DIAGNOSIS — G89.29 CHRONIC BILATERAL LOW BACK PAIN WITHOUT SCIATICA: ICD-10-CM

## 2023-07-01 DIAGNOSIS — M47.817 LUMBOSACRAL SPONDYLOSIS WITHOUT MYELOPATHY: Chronic | ICD-10-CM

## 2023-07-01 PROCEDURE — 72148 MRI LUMBAR SPINE W/O DYE: CPT

## 2023-07-03 NOTE — OP NOTE
Operative Note      Patient: Tor Chavez  YOB: 1975  MRN: [id number]    Date of Procedure: 7/6/2020    Pre-Op Diagnosis: dx lumbosacral spondylosis w/o myelopathy    chronic markos low back pain w/o sciatica    Post-Op Diagnosis: Same       Procedure(s):  LEFT MEDIAL BRANCH L2-L5 NERVE RADIOFREQUENCY ABLATION    Surgeon(s):  Lucho Delaney MD    Assistant:   * No surgical staff found *    Anesthesia: IV Sedation    Estimated Blood Loss (mL): Minimal    Complications: None    Specimens:   * No specimens in log *    Implants:  * No implants in log *      Drains: * No LDAs found *    Findings:  n/a    Detailed Description of Procedure:   Preoperative Diagnosis: Lumbar spondylosis w/o myelopathy, chronic low back pain  Postoperative Diagnosis: Lumbar spondylosis w/o myelopathy, chronic low back pain  SEDATION: SEE SEDATION NOTE  BLOOD LOSS: NONE    Procedure Performed:  :Radiofrequency ablation of median branches at the Transverse processes of L3 / L4 / L5 and S1 on Left under fluoroscopic guidance with IV sedation. Procedure:    After starting an IV, the patient was taken to procedure room. The patient was placed in prone position and skin over the back was prepped and draped in sterile manner. Standard monitors were connected and vitals were monitored during the case and they remained stable during the procedure. A meaningful communication was kept up with the patient throughout the procedure. Then using fluoroscopy the junction of the transverse process of the target vertebra with the superior process of the facet joint was observed and the view was optimized. The skin and deep tissues were infiltrated with 2 ml of  1 % lidocaine. The RF canula with the 10 mm active tip was introduced through the skin wheal under fluoroscopy guidance such that the tip of the needle lies in the groove of the transverse process with the superior processes of the facet joint.   Then a lateral and AP view of the lumbar spine was obtained to make sure the tip of needle is not in the neural foramen. Then electric impedence was checked to make sure it is acceptable. Then a sensory stimulus was applied at 50 Hz up to 0.5 volt and concordant pain symptoms were reproduced. Then a motor stimulus was applied at 2 Hz up to 2 volts or 3 x times the sensory stimulus and no motor stimulation was seen in lower extremities. Some multifidus stimulus was seen. Then after negative aspiration 1 ml of 4% lidocaine was injected through the needle at each level. The radiofrequency lesion was done at 85 degrees centigrade for 110 seconds. For L5 median branch block the junction of the ala of  the sacrum with the superior articular process of the facet joint was taken as a reference point. For the other levels median branch nerves the junction of the transverse process with the superolateral possible facet joint was taken as a reference point    Patient's vital signs and neurological status remained stable throughout the procedure and post procedural period. The patient tolerated the procedure well and was discharged home in stable condition. Electronically signed by Gita Sutherland MD on 7/6/2020 at 8:45 AM  SEDATION NOTE:    ASA CLASSIFICATION  2  MP   CLASSIFICATION  2    · Moderate intravenous conscious sedation was supervised by Dr. Emilee Sweet  . The patient was independently monitored by a Registered Nurse assigned to the Procedure Room  . Monitoring included automated blood pressure, continuous EKG, Capnography and continuous pulse oximetry. . The detailed Conscious Record is permanently stored in the [place] [id number].      The following is the conscious sedation record;  Start Time:  0828  End times:  0845  Duration:  17 minutes  MEDS GIVEN 2 MG VERSED  MCG FENTANYL [place]

## 2023-07-05 ENCOUNTER — OFFICE VISIT (OUTPATIENT)
Dept: PAIN MANAGEMENT | Age: 48
End: 2023-07-05
Payer: COMMERCIAL

## 2023-07-05 VITALS — BODY MASS INDEX: 38.65 KG/M2 | WEIGHT: 270 LBS | OXYGEN SATURATION: 97 % | HEIGHT: 70 IN | HEART RATE: 93 BPM

## 2023-07-05 DIAGNOSIS — M48.061 SPINAL STENOSIS OF LUMBAR REGION WITHOUT NEUROGENIC CLAUDICATION: ICD-10-CM

## 2023-07-05 DIAGNOSIS — Z79.891 CHRONIC USE OF OPIATE FOR THERAPEUTIC PURPOSE: Primary | ICD-10-CM

## 2023-07-05 DIAGNOSIS — M54.50 LUMBAR PAIN WITH RADIATION DOWN BOTH LEGS: ICD-10-CM

## 2023-07-05 DIAGNOSIS — G89.29 CHRONIC BILATERAL LOW BACK PAIN WITHOUT SCIATICA: ICD-10-CM

## 2023-07-05 DIAGNOSIS — M54.50 CHRONIC BILATERAL LOW BACK PAIN WITHOUT SCIATICA: ICD-10-CM

## 2023-07-05 DIAGNOSIS — Z79.891 CHRONIC USE OF OPIATE DRUG FOR THERAPEUTIC PURPOSE: ICD-10-CM

## 2023-07-05 DIAGNOSIS — M79.605 LUMBAR PAIN WITH RADIATION DOWN BOTH LEGS: ICD-10-CM

## 2023-07-05 DIAGNOSIS — M47.817 LUMBOSACRAL SPONDYLOSIS WITHOUT MYELOPATHY: Chronic | ICD-10-CM

## 2023-07-05 DIAGNOSIS — M79.604 LUMBAR PAIN WITH RADIATION DOWN BOTH LEGS: ICD-10-CM

## 2023-07-05 PROCEDURE — 99213 OFFICE O/P EST LOW 20 MIN: CPT | Performed by: NURSE PRACTITIONER

## 2023-07-05 RX ORDER — BUPROPION HYDROCHLORIDE 300 MG/1
TABLET ORAL
COMMUNITY
Start: 2023-06-03

## 2023-07-05 RX ORDER — GABAPENTIN 300 MG/1
300 CAPSULE ORAL EVERY 8 HOURS SCHEDULED
Qty: 270 CAPSULE | Refills: 0 | Status: SHIPPED | OUTPATIENT
Start: 2023-07-05 | End: 2023-10-03

## 2023-07-05 RX ORDER — TIZANIDINE 4 MG/1
4 TABLET ORAL 2 TIMES DAILY PRN
Qty: 180 TABLET | Refills: 0 | Status: SHIPPED | OUTPATIENT
Start: 2023-07-05 | End: 2023-10-03

## 2023-07-05 RX ORDER — TRAMADOL HYDROCHLORIDE 50 MG/1
50 TABLET ORAL EVERY 8 HOURS PRN
Qty: 90 TABLET | Refills: 1 | Status: SHIPPED | OUTPATIENT
Start: 2023-07-05 | End: 2023-08-04

## 2023-07-05 RX ORDER — HYDROXYZINE 50 MG/1
TABLET, FILM COATED ORAL
COMMUNITY
Start: 2023-06-03

## 2023-07-05 ASSESSMENT — ENCOUNTER SYMPTOMS
BOWEL INCONTINENCE: 0
BACK PAIN: 1

## 2023-07-05 NOTE — PROGRESS NOTES
Chief Complaint   Patient presents with    Back Pain     Tramadol    Follow-up     MRI results     [facility]     Patient complains of low back pain since [id number] with no known injury. He had imaging with 2017 MRI lumbar spine that showed lumbar spondylosis and facet arthropathy. In past he had lumbar steroid injection but steroids were discontinued because of psychiatric issue. He is also using TENS unit and OTC lidocaine cream with good relief. Patient states tramadol and RFA are managing his pain and helping him stay active. Discussed repeating RFA but would like to wait and update imaging feels pain is worsening at times. MRI completed but results n/a to me yet      Procedures  4/22 and 5/22  bilat Radiofrequency ablation of median branches at the Transverse processes of Transverse processes of L4, L5 and SACRAL ALA for L4/5 AND L5/S1 facet 85% relief reported     Back Pain  This is a chronic problem. The current episode started more than 1 year ago. The problem occurs constantly. The problem is unchanged. The pain is present in the lumbar spine. The quality of the pain is described as aching. The pain does not radiate. The pain is at a severity of 2/10. The pain is mild. The pain is Worse during the day. The symptoms are aggravated by bending, twisting and standing. Pertinent negatives include no bladder incontinence, bowel incontinence, numbness or weakness. He has tried bed rest, heat, home exercises, ice, walking and analgesics for the symptoms. The treatment provided mild relief. Patient denies any new neurological symptoms. No bowel or bladder incontinence, no weakness, and no falling. Pill count:6 tabs / appropriate    Morphine equivalent:  15    Periodic Controlled Substance Monitoring: Possible medication side effects, risk of tolerance/dependence & alternative treatments discussed., No signs of potential drug abuse or diversion identified. , Assessed functional status., Obtaining appropriate

## 2023-09-01 ENCOUNTER — OFFICE VISIT (OUTPATIENT)
Dept: PAIN MANAGEMENT | Age: 48
End: 2023-09-01

## 2023-09-01 VITALS — WEIGHT: 270 LBS | HEIGHT: 70 IN | BODY MASS INDEX: 38.65 KG/M2 | OXYGEN SATURATION: 97 % | HEART RATE: 86 BPM

## 2023-09-01 DIAGNOSIS — G89.29 CHRONIC BILATERAL LOW BACK PAIN WITHOUT SCIATICA: ICD-10-CM

## 2023-09-01 DIAGNOSIS — M54.50 CHRONIC BILATERAL LOW BACK PAIN WITHOUT SCIATICA: ICD-10-CM

## 2023-09-01 DIAGNOSIS — Z79.891 CHRONIC USE OF OPIATE FOR THERAPEUTIC PURPOSE: Primary | ICD-10-CM

## 2023-09-01 DIAGNOSIS — M47.817 LUMBOSACRAL SPONDYLOSIS WITHOUT MYELOPATHY: Chronic | ICD-10-CM

## 2023-09-01 RX ORDER — TRAMADOL HYDROCHLORIDE 50 MG/1
50 TABLET ORAL EVERY 8 HOURS PRN
Qty: 90 TABLET | Refills: 1 | Status: SHIPPED | OUTPATIENT
Start: 2023-09-02 | End: 2023-10-02

## 2023-09-01 ASSESSMENT — ENCOUNTER SYMPTOMS
SHORTNESS OF BREATH: 0
BACK PAIN: 1
CONSTIPATION: 0
COUGH: 0

## 2023-09-01 NOTE — PROGRESS NOTES
Chief Complaint   Patient presents with    Back Pain    Medication Refill     Tramadol          [facility]        Patient complains of low back pain since [id number] with no known injury. He has has lumbar steroid injections in past but steroids were discontinued because of psychiatric issue. He is also using TENS unit and OTC lidocaine cream with good relief. Patient states tramadol and RFA are managing his pain and helping him stay active. but feels pain is worsening at times. MRI completed 7/23 Moderate bilateral neural foraminal narrowing at L5-S1, left greater than right, worsened from the previous exam. Mild bilateral neural foraminal narrowing at L4-5, unchanged from the  previous exam. Additional mild multilevel degenerative changes, similar to the previous  exam.    Procedures  4/22 and 5/22  bilat Radiofrequency ablation of median branches at the Transverse processes of Transverse processes of L4, L5 and SACRAL ALA for L4/5 AND L5/S1 facet 85% relief reported  but not ready to repeat d/t lack of transportation     HPI:   Back Pain  This is a chronic problem. The current episode started more than 1 year ago. The problem occurs constantly. The problem is unchanged. The pain is present in the lumbar spine. The quality of the pain is described as aching. The pain does not radiate. The pain is at a severity of 3/10. The pain is mild. The pain is Worse during the day. The symptoms are aggravated by bending, twisting and standing. Pertinent negatives include no bladder incontinence, bowel incontinence, numbness or weakness. He has tried bed rest, heat, home exercises, ice, walking and analgesics for the symptoms. The treatment provided mild relief.       Chief Complaint:  Medication Refill:     Pain score Today:  3  Adverse effects (Constipation / Nausea / Sedation / sexual Dysfunction / others) : no  Mood: good  Sleep pattern and quality: good  Activity level: good     MEDICATION# Tramadol #12  Last

## 2023-09-29 DIAGNOSIS — M47.817 LUMBOSACRAL SPONDYLOSIS WITHOUT MYELOPATHY: Chronic | ICD-10-CM

## 2023-09-29 DIAGNOSIS — M54.50 CHRONIC BILATERAL LOW BACK PAIN WITHOUT SCIATICA: ICD-10-CM

## 2023-09-29 DIAGNOSIS — G89.29 CHRONIC BILATERAL LOW BACK PAIN WITHOUT SCIATICA: ICD-10-CM

## 2023-09-29 RX ORDER — GABAPENTIN 300 MG/1
300 CAPSULE ORAL EVERY 8 HOURS SCHEDULED
Qty: 270 CAPSULE | Refills: 0 | OUTPATIENT
Start: 2023-09-29 | End: 2023-12-28

## 2023-09-29 RX ORDER — GABAPENTIN 300 MG/1
300 CAPSULE ORAL EVERY 8 HOURS SCHEDULED
Qty: 270 CAPSULE | Refills: 0 | Status: CANCELLED | OUTPATIENT
Start: 2023-09-29 | End: 2023-12-28

## 2023-09-29 RX ORDER — TIZANIDINE 4 MG/1
4 TABLET ORAL 2 TIMES DAILY PRN
Qty: 180 TABLET | Refills: 0 | Status: SHIPPED | OUTPATIENT
Start: 2023-09-29 | End: 2023-12-28

## 2023-09-29 RX ORDER — TIZANIDINE 4 MG/1
4 TABLET ORAL 2 TIMES DAILY PRN
Qty: 180 TABLET | Refills: 0 | OUTPATIENT
Start: 2023-09-29 | End: 2023-12-28

## 2023-09-29 RX ORDER — TIZANIDINE 4 MG/1
4 TABLET ORAL 2 TIMES DAILY PRN
Qty: 180 TABLET | Refills: 0 | Status: CANCELLED | OUTPATIENT
Start: 2023-09-29 | End: 2023-12-28

## 2023-09-29 RX ORDER — GABAPENTIN 300 MG/1
300 CAPSULE ORAL EVERY 8 HOURS SCHEDULED
Qty: 270 CAPSULE | Refills: 0 | Status: SHIPPED | OUTPATIENT
Start: 2023-09-29 | End: 2023-12-28

## 2023-09-29 NOTE — TELEPHONE ENCOUNTER
Pt next appointment is not till 11/03/2023 nad he is in need to refill on Gabapentin and tizanidine please advise

## 2023-10-31 DIAGNOSIS — G89.29 CHRONIC BILATERAL LOW BACK PAIN WITHOUT SCIATICA: ICD-10-CM

## 2023-10-31 DIAGNOSIS — M47.817 LUMBOSACRAL SPONDYLOSIS WITHOUT MYELOPATHY: Chronic | ICD-10-CM

## 2023-10-31 DIAGNOSIS — M54.50 CHRONIC BILATERAL LOW BACK PAIN WITHOUT SCIATICA: ICD-10-CM

## 2023-10-31 RX ORDER — TRAMADOL HYDROCHLORIDE 50 MG/1
50 TABLET ORAL EVERY 8 HOURS PRN
Qty: 90 TABLET | Refills: 1 | OUTPATIENT
Start: 2023-10-31 | End: 2023-11-30

## 2023-11-03 ENCOUNTER — OFFICE VISIT (OUTPATIENT)
Dept: PAIN MANAGEMENT | Age: 48
End: 2023-11-03
Payer: COMMERCIAL

## 2023-11-03 VITALS — HEIGHT: 70 IN | WEIGHT: 270 LBS | BODY MASS INDEX: 38.65 KG/M2 | HEART RATE: 98 BPM | OXYGEN SATURATION: 99 %

## 2023-11-03 DIAGNOSIS — M54.50 CHRONIC BILATERAL LOW BACK PAIN WITHOUT SCIATICA: ICD-10-CM

## 2023-11-03 DIAGNOSIS — M47.817 LUMBOSACRAL SPONDYLOSIS WITHOUT MYELOPATHY: Chronic | ICD-10-CM

## 2023-11-03 DIAGNOSIS — Z79.891 CHRONIC USE OF OPIATE FOR THERAPEUTIC PURPOSE: Primary | ICD-10-CM

## 2023-11-03 DIAGNOSIS — G89.29 CHRONIC BILATERAL LOW BACK PAIN WITHOUT SCIATICA: ICD-10-CM

## 2023-11-03 PROCEDURE — 99213 OFFICE O/P EST LOW 20 MIN: CPT | Performed by: NURSE PRACTITIONER

## 2023-11-03 RX ORDER — TRAMADOL HYDROCHLORIDE 50 MG/1
50 TABLET ORAL EVERY 8 HOURS PRN
Qty: 90 TABLET | Refills: 1 | Status: SHIPPED | OUTPATIENT
Start: 2023-11-04 | End: 2023-12-04

## 2023-11-03 ASSESSMENT — ENCOUNTER SYMPTOMS
COUGH: 0
CONSTIPATION: 0
BACK PAIN: 1
SHORTNESS OF BREATH: 0
RESPIRATORY NEGATIVE: 1
GASTROINTESTINAL NEGATIVE: 1

## 2023-11-03 NOTE — PROGRESS NOTES
Chief Complaint   Patient presents with    Back Pain     Tramadol     Medication Refill: Tramadol       [facility]     Patient complains of low back pain since [id number] with no known injury. He has has lumbar steroid injections in past but steroids were discontinued because of psychiatric issue. He is also using TENS unit and OTC lidocaine cream with good relief. Patient states tramadol and RFA are managing his pain and helping him stay active. but feels pain is worsening at times. MRI completed 7/23 Moderate bilateral neural foraminal narrowing at L5-S1, left greater than right, worsened from the previous exam. Mild bilateral neural foraminal narrowing at L4-5, unchanged from the  previous exam. Additional mild multilevel degenerative changes, similar to the previous  exam.     Procedures  4/22 and 5/22  bilat Radiofrequency ablation of median branches at the Transverse processes of Transverse processes of L4, L5 and SACRAL ALA for L4/5 AND L5/S1 facet 85% relief reported  but not ready to repeat d/t lack of transportation      HPI:   Back Pain  This is a chronic problem. The current episode started more than 1 year ago. The problem occurs constantly. The problem is unchanged. The pain is present in the lumbar spine. The quality of the pain is described as aching. The pain does not radiate. The pain is at a severity of 2/10. The pain is mild. The pain is Worse during the day. The symptoms are aggravated by bending, twisting and standing. Pertinent negatives include no bladder incontinence, bowel incontinence, numbness or weakness. He has tried bed rest, heat, home exercises, ice, walking and analgesics for the symptoms. The treatment provided mild relief.         Pain score Today:  2  Adverse effects (Constipation / Nausea / Sedation / sexual Dysfunction / others) : no  Mood: good  Sleep pattern and quality: good  Activity level: good    Pill count Today:4 was due 10/29 prescription adjusted

## 2023-12-04 DIAGNOSIS — M54.50 CHRONIC BILATERAL LOW BACK PAIN WITHOUT SCIATICA: ICD-10-CM

## 2023-12-04 DIAGNOSIS — G89.29 CHRONIC BILATERAL LOW BACK PAIN WITHOUT SCIATICA: ICD-10-CM

## 2023-12-04 DIAGNOSIS — M47.817 LUMBOSACRAL SPONDYLOSIS WITHOUT MYELOPATHY: Chronic | ICD-10-CM

## 2023-12-04 RX ORDER — TRAMADOL HYDROCHLORIDE 50 MG/1
50 TABLET ORAL EVERY 8 HOURS PRN
Qty: 90 TABLET | Refills: 1 | OUTPATIENT
Start: 2023-12-04 | End: 2024-01-03

## 2024-01-01 NOTE — H&P
by mouth as needed for Pain. No current facility-administered medications for this visit. Facility-Administered Medications Ordered in Other Visits   Medication Dose Route Frequency Provider Last Rate Last Dose    sodium chloride flush 0.9 % injection 10 mL  10 mL Intravenous 2 times per day Huan Arango MD        sodium chloride flush 0.9 % injection 10 mL  10 mL Intravenous PRN Huan Arango MD             Review of Systems   Constitutional: Negative. Respiratory: Negative. Musculoskeletal: Positive for back pain, neck pain and neck stiffness. Neurological: Negative. Objective:  General Appearance:  Well-appearing and in no acute distress. Vital signs: (most recent): Height 5' 10\" (1.778 m), weight 250 lb (113.4 kg). Output: Producing urine and producing stool. HEENT: (No visible masses in neck  Range of motion appears normal on cervical spine  External ears appears normal)    Lungs:  Normal effort. He is not in respiratory distress. Neurological: Patient is alert and oriented to person, place and time.  (Able to follow command     Psych  Mood is good  Affect is normal). Skin:  No rash or cyanosis. Assessment & Plan  1. Chronic use of opiate drug for therapeutic purpose    2. Chronic bilateral low back pain without sciatica    3.  Lumbosacral spondylosis without myelopathy    Chronic axial nonradicular lumbar spinal pain progressively worsened over several years  Refractory to conservative measures therapy and medication        -Imaging showed multilevel lumbar facet arthropathy  Patient had 80% plus relief for more than 6 months with radiofrequency ablation  As pain is returning back to the baseline  We will schedule patient for repeat lumbar radiofrequency ablation     Automated prescription report reviewed  Safe use of medication discussed with patient  Refill ordered  Contract requirement met      Orders Placed This Encounter   Procedures    FACET JOINT L/S SINGLE    FACET JOINT L/S SINGLE      Encounter Medications    Orders Placed This Encounter   Medications    gabapentin (NEURONTIN) 300 MG capsule       Sig: Take 1 capsule by mouth every 8 hours for 30 days. Dispense:  90 capsule       Refill:  0    traMADol (ULTRAM) 50 MG tablet       Sig: Take 1 tablet by mouth every 8 hours as needed for Pain for up to 30 days. Fill 1/29/20       Dispense:  90 tablet       Refill:  0       Reduce doses taken as pain becomes manageable    cyclobenzaprine (FLEXERIL) 10 MG tablet       Sig: Take 1 tablet by mouth 2 times daily as needed for Muscle spasms       Dispense:  60 tablet       Refill:  1         Controlled Substance Monitoring:     Acute and Chronic Pain Monitoring:   RX Monitoring 5/27/2020   Attestation -   Periodic Controlled Substance Monitoring Possible medication side effects, risk of tolerance/dependence & alternative treatments discussed. ;No signs of potential drug abuse or diversion identified. ;Assessed functional status. ;Obtaining appropriate analgesic effect of treatment. Chronic Pain > 50 MEDD Obtained or confirmed \"Consent for Opioid Use\" on file. Chronic Pain > 80 MEDD -            Hailee Holloway is a 53 y.o. male being evaluated by a Virtual Visit (video visit) encounter to address concerns as mentioned above. A caregiver was present when appropriate. Due to this being a TeleHealth encounter ([facility]), evaluation of the following organ systems was limited: Vitals/Constitutional/EENT/Resp/CV/GI//MS/Neuro/Skin/Heme-Lymph-Imm. Pursuant to the emergency declaration under the [address] [place] [address], [address] [place] [address] authority and the [employer] Act, this Virtual Visit was conducted with patient's (and/or legal guardian's) consent, to reduce the patient's risk of exposure to COVID-19 and provide necessary medical care.   The patient (and/or legal guardian) has also been advised to contact this office for worsening conditions or problems, and seek emergency medical treatment and/or call 911 if deemed necessary. Patient identification was verified at the start of the visit: Yes     Total time spent for this encounter: Not billed by time     Services were provided through a video synchronous discussion virtually to substitute for in-person clinic visit. Patient and provider were located at their individual homes. --Jake John MD on 5/27/2020 at 3:16 PM     An electronic signature was used to authenticate this note.         Electronically signed by Jake John MD on 5/27/2020 at 3:16 PM               Revision History 22-Aug-2023

## 2024-01-05 ENCOUNTER — OFFICE VISIT (OUTPATIENT)
Dept: PAIN MANAGEMENT | Age: 49
End: 2024-01-05
Payer: COMMERCIAL

## 2024-01-05 ENCOUNTER — HOSPITAL ENCOUNTER (OUTPATIENT)
Age: 49
Setting detail: SPECIMEN
Discharge: HOME | End: 2024-01-05

## 2024-01-05 VITALS — WEIGHT: 270 LBS | BODY MASS INDEX: 38.65 KG/M2 | OXYGEN SATURATION: 97 % | HEART RATE: 97 BPM | HEIGHT: 70 IN

## 2024-01-05 DIAGNOSIS — Z79.891 CHRONIC USE OF OPIATE DRUG FOR THERAPEUTIC PURPOSE: Primary | ICD-10-CM

## 2024-01-05 DIAGNOSIS — M47.817 LUMBOSACRAL SPONDYLOSIS WITHOUT MYELOPATHY: Chronic | ICD-10-CM

## 2024-01-05 DIAGNOSIS — M54.50 CHRONIC BILATERAL LOW BACK PAIN WITHOUT SCIATICA: ICD-10-CM

## 2024-01-05 DIAGNOSIS — G89.29 CHRONIC BILATERAL LOW BACK PAIN WITHOUT SCIATICA: ICD-10-CM

## 2024-01-05 PROCEDURE — 99214 OFFICE O/P EST MOD 30 MIN: CPT | Performed by: NURSE PRACTITIONER

## 2024-01-05 RX ORDER — TRAMADOL HYDROCHLORIDE 50 MG/1
50 TABLET ORAL EVERY 8 HOURS PRN
Qty: 90 TABLET | Refills: 1 | Status: SHIPPED | OUTPATIENT
Start: 2024-01-09 | End: 2024-02-08

## 2024-01-05 RX ORDER — GABAPENTIN 300 MG/1
300 CAPSULE ORAL EVERY 8 HOURS SCHEDULED
Qty: 270 CAPSULE | Refills: 0 | Status: SHIPPED | OUTPATIENT
Start: 2024-01-05 | End: 2024-04-04

## 2024-01-05 RX ORDER — TIZANIDINE 4 MG/1
4 TABLET ORAL 2 TIMES DAILY PRN
Qty: 180 TABLET | Refills: 0 | Status: SHIPPED | OUTPATIENT
Start: 2024-01-05 | End: 2024-04-04

## 2024-01-05 ASSESSMENT — ENCOUNTER SYMPTOMS
CONSTIPATION: 0
BACK PAIN: 1
SHORTNESS OF BREATH: 0
COUGH: 0

## 2024-01-05 NOTE — PROGRESS NOTES
and history of present illness (including ROS and PFSH) and vital documentation by my staff and I agree with their documentation and have added where applicable.

## 2024-01-06 LAB
6-ACETYLMORPHINE, UR: NORMAL
7-AMINOCLONAZEPAM, URINE: NORMAL
ALPHA-OH-ALPRAZ, URINE: NORMAL
ALPHA-OH-MIDAZOLAM, URINE: NORMAL
ALPRAZOLAM, URINE: NORMAL
AMPHETAMINES, URINE: NORMAL
BARBITURATES, URINE: NORMAL
BENZOYLECGONINE, UR: NORMAL
BUPRENORPHINE URINE: NORMAL
CARISOPRODOL, UR: NORMAL
CLONAZEPAM, URINE: NORMAL
CODEINE, URINE: NORMAL
CREAT UR-MCNC: NORMAL MG/DL
DIAZEPAM, URINE: NORMAL
DRUGS EXPECTED, UR: NORMAL
EER HI RES INTERP UR: NORMAL
ETHYL GLUCURONIDE UR: NORMAL
FENTANYL URINE: NORMAL
GABAPENTIN: NORMAL
HYDROCODONE, OPI6M: NORMAL
HYDROMORPHONE, OPI3M: NORMAL
LORAZEPAM, URINE: NORMAL
MARIJUANA METAB, UR: NORMAL
MDA, UR: NORMAL
MDEA, EVE, UR: NORMAL
MDMA URINE: NORMAL
MEPERIDINE METAB, UR: NORMAL
METHADONE, URINE: NORMAL
METHAMPHETAMINE, URINE: NORMAL
METHYLPHENIDATE: NORMAL
MIDAZOLAM, URINE: NORMAL
MORPHINE, OPI1M: NORMAL
NALOXONE URINE: NORMAL
NORBUPRENORPHINE, URINE: NORMAL
NORDIAZEPAM, URINE: NORMAL
NORFENTANYL, URINE: NORMAL
NORHYDROCODONE, URINE: NORMAL
NOROXYCODONE, URINE: NORMAL
NOROXYMORPHONE, URINE: NORMAL
OXAZEPAM, URINE: NORMAL
OXYCODONE URINE: NORMAL
OXYMORPHONE, URINE: NORMAL
PAIN MANAGEMENT DRUG PANEL INTERP, URINE: NORMAL
PAIN MGT DRUG PANEL, HI RES, UR: NORMAL
PCP,URINE: NORMAL
PHENTERMINE, UR: NORMAL
PREGABALIN: NORMAL
TAPENTADOL, URINE: NORMAL
TAPENTADOL-O-SULFATE, URINE: NORMAL
TEMAZEPAM, URINE: NORMAL
TRAMADOL, URINE: NORMAL
ZOLPIDEM METABOLITE (ZCA), URINE: NORMAL
ZOLPIDEM, URINE: NORMAL

## 2024-01-10 LAB
6-ACETYLMORPHINE, UR: NOT DETECTED
7-AMINOCLONAZEPAM, URINE: NOT DETECTED
ALPHA-OH-ALPRAZ, URINE: NOT DETECTED
ALPHA-OH-MIDAZOLAM, URINE: NOT DETECTED
ALPRAZOLAM, URINE: NOT DETECTED
AMPHETAMINES, URINE: NOT DETECTED
BARBITURATES, URINE: NEGATIVE
BENZOYLECGONINE, UR: NEGATIVE
BUPRENORPHINE URINE: NOT DETECTED
CARISOPRODOL, UR: NEGATIVE
CLONAZEPAM, URINE: NOT DETECTED
CODEINE, URINE: NOT DETECTED
CREAT UR-MCNC: 119.4 MG/DL (ref 20–400)
DIAZEPAM, URINE: NOT DETECTED
DRUGS EXPECTED, UR: NORMAL
EER HI RES INTERP UR: NORMAL
ETHYL GLUCURONIDE UR: NEGATIVE
FENTANYL URINE: NOT DETECTED
GABAPENTIN: PRESENT
HYDROCODONE, OPI6M: NOT DETECTED
HYDROMORPHONE, OPI3M: NOT DETECTED
LORAZEPAM, URINE: NOT DETECTED
MARIJUANA METAB, UR: NEGATIVE
MDA, UR: NOT DETECTED
MDEA, EVE, UR: NOT DETECTED
MDMA URINE: NOT DETECTED
MEPERIDINE METAB, UR: NOT DETECTED
METHADONE, URINE: NEGATIVE
METHAMPHETAMINE, URINE: NOT DETECTED
METHYLPHENIDATE: NOT DETECTED
MIDAZOLAM, URINE: NOT DETECTED
MORPHINE, OPI1M: NOT DETECTED
NALOXONE URINE: NOT DETECTED
NORBUPRENORPHINE, URINE: NOT DETECTED
NORDIAZEPAM, URINE: NOT DETECTED
NORFENTANYL, URINE: NOT DETECTED
NORHYDROCODONE, URINE: NOT DETECTED
NOROXYCODONE, URINE: NOT DETECTED
NOROXYMORPHONE, URINE: NOT DETECTED
OXAZEPAM, URINE: NOT DETECTED
OXYCODONE URINE: NOT DETECTED
OXYMORPHONE, URINE: NOT DETECTED
PAIN MANAGEMENT DRUG PANEL INTERP, URINE: NORMAL
PAIN MGT DRUG PANEL, HI RES, UR: NORMAL
PCP,URINE: NEGATIVE
PHENTERMINE, UR: NOT DETECTED
PREGABALIN: NOT DETECTED
TAPENTADOL, URINE: NOT DETECTED
TAPENTADOL-O-SULFATE, URINE: NOT DETECTED
TEMAZEPAM, URINE: NOT DETECTED
TRAMADOL, URINE: NORMAL
ZOLPIDEM METABOLITE (ZCA), URINE: NOT DETECTED
ZOLPIDEM, URINE: NOT DETECTED

## 2024-03-04 ENCOUNTER — OFFICE VISIT (OUTPATIENT)
Dept: PAIN MANAGEMENT | Age: 49
End: 2024-03-04
Payer: COMMERCIAL

## 2024-03-04 VITALS — BODY MASS INDEX: 38.65 KG/M2 | OXYGEN SATURATION: 97 % | WEIGHT: 270 LBS | HEART RATE: 101 BPM | HEIGHT: 70 IN

## 2024-03-04 DIAGNOSIS — G89.29 CHRONIC BILATERAL LOW BACK PAIN WITHOUT SCIATICA: ICD-10-CM

## 2024-03-04 DIAGNOSIS — M54.50 CHRONIC BILATERAL LOW BACK PAIN WITHOUT SCIATICA: ICD-10-CM

## 2024-03-04 DIAGNOSIS — M47.817 LUMBOSACRAL SPONDYLOSIS WITHOUT MYELOPATHY: Chronic | ICD-10-CM

## 2024-03-04 DIAGNOSIS — Z79.891 CHRONIC USE OF OPIATE FOR THERAPEUTIC PURPOSE: Primary | ICD-10-CM

## 2024-03-04 PROCEDURE — 99214 OFFICE O/P EST MOD 30 MIN: CPT | Performed by: NURSE PRACTITIONER

## 2024-03-04 RX ORDER — TIZANIDINE 4 MG/1
4 TABLET ORAL 2 TIMES DAILY PRN
Qty: 180 TABLET | Refills: 0 | Status: SHIPPED | OUTPATIENT
Start: 2024-03-04 | End: 2024-06-02

## 2024-03-04 RX ORDER — TRAMADOL HYDROCHLORIDE 50 MG/1
50 TABLET ORAL EVERY 8 HOURS PRN
Qty: 90 TABLET | Refills: 1 | Status: SHIPPED | OUTPATIENT
Start: 2024-03-06 | End: 2024-04-05

## 2024-03-04 RX ORDER — GABAPENTIN 300 MG/1
300 CAPSULE ORAL EVERY 8 HOURS SCHEDULED
Qty: 270 CAPSULE | Refills: 0 | Status: SHIPPED | OUTPATIENT
Start: 2024-03-04 | End: 2024-06-02

## 2024-03-04 ASSESSMENT — ENCOUNTER SYMPTOMS
COUGH: 0
VOMITING: 0
DIARRHEA: 0
SHORTNESS OF BREATH: 0
BACK PAIN: 1
NAUSEA: 0
CONSTIPATION: 0

## 2024-03-04 NOTE — PROGRESS NOTES
1/2 to 1 ppd   Vaping Use    Vaping Use: Every day    Start date: 2/17/2019    Substances: Nicotine, Flavoring    Devices: Refillable tank   Substance and Sexual Activity    Alcohol use: No     Alcohol/week: 0.0 standard drinks of alcohol    Drug use: No    Sexual activity: Not on file   Other Topics Concern    Not on file   Social History Narrative    Not on file     Social Determinants of Health     Financial Resource Strain: Not on file   Food Insecurity: Not on file   Transportation Needs: Not on file   Physical Activity: Not on file   Stress: Not on file   Social Connections: Not on file   Intimate Partner Violence: Not on file   Housing Stability: Not on file       Review of Systems:  Review of Systems   Constitutional: Negative for chills, fever and malaise/fatigue.   Cardiovascular:  Negative for chest pain.   Respiratory:  Negative for cough and shortness of breath.    Musculoskeletal:  Positive for back pain, muscle cramps and stiffness. Negative for falls and muscle weakness.   Gastrointestinal:  Negative for constipation, diarrhea, nausea and vomiting.   Neurological:  Negative for dizziness, headaches, loss of balance and numbness.       Physical Exam:  Ht 1.778 m (5' 10\")   Wt 122.5 kg (270 lb)   BMI 38.74 kg/m²     Physical Exam  Constitutional:       General: He is not in acute distress.     Appearance: Normal appearance. He is obese.   Pulmonary:      Effort: Pulmonary effort is normal.   Musculoskeletal:         General: Normal range of motion.   Skin:     General: Skin is warm and dry.   Neurological:      Mental Status: He is alert and oriented to person, place, and time.   Psychiatric:         Mood and Affect: Mood normal.         Behavior: Behavior normal.             Assessment:  Problem List Items Addressed This Visit          Other    Lumbosacral spondylosis without myelopathy (Chronic)    Chronic back pain     Other Visit Diagnoses       Chronic use of opiate for therapeutic purpose

## 2024-05-03 ENCOUNTER — OFFICE VISIT (OUTPATIENT)
Dept: PAIN MANAGEMENT | Age: 49
End: 2024-05-03
Payer: COMMERCIAL

## 2024-05-03 VITALS — OXYGEN SATURATION: 97 % | HEART RATE: 79 BPM | BODY MASS INDEX: 38.65 KG/M2 | HEIGHT: 70 IN | WEIGHT: 270 LBS

## 2024-05-03 DIAGNOSIS — Z79.891 CHRONIC USE OF OPIATE FOR THERAPEUTIC PURPOSE: Primary | ICD-10-CM

## 2024-05-03 DIAGNOSIS — M47.817 LUMBOSACRAL SPONDYLOSIS WITHOUT MYELOPATHY: Chronic | ICD-10-CM

## 2024-05-03 DIAGNOSIS — M54.50 CHRONIC BILATERAL LOW BACK PAIN WITHOUT SCIATICA: ICD-10-CM

## 2024-05-03 DIAGNOSIS — G89.29 CHRONIC BILATERAL LOW BACK PAIN WITHOUT SCIATICA: ICD-10-CM

## 2024-05-03 PROCEDURE — 99213 OFFICE O/P EST LOW 20 MIN: CPT | Performed by: NURSE PRACTITIONER

## 2024-05-03 RX ORDER — TIZANIDINE 4 MG/1
4 TABLET ORAL 2 TIMES DAILY PRN
Qty: 180 TABLET | Refills: 0 | Status: SHIPPED | OUTPATIENT
Start: 2024-05-03 | End: 2024-08-01

## 2024-05-03 RX ORDER — GABAPENTIN 300 MG/1
300 CAPSULE ORAL EVERY 8 HOURS SCHEDULED
Qty: 270 CAPSULE | Refills: 0 | Status: SHIPPED | OUTPATIENT
Start: 2024-05-03 | End: 2024-08-01

## 2024-05-03 RX ORDER — TRAMADOL HYDROCHLORIDE 50 MG/1
50 TABLET ORAL EVERY 8 HOURS PRN
Qty: 90 TABLET | Refills: 1 | Status: SHIPPED | OUTPATIENT
Start: 2024-05-10 | End: 2024-06-09

## 2024-05-03 RX ORDER — CARIPRAZINE 3 MG/1
3 CAPSULE, GELATIN COATED ORAL DAILY
COMMUNITY
Start: 2024-05-02

## 2024-05-03 ASSESSMENT — ENCOUNTER SYMPTOMS
CONSTIPATION: 0
SHORTNESS OF BREATH: 0
BACK PAIN: 1
GASTROINTESTINAL NEGATIVE: 1
COUGH: 0
RESPIRATORY NEGATIVE: 1

## 2024-05-03 NOTE — PROGRESS NOTES
Chief Complaint   Patient presents with    Back Pain       Tramadol          [facility]     Patient complains of low back pain since [id number] with no known injury.   He has has lumbar steroid injections in past but steroids were discontinued because of psychiatric issue.    He is also using TENS unit and OTC lidocaine cream with good relief.   Patient states tramadol and RFA are managing his pain and helping him stay active. but feels pain is worsening at times.   MRI completed 7/23 Moderate bilateral neural foraminal narrowing at L5-S1, left greater than right, worsened from the previous exam. Mild bilateral neural foraminal narrowing at L4-5, unchanged from the  previous exam. Additional mild multilevel degenerative changes, similar to the previous  exam.     Procedures  4/22 and 5/22  bilat Radiofrequency ablation of median branches at the Transverse processes of Transverse processes of L4, L5 and SACRAL ALA for L4/5 AND L5/S1 facet 85% relief reported  but not ready to repeat d/t lack of transportation      HPI:   Back Pain  This is a chronic problem. The current episode started more than 1 year ago. The problem occurs constantly. The problem is unchanged. The pain is present in the lumbar spine. The quality of the pain is described as aching. The pain does not radiate. The pain is at a severity of 2/10. The pain is mild. The pain is Worse during the day. The symptoms are aggravated by bending, twisting and standing. Pertinent negatives include no bladder incontinence, bowel incontinence, numbness or weakness. He has tried bed rest, heat, home exercises, ice, walking and analgesics for the symptoms. The treatment provided mild relief.      Medication Refill: Tramadol    Pain score Today:  2  Adverse effects (Constipation / Nausea / Sedation / sexual Dysfunction / others) : no  Mood: good  Sleep pattern and quality: good  Activity level: good    Pill count Today:23 due today prescription adjusted appropriately

## 2024-07-08 ENCOUNTER — OFFICE VISIT (OUTPATIENT)
Dept: PAIN MANAGEMENT | Age: 49
End: 2024-07-08
Payer: COMMERCIAL

## 2024-07-08 VITALS — HEIGHT: 70 IN | OXYGEN SATURATION: 96 % | WEIGHT: 270 LBS | HEART RATE: 92 BPM | BODY MASS INDEX: 38.65 KG/M2

## 2024-07-08 DIAGNOSIS — Z79.891 CHRONIC USE OF OPIATE DRUG FOR THERAPEUTIC PURPOSE: Primary | ICD-10-CM

## 2024-07-08 DIAGNOSIS — M48.061 SPINAL STENOSIS OF LUMBAR REGION WITHOUT NEUROGENIC CLAUDICATION: ICD-10-CM

## 2024-07-08 DIAGNOSIS — M54.50 CHRONIC BILATERAL LOW BACK PAIN WITHOUT SCIATICA: ICD-10-CM

## 2024-07-08 DIAGNOSIS — M47.817 LUMBOSACRAL SPONDYLOSIS WITHOUT MYELOPATHY: Chronic | ICD-10-CM

## 2024-07-08 DIAGNOSIS — G89.29 CHRONIC BILATERAL LOW BACK PAIN WITHOUT SCIATICA: ICD-10-CM

## 2024-07-08 PROCEDURE — 99213 OFFICE O/P EST LOW 20 MIN: CPT | Performed by: NURSE PRACTITIONER

## 2024-07-08 RX ORDER — TIZANIDINE 4 MG/1
4 TABLET ORAL 2 TIMES DAILY PRN
Qty: 180 TABLET | Refills: 0 | Status: SHIPPED | OUTPATIENT
Start: 2024-07-08 | End: 2024-10-06

## 2024-07-08 RX ORDER — GABAPENTIN 300 MG/1
300 CAPSULE ORAL EVERY 8 HOURS SCHEDULED
Qty: 270 CAPSULE | Refills: 0 | Status: SHIPPED | OUTPATIENT
Start: 2024-07-08 | End: 2024-10-06

## 2024-07-08 RX ORDER — TRAMADOL HYDROCHLORIDE 50 MG/1
50 TABLET ORAL EVERY 8 HOURS PRN
Qty: 90 TABLET | Refills: 1 | Status: SHIPPED | OUTPATIENT
Start: 2024-07-10 | End: 2024-08-09

## 2024-07-08 RX ORDER — CARIPRAZINE 4.5 MG/1
4.5 CAPSULE, GELATIN COATED ORAL DAILY
COMMUNITY
Start: 2024-06-27

## 2024-07-08 ASSESSMENT — ENCOUNTER SYMPTOMS
VOMITING: 0
COUGH: 0
DIARRHEA: 0
CONSTIPATION: 0
SHORTNESS OF BREATH: 0
NAUSEA: 0
BACK PAIN: 1

## 2024-07-08 NOTE — PROGRESS NOTES
Chief Complaint   Patient presents with    Back Pain    Medication Refill     Chief Complaint:  Medication Refill:     [facility]     Patient complains of low back pain since [id number] with no known injury.   He has has lumbar steroid injections in past but steroids were discontinued because of psychiatric issue.    He is also using TENS unit and OTC lidocaine cream with good relief.   Patient states tramadol and RFA are managing his pain and helping him stay active. but feels pain is worsening at times.   MRI completed 7/23 Moderate bilateral neural foraminal narrowing at L5-S1, left greater than right, worsened from the previous exam. Mild bilateral neural foraminal narrowing at L4-5, unchanged from the  previous exam. Additional mild multilevel degenerative changes, similar to the previous  exam.     Procedures  4/22 and 5/22  bilat Radiofrequency ablation of median branches at the Transverse processes of Transverse processes of L4, L5 and SACRAL ALA for L4/5 AND L5/S1 facet 85% relief reported  but not ready to repeat d/t lack of transportation      HPI:   Back Pain  This is a chronic problem. The current episode started more than 1 year ago. The problem occurs constantly. The problem is unchanged. The pain is present in the lumbar spine. The quality of the pain is described as aching. The pain does not radiate. The pain is at a severity of 1/10. The pain is mild. The pain is Worse during the day. The symptoms are aggravated by bending, twisting and standing. Pertinent negatives include no bladder incontinence, bowel incontinence, numbness or weakness. He has tried bed rest, heat, home exercises, ice, walking and analgesics for the symptoms. The treatment provided mild relief.          Pain score Today:  1  Adverse effects (Constipation / Nausea / Sedation / sexual Dysfunction / others) : no  Mood: good  Sleep pattern and quality: poor  Activity level: good    Pill count Today:7 due 7/6 prescription adjusted

## 2024-08-08 ENCOUNTER — TELEPHONE (OUTPATIENT)
Dept: PAIN MANAGEMENT | Age: 49
End: 2024-08-08

## 2024-08-08 DIAGNOSIS — G89.29 CHRONIC BILATERAL LOW BACK PAIN WITHOUT SCIATICA: ICD-10-CM

## 2024-08-08 DIAGNOSIS — M54.50 CHRONIC BILATERAL LOW BACK PAIN WITHOUT SCIATICA: ICD-10-CM

## 2024-08-08 DIAGNOSIS — M47.817 LUMBOSACRAL SPONDYLOSIS WITHOUT MYELOPATHY: Chronic | ICD-10-CM

## 2024-08-08 RX ORDER — GABAPENTIN 300 MG/1
300 CAPSULE ORAL EVERY 8 HOURS SCHEDULED
Qty: 270 CAPSULE | Refills: 0 | Status: SHIPPED | OUTPATIENT
Start: 2024-08-08 | End: 2024-11-06

## 2024-09-09 ENCOUNTER — OFFICE VISIT (OUTPATIENT)
Dept: PAIN MANAGEMENT | Age: 49
End: 2024-09-09
Payer: COMMERCIAL

## 2024-09-09 VITALS — HEIGHT: 70 IN | BODY MASS INDEX: 38.65 KG/M2 | WEIGHT: 270 LBS

## 2024-09-09 DIAGNOSIS — M54.2 NECK PAIN, CHRONIC: Primary | Chronic | ICD-10-CM

## 2024-09-09 DIAGNOSIS — G89.29 CHRONIC BILATERAL LOW BACK PAIN WITHOUT SCIATICA: ICD-10-CM

## 2024-09-09 DIAGNOSIS — G89.29 NECK PAIN, CHRONIC: Primary | Chronic | ICD-10-CM

## 2024-09-09 DIAGNOSIS — M54.50 CHRONIC BILATERAL LOW BACK PAIN WITHOUT SCIATICA: ICD-10-CM

## 2024-09-09 DIAGNOSIS — M47.817 LUMBOSACRAL SPONDYLOSIS WITHOUT MYELOPATHY: Chronic | ICD-10-CM

## 2024-09-09 DIAGNOSIS — Z79.891 CHRONIC USE OF OPIATE DRUG FOR THERAPEUTIC PURPOSE: ICD-10-CM

## 2024-09-09 PROCEDURE — 99213 OFFICE O/P EST LOW 20 MIN: CPT | Performed by: NURSE PRACTITIONER

## 2024-09-09 RX ORDER — QUETIAPINE 150 MG/1
150 TABLET, FILM COATED, EXTENDED RELEASE ORAL DAILY
COMMUNITY

## 2024-09-09 RX ORDER — TRAMADOL HYDROCHLORIDE 50 MG/1
50 TABLET ORAL EVERY 8 HOURS PRN
Qty: 90 TABLET | Refills: 1 | Status: SHIPPED | OUTPATIENT
Start: 2024-09-10 | End: 2024-10-10

## 2024-09-09 RX ORDER — GABAPENTIN 300 MG/1
300 CAPSULE ORAL EVERY 8 HOURS SCHEDULED
Qty: 270 CAPSULE | Refills: 0 | Status: SHIPPED | OUTPATIENT
Start: 2024-09-09 | End: 2024-12-08

## 2024-09-09 ASSESSMENT — ENCOUNTER SYMPTOMS
SHORTNESS OF BREATH: 0
BOWEL INCONTINENCE: 0
COUGH: 0
BACK PAIN: 1
CONSTIPATION: 0

## 2024-09-24 ENCOUNTER — HOSPITAL ENCOUNTER (EMERGENCY)
Age: 49
Discharge: HOME | End: 2024-09-25
Attending: STUDENT IN AN ORGANIZED HEALTH CARE EDUCATION/TRAINING PROGRAM
Payer: COMMERCIAL

## 2024-09-24 ENCOUNTER — APPOINTMENT (OUTPATIENT)
Dept: CT IMAGING | Age: 49
End: 2024-09-24
Payer: COMMERCIAL

## 2024-09-24 ENCOUNTER — APPOINTMENT (OUTPATIENT)
Dept: MRI IMAGING | Age: 49
End: 2024-09-24
Payer: COMMERCIAL

## 2024-09-24 VITALS
HEIGHT: 70 IN | BODY MASS INDEX: 38.94 KG/M2 | OXYGEN SATURATION: 98 % | SYSTOLIC BLOOD PRESSURE: 159 MMHG | DIASTOLIC BLOOD PRESSURE: 85 MMHG | WEIGHT: 272 LBS | RESPIRATION RATE: 16 BRPM | TEMPERATURE: 98.6 F | HEART RATE: 88 BPM

## 2024-09-24 DIAGNOSIS — G89.29 ACUTE EXACERBATION OF CHRONIC LOW BACK PAIN: Primary | ICD-10-CM

## 2024-09-24 DIAGNOSIS — R20.0 SADDLE ANESTHESIA: ICD-10-CM

## 2024-09-24 DIAGNOSIS — M54.50 ACUTE EXACERBATION OF CHRONIC LOW BACK PAIN: Primary | ICD-10-CM

## 2024-09-24 PROCEDURE — 6370000000 HC RX 637 (ALT 250 FOR IP)

## 2024-09-24 PROCEDURE — 72148 MRI LUMBAR SPINE W/O DYE: CPT

## 2024-09-24 PROCEDURE — 99284 EMERGENCY DEPT VISIT MOD MDM: CPT

## 2024-09-24 PROCEDURE — 72131 CT LUMBAR SPINE W/O DYE: CPT

## 2024-09-24 RX ORDER — HYDROXYZINE HYDROCHLORIDE 25 MG/1
50 TABLET, FILM COATED ORAL ONCE
Status: COMPLETED | OUTPATIENT
Start: 2024-09-24 | End: 2024-09-24

## 2024-09-24 RX ORDER — HYDROCODONE BITARTRATE AND ACETAMINOPHEN 5; 325 MG/1; MG/1
1 TABLET ORAL ONCE
Status: COMPLETED | OUTPATIENT
Start: 2024-09-24 | End: 2024-09-24

## 2024-09-24 RX ORDER — LIDOCAINE 4 G/G
1 PATCH TOPICAL DAILY
Status: DISCONTINUED | OUTPATIENT
Start: 2024-09-25 | End: 2024-09-25 | Stop reason: HOSPADM

## 2024-09-24 RX ADMIN — HYDROXYZINE HYDROCHLORIDE 50 MG: 25 TABLET, FILM COATED ORAL at 21:51

## 2024-09-24 RX ADMIN — HYDROCODONE BITARTRATE AND ACETAMINOPHEN 1 TABLET: 5; 325 TABLET ORAL at 21:52

## 2024-09-24 ASSESSMENT — LIFESTYLE VARIABLES
HOW MANY STANDARD DRINKS CONTAINING ALCOHOL DO YOU HAVE ON A TYPICAL DAY: 1 OR 2
HOW OFTEN DO YOU HAVE A DRINK CONTAINING ALCOHOL: MONTHLY OR LESS

## 2024-09-24 ASSESSMENT — PAIN SCALES - GENERAL: PAINLEVEL_OUTOF10: 6

## 2024-09-24 ASSESSMENT — PAIN - FUNCTIONAL ASSESSMENT: PAIN_FUNCTIONAL_ASSESSMENT: 0-10

## 2024-09-24 NOTE — ED NOTES
Pt to ED via triage with c/o lower back pain with incontinence. Reports chronic back pain. Has been incontinent of urine the past 2 nights. Now has tingling in lower extremities that started 2 days ago. Denies chest pain, shortness of breath. Pt is a/ox4, ambulatory, RR even and non labored on room air, attached to full cardiac monitor, call light in reach.

## 2024-09-24 NOTE — ED PROVIDER NOTES
topically as needed for Pain Apply topically as needed.    Provider, MD Heraclio   acetaminophen (TYLENOL) 325 MG tablet Take 2 tablets by mouth as needed for Pain    Provider, MD Heraclio         REVIEW OF SYSTEMS       Review of Systems  See HPI  PHYSICAL EXAM      INITIAL VITALS:   BP (!) 159/85   Pulse 88   Temp 98.6 °F (37 °C) (Oral)   Resp 16   Ht 1.778 m (5' 10\")   Wt 123.4 kg (272 lb)   SpO2 98%   BMI 39.03 kg/m²     Physical Exam  Constitutional:       General: He is not in acute distress.     Appearance: He is obese.   HENT:      Head: Normocephalic and atraumatic.      Mouth/Throat:      Mouth: Mucous membranes are moist.      Pharynx: Oropharynx is clear.   Eyes:      Extraocular Movements: Extraocular movements intact.      Pupils: Pupils are equal, round, and reactive to light.   Cardiovascular:      Rate and Rhythm: Normal rate and regular rhythm.      Pulses: Normal pulses.   Pulmonary:      Effort: Pulmonary effort is normal. No respiratory distress.      Breath sounds: Normal breath sounds.   Abdominal:      Palpations: Abdomen is soft.      Tenderness: There is no abdominal tenderness. There is no guarding or rebound.   Skin:     General: Skin is warm and dry.      Capillary Refill: Capillary refill takes less than 2 seconds.   Neurological:      Mental Status: He is alert.      Comments: NIH of 0, intact strength and sensation in bilateral upper and lower extremities.  Patient reports decreased sensation overlying the groin area.           DDX/DIAGNOSTIC RESULTS / EMERGENCY DEPARTMENT COURSE / MDM     Medical Decision Making  Amount and/or Complexity of Data Reviewed  Radiology: ordered.    Risk  Prescription drug management.    49-year-old male presenting the ED with acute on chronic lower back pain as well as incontinence of urine the past few nights as well as decreased sensation over the groin.  GCS 15, vital signs stable.  Patient seen ambulating with a steady gait here in the  emergency department.  Patient is voiding spontaneously here in the emergency department.  Patient has good rectal tone, decreased sensation overlying the penis, groin, rectum.  Intact and equal strength and sensation in all extremities.  CT lumbar spine ordered as well as MRI lumbar spine with neurosurgery consultation.  Patient signed out to nighttime resident with MRI lumbar spine pending.      FINAL IMPRESSION      1. Acute exacerbation of chronic low back pain    2. Saddle anesthesia          DISPOSITION / PLAN     DISPOSITION    Condition at Disposition: Data Unavailable      PATIENT REFERRED TO:  No follow-up provider specified.    DISCHARGE MEDICATIONS:  New Prescriptions    No medications on file       Alfonzo Fields DO  Emergency Medicine Resident    (Please note that portions of thisnote were completed with a voice recognition program.  Efforts were made to edit the dictations but occasionally words are mis-transcribed.)

## 2024-09-24 NOTE — ED PROVIDER NOTES
[facility]     Emergency Department     Faculty Attestation    I performed a history and physical examination of the patient and discussed management with the resident. I have reviewed and agree with the resident’s findings including all diagnostic interpretations, and treatment plans as written at the time of my review. Any areas of disagreement are noted on the chart. I was personally present for the key portions of any procedures. I have documented in the chart those procedures where I was not present during the key portions. For Physician Assistant/ Nurse Practitioner cases/documentation I have personally evaluated this patient and have completed at least one if not all key elements of the E/M (history, physical exam, and MDM). Additional findings are as noted.    PtName: Darrel Sanches  MRN: [id number]  Birthdate 1975  Date of evaluation: 9/24/24  Note Started: 7:02 PM EDT    Primary Care Physician: Uri Osborn MD    Brief HPI:  49-year-old male presents emergency department with lower back pain and urinary incontinence.  Symptoms over the past few days progressively worsening.    Pertinent Physical Exam Findings:  Vitals:    09/24/24 1817   BP: (!) 159/85   Pulse: 88   Resp: 16   Temp: 98.6 °F (37 °C)   SpO2: 98%   See resident note for details, patient was noted to have decreased sensation in the perineal region.    Medical Decision Making: Patient is a 49 y.o. male presenting to the emergency department with back pain and urinary incontinence. The chart was reviewed for pertinent history relating to the chief complaint.  The patient appears well, no acute distress.  The patient has a red flag symptom of urinary incontinence with associated back pain.  MRI will be obtained to rule out spinal cord compression and cauda equina.     All results, including labs (if ordered), imaging (if ordered), and EKGs (if ordered) were independently interpreted by  me.  See radiologist report for additional details on imaging studies.      (Please note that portions of this note were completed with a voice recognition program.  Efforts were made to edit the dictations but occasionally words are mis-transcribed.)    Jimbo Arce DO   Attending Emergency Medicine Physician         Jimbo Arce DO  09/25/24 0011

## 2024-09-25 PROCEDURE — 6370000000 HC RX 637 (ALT 250 FOR IP)

## 2024-09-25 RX ORDER — HYDROCODONE BITARTRATE AND ACETAMINOPHEN 5; 325 MG/1; MG/1
1 TABLET ORAL ONCE
Status: COMPLETED | OUTPATIENT
Start: 2024-09-25 | End: 2024-09-25

## 2024-09-25 RX ADMIN — HYDROCODONE BITARTRATE AND ACETAMINOPHEN 1 TABLET: 5; 325 TABLET ORAL at 02:28

## 2024-09-25 NOTE — CONSULTS
[facility] [place]    Department of Neurosurgery  Resident Consult Note      Reason for Consult:  worsening back pain, urinary incontinence   Requesting Physician:  Alfonzo Fields DO    Neurosurgeon:   []Dr. Gonzalez  []Dr. Hines  [x]Dr. Mora  []Dr. Devi  []Dr. Ardon  []Dr. Montaño  []Dr. Collins    Neurosurgery notified of consult at:  Neurosurgery evaluation begun: 9:49 PM        History Obtained From:  patient, electronic medical record    CHIEF COMPLAINT:         Back pain     HISTORY OF PRESENT ILLNESS:       The patient is a 49 y.o. male with pmhx of lumbosacral spondylosis, displacement of lumbar intervertebral disc and chronic back pain, who presents with worsening back pain from baseline of 1 week duration. Patient got pulled by his dog prior to exacerbating his back pain, this seem to be accompanied by tingling of his genitals, had urinary incontinence 2 times since then. Does have left hip pain. All symptoms gets worse with movement. Patient follows with pain management of ablation.     Patient had a CT lumbar showing multilevel degenerative disc disease which is moderate at L5-S1 and mild elsewhere and multilevel facet arthropathy in the spine. Disc bulging and probable small  protrusions at the L1-L2 and L2-L3 levels result in mild central canal  narrowing. Disc bulging and facet arthropathy result in severe left and moderate right foraminal narrowing at L5-S1 and mild bilateral narrowing at L4-L5    An MRI L spine was ordered.     Patient pain seems to be improving with symptomatic treatment     PAST MEDICAL HISTORY :       Past Medical History:        Diagnosis Date    Back pain, chronic     Bipolar 2 disorder (HCC)     Bronchitis     DDD (degenerative disc disease), lumbar     Depression     Displacement of lumbar intervertebral disc without myelopathy 5/7/2013    Fibula fracture LEFT    History of gastric ulcer     History of suicidal tendencies     Hyperlipidemia      shortness of breath  Cardiovascular ROS - No chest pain  Gastrointestinal ROS - No abdominal pain, no nausea, no vomiting  Genito-Urinary ROS - No dysuria  Musculoskeletal ROS - No neck pain, no back pain  Neurological ROS - No focal weakness or loss of sensation, no headache  Dermatological ROS - No lesions, no rash  Vascular/Lymphatic ROS - No edema    PHYSICAL EXAM:       Vitals:    09/24/24 1817   BP: (!) 159/85   Pulse: 88   Resp: 16   Temp: 98.6 °F (37 °C)   SpO2: 98%       CONSTITUTIONAL:  Well developed, well nourished, alert and oriented x 3, in no acute distress, nontoxic. No dysarthria, no aphasia. EOMI.    HEAD:  normocephalic, atraumatic    EYES:  PERRLA, EOMI   ENT:  moist mucous membranes, no stridor, no tracheal deviation   NECK:  no midline tenderness to palpation, no step-offs or deformities   BACK:  no midline tenderness to palpation, no step-offs or deformities    LUNGS:  CTAB, equal air entry bilaterally, no wheezes or rales   CARDIOVASCULAR:  RRR, no murmur   ABDOMEN:  Soft, no rigidity   NEUROLOGIC:  EYE OPENING     Spontaneous - 4 [x]       To voice - 3 []       To pain - 2 []       None - 1 []    VERBAL RESPONSE     Appropriate, oriented - 5 [x]       Dazed or confused - 4 []       Syllables, expletives - 3 []       Grunts - 2 []       None - 1 []    MOTOR RESPONSE     Spontaneous, command - 6 [x]       Localizes pain - 5 []       Withdraws pain - 4 []       Abnormal flexion - 3 []       Abnormal extension - 2 []       None - 1 []            Total GCS: 15    Mental Status:  A & O x3, awake             Cranial Nerves:    cranial nerves II-XII are grossly intact    Motor Exam:    Drift:  absent  Tone:  normal    Motor exam is symmetrical 5 out of 5 upper extremities bilaterally  LLE 4/5 hip flexion limited due to pain, 5/5 knee flexion and extension  RLE 5/5     Sensory:    Touch:    Right Upper Extremity:  normal  Left Upper Extremity:  normal  Right Lower Extremity:  normal  Left Lower

## 2024-09-25 NOTE — ED PROVIDER NOTES
[facility]   Emergency Department  Emergency Medicine Attending Sign-out   Note started: 12:14 AM EDT    Care of Darrel Sanches was assumed from previous attending Dr. Arce at 12 AM and is being seen for Back Pain (Lumbar ) and Incontinence  .  The patient's initial evaluation and plan have been discussed with the prior provider who initially evaluated the patient.     Attestation  I was available and discussed any additional care issues that arose and coordinated the management plans with the resident(s) caring for the patient during my duty period. Any areas of disagreement with resident's documentation of care or procedures are noted on the chart. I was personally present for the key portions of any/all procedures, during my duty period. I have documented in the chart those procedures where I was not present during the key portions.     BRIEF PATIENT SUMMARY/MDM COURSE PER INITIAL PROVIDER:   RECENT VITALS:     Temp: 98.6 °F (37 °C),  Pulse: 88, Respirations: 16, BP: (!) 159/85, SpO2: 98 %    This patient is a 49 y.o. Male with lower back pain, and reported urinary incontinence, awaiting MRI imaging    DIAGNOSTICS/MEDICATIONS:     MEDICATIONS GIVEN:  ED Medication Orders (From admission, onward)      Start Ordered     Status Ordering Provider    09/25/24 0900 09/24/24 2134  lidocaine 4 % external patch 1 patch  DAILY         Acknowledged KAREEM DELAROSA    09/24/24 2145 09/24/24 2134  HYDROcodone-acetaminophen (NORCO) 5-325 MG per tablet 1 tablet  ONCE         Last MAR action: Given - by DONNY SALAMANCA on 09/24/24 at 2152 KAREEM DELAROSA    09/24/24 2045 09/24/24 2031  hydrOXYzine HCl (ATARAX) tablet 50 mg  ONCE         Last MAR action: Given - by DONNY SALAMANCA on 09/24/24 at 2151 EDA SHIN            LABS    Labs Reviewed - No data to display    RADIOLOGY  MRI LUMBAR SPINE WO CONTRAST    Result Date: 9/25/2024  1. Hyperlordosis of lumbosacral spine. 2. Moderate  multilevel degenerative disc disease in the lumbar spine. 3. At L5-S1, there is a left paracentral broad-based disc protrusion or herniation with prominent buttressing osteophytes from endplates resulting in moderate stenosis of left lateral recess and mild mass effect to the ventral aspect of descending left S1 nerve root.  Moderate to severe stenosis of left neural foramen. 4. At L4-5, there is moderate stenosis of right lateral recess. 5. At L3-4, there is mild stenosis of right lateral recess. 6. At L2-3, there is moderate stenosis of left lateral recess. 7. At L5-S1, there is moderate stenosis of left lateral recess.     CT LUMBAR SPINE WO CONTRAST    Result Date: 9/24/2024  EXAMINATION: CT OF THE LUMBAR SPINE WITHOUT CONTRAST  9/24/2024 TECHNIQUE: CT of the lumbar spine was performed without the administration of intravenous contrast. Multiplanar reformatted images are provided for review. Adjustment of mA and/or kV according to patient size was utilized.  Automated exposure control, iterative reconstruction, and/or weight based adjustment of the mA/kV was utilized to reduce the radiation dose to as low as reasonably achievable. COMPARISON: MRI 07/01/2023. HISTORY: ORDERING SYSTEM PROVIDED HISTORY: lumbar pain, incontinece TECHNOLOGIST PROVIDED HISTORY: lumbar pain, incontinece Decision Support Exception - unselect if not a suspected or confirmed emergency medical condition->Emergency Medical Condition (MA) FINDINGS: BONES/ALIGNMENT: There are 5 lumbar vertebrae.  There is normal spinal alignment.  Vertebral body heights appear normal.  There is no fracture or destructive bone lesion. DEGENERATIVE CHANGES: Multilevel degenerative disc disease is moderate at L5-S1 and mild at the other levels.  There is multilevel facet arthropathy. L1-L2: There is disc bulging and a probable small posterior protrusion which causes mild central canal narrowing. L2-L3: Disc bulging and ligamentum flavum hypertrophy combine with

## 2024-09-25 NOTE — DISCHARGE INSTRUCTIONS
You have been evaluated in the Emergency Department at [facility] 9/25/2024     Your recommendations and if necessary prescriptions from today's visit:   -Take medication as prescribed  -Follow-up with your PCP  -Follow-up with neuorsurgery    If you do not have a primary care provider, establish care with a provider that you can begin to regularly follow-up with.    Should you have any questions regarding your care or further treatment, please call [facility] Emergency Department at [phone].    PLEASE RETURN TO THE EMERGENCY DEPARTMENT IMMEDIATELY for   -NEW or change from your baseline numbness, weakness or tingling in the arms or legs  -NEW or change from your baseline inability to move your muscles, weakness or tingling  -Changes in mental status or loss of consciousness  -Loss of bowel or bladder control  -Inability to urinate for greater than 12 hours, burning when you urinate, increase in the number of times you have to go to the bathroom to urinate or if you have an urgency to urinate  -Change in color of extremities    OR    -Changing symptoms  -Worsening symptoms  -Unable to follow up with your primary care provider  -Any other care or concern

## 2024-09-25 NOTE — ED PROVIDER NOTES
[facility] ED  Emergency Department  Emergency Medicine Resident Turn-Over     Note Started: 8:31 PM EDT    Care of Darrel Sanches was assumed from Dr. Milian and is being seen for Back Pain (Lumbar ) and Incontinence  .  The patient's initial evaluation and plan have been discussed with the prior provider who initially evaluated the patient.     EMERGENCY DEPARTMENT COURSE / MEDICAL DECISION MAKING:       MEDICATIONS GIVEN:  Orders Placed This Encounter   Medications    hydrOXYzine HCl (ATARAX) tablet 50 mg    HYDROcodone-acetaminophen (NORCO) 5-325 MG per tablet 1 tablet    lidocaine 4 % external patch 1 patch    HYDROcodone-acetaminophen (NORCO) 5-325 MG per tablet 1 tablet       LABS / RADIOLOGY:     Labs Reviewed - No data to display    No results found.    RECENT VITALS:     Temp: 98.6 °F (37 °C),  Pulse: 88, Respirations: 16, BP: (!) 159/85, SpO2: 98 %    This patient is a 49 y.o. Male with presenting to ED via for    CC's: Chronic low back pain    Incontinent of urine, Bi anal/rectal/saddle anesthesia, voiding spontaneously in ED department.  Good rectal tone    R/o rule out cauda equina for patient's acute on chronic back pain    -CT lumbar spine, MRI lumbar spine  -No history of red flag syndromes/cancer/IV drug usage  -Neurosurgery consulted  -Patient takes tramadol at home, gabapentin, Zanaflex  -Not requiring any additional pain control    Notable PMHx: Spondylolysis, osteoarthritis, history of suicidal tendency, bipolar disorder, chronic back pain      OUTSTANDING TASKS / RECOMMENDATIONS:    Follow-up CT, MRI  Follow-up neurosurgery recommendations  DISPO?     FINAL IMPRESSION:     1. Acute exacerbation of chronic low back pain    2. Saddle anesthesia        DISPOSITION:         DISPOSITION:  [x]  Discharge   []  Transfer -    []  Admission -     []  Against Medical Advice   []  Eloped   FOLLOW-UP: [facility] ED  [facility]

## 2024-11-05 ENCOUNTER — OFFICE VISIT (OUTPATIENT)
Dept: PAIN MANAGEMENT | Age: 49
End: 2024-11-05
Payer: COMMERCIAL

## 2024-11-05 VITALS — WEIGHT: 272 LBS | BODY MASS INDEX: 38.94 KG/M2 | HEIGHT: 70 IN

## 2024-11-05 DIAGNOSIS — G89.29 CHRONIC BILATERAL LOW BACK PAIN WITHOUT SCIATICA: ICD-10-CM

## 2024-11-05 DIAGNOSIS — M47.817 LUMBOSACRAL SPONDYLOSIS WITHOUT MYELOPATHY: Chronic | ICD-10-CM

## 2024-11-05 DIAGNOSIS — M54.50 CHRONIC BILATERAL LOW BACK PAIN WITHOUT SCIATICA: ICD-10-CM

## 2024-11-05 PROCEDURE — 99214 OFFICE O/P EST MOD 30 MIN: CPT | Performed by: ANESTHESIOLOGY

## 2024-11-05 RX ORDER — GABAPENTIN 300 MG/1
300 CAPSULE ORAL EVERY 8 HOURS SCHEDULED
Qty: 270 CAPSULE | Refills: 0 | Status: SHIPPED | OUTPATIENT
Start: 2024-11-05 | End: 2025-02-03

## 2024-11-05 RX ORDER — TRAMADOL HYDROCHLORIDE 50 MG/1
50 TABLET ORAL EVERY 8 HOURS PRN
Qty: 90 TABLET | Refills: 1 | Status: SHIPPED | OUTPATIENT
Start: 2024-11-05 | End: 2025-01-04

## 2024-11-05 NOTE — PROGRESS NOTES
The patient is a 49 y.o.Non- / non  male.    Chief Complaint   Patient presents with    Back Pain     Med refill        HPI      Chief Complaint: back pain  Medication Refill: Tramadol    Pain score Today:  2  Adverse effects (Constipation / Nausea / Sedation / sexual Dysfunction / others) : no  Mood: fair  Sleep pattern and quality: poor  Activity level: good    Pill count Today:13  Last dose taken  November 05, 2024  OARRS report reviewed today: yes  ER/Hospitalizations/PCP visit related to pain since last visit:yes   Any legal problems e.g. DUI etc.:  Satisfied with current management: Yes    Opioid Contract:01/10/24  Last Urine Dug screen dated:01/05/24        Past Medical History:   Diagnosis Date    Back pain, chronic     Bipolar 2 disorder (HCC)     Bronchitis     DDD (degenerative disc disease), lumbar     Depression     Displacement of lumbar intervertebral disc without myelopathy 5/7/2013    Fibula fracture LEFT    History of gastric ulcer     History of suicidal tendencies     Hyperlipidemia     Lumbar pain with radiation down both legs     Lumbar stenosis     Non-union of fracture LEFT ANKLE    Osteoarthritis     Pneumonia     Sleep disturbance     Spider bite     on great toe on left    Spondylosis     Tenosynovitis of ankle LEFT        Past Surgical History:   Procedure Laterality Date    ADENOIDECTOMY      HC INJECT OTHER PERPHRL NERV Bilateral 5/9/2019    INJECTION FACET LUMBAR L4-S1 performed by Felipe Ferreira MD at [facility]    NERVE BLOCK  8/8/13    Duramorph, Celestone 9mg    NERVE BLOCK  4-24-14    duramorph epidural steroid block, celestone 9mg, morphine 1.25mg    NERVE BLOCK  12/19/2014    DURAMORPH - CELESTONE 9 MG- DURAMORPH  1.5MG    NERVE BLOCK  7/23/15    duramorph 1.5mg celestone 9mg    NERVE BLOCK  11/19/15    Tens-Empi Select    NERVE BLOCK  3-10-16    duramorph epidural steroid block duramorph 1.5 mg decadron 7.5 mg    NERVE BLOCK  12/12/2016    duramorph

## 2025-01-07 ENCOUNTER — OFFICE VISIT (OUTPATIENT)
Dept: PAIN MANAGEMENT | Age: 50
End: 2025-01-07
Payer: COMMERCIAL

## 2025-01-07 VITALS — BODY MASS INDEX: 38.94 KG/M2 | WEIGHT: 272 LBS | HEIGHT: 70 IN

## 2025-01-07 DIAGNOSIS — M47.817 LUMBOSACRAL SPONDYLOSIS WITHOUT MYELOPATHY: Chronic | ICD-10-CM

## 2025-01-07 DIAGNOSIS — M54.50 CHRONIC BILATERAL LOW BACK PAIN WITHOUT SCIATICA: ICD-10-CM

## 2025-01-07 DIAGNOSIS — G89.29 CHRONIC BILATERAL LOW BACK PAIN WITHOUT SCIATICA: ICD-10-CM

## 2025-01-07 DIAGNOSIS — Z79.891 CHRONIC USE OF OPIATE DRUG FOR THERAPEUTIC PURPOSE: Primary | ICD-10-CM

## 2025-01-07 PROCEDURE — 99214 OFFICE O/P EST MOD 30 MIN: CPT | Performed by: ANESTHESIOLOGY

## 2025-01-07 RX ORDER — GABAPENTIN 300 MG/1
300 CAPSULE ORAL EVERY 8 HOURS SCHEDULED
Qty: 270 CAPSULE | Refills: 0 | Status: SHIPPED | OUTPATIENT
Start: 2025-01-07 | End: 2025-04-07

## 2025-01-07 RX ORDER — TRAMADOL HYDROCHLORIDE 50 MG/1
50 TABLET ORAL EVERY 8 HOURS PRN
Qty: 90 TABLET | Refills: 1 | Status: SHIPPED | OUTPATIENT
Start: 2025-01-07 | End: 2025-03-08

## 2025-01-07 ASSESSMENT — ENCOUNTER SYMPTOMS
BACK PAIN: 1
SHORTNESS OF BREATH: 0
SORE THROAT: 0
EYES NEGATIVE: 1
ALLERGIC/IMMUNOLOGIC NEGATIVE: 1
DIARRHEA: 0

## 2025-01-07 NOTE — H&P (VIEW-ONLY)
The patient is a 49 y.o.Non- / non  male.    Chief Complaint   Patient presents with    Back Pain    Medication Refill      Back pain  Chronic going on for 15 years  Predominantly axial located in the lower lumbar area across midline on both side  Have done multiple courses of physical therapy  Currently on multimodal medication regimen with tramadol tizanidine and gabapentin as well as using TENS unit  Reports no side effect from the medication  Last diagnostic workup with MRI in July 2023 showed multilevel lower lumbar facet arthropathy  Patient had bilateral lumbar radiofrequency ablation with fluoroscopy guidance in May 2022 at L4-5 and L5-S1 facet that provided more than 80% relief for more than 1 year and then gradual return of the symptom back to the baseline    He is interested in repeating lumbar radiofrequency ablation  Clinical presentation suggest recurrence of facet mediated pain  Will schedule for it without sedation  Patient will have a cab for the driveway back home    Pain score Today:  6  Adverse effects (Constipation / Nausea / Sedation / sexual Dysfunction / others) : None  Mood: good  Sleep pattern and quality: poor  Activity level: fair    Pill count Today:10  Last dose taken  1/7/2025   OARRS report reviewed today: yes  ER/Hospitalizations/PCP visit related to pain since last visit:no   Any legal problems e.g. DUI etc.:No  Satisfied with current management: Yes    Opioid Contract:1/10/2024  Last Urine Dug screen dated:1/5/2024    No results found for: \"LABA1C\"    Past Medical History, Past Surgical History, Social History, Allergies and Medications reviewed and updated in EPIC as indicated    Family History reviewed and is noncontributory.          Past Medical History:   Diagnosis Date    Back pain, chronic     Bipolar 2 disorder (HCC)     Bronchitis     DDD (degenerative disc disease), lumbar     Depression     Displacement of lumbar intervertebral disc without myelopathy

## 2025-01-07 NOTE — PROGRESS NOTES
normal coordination.    Pupils:  Pupils are equal, round, and reactive to light.  Pupils are equal.   Skin:  Warm and dry.  No rash or cyanosis.   Lumbar spine examination  Range of motion limited  Tenderness to palpation in lumbar area  Facet loading positive  Gait is stable    Assessment & Plan  1. Chronic use of opiate drug for therapeutic purpose    2. Chronic bilateral low back pain without sciatica    3. Lumbosacral spondylosis without myelopathy        Orders Placed This Encounter   Procedures    FACET JOINT L/S SINGLE      Orders Placed This Encounter   Medications    traMADol (ULTRAM) 50 MG tablet     Sig: Take 1 tablet by mouth every 8 hours as needed for Pain for up to 60 days. Max Daily Amount: 150 mg     Dispense:  90 tablet     Refill:  1     Reduce doses taken as pain becomes manageable    gabapentin (NEURONTIN) 300 MG capsule     Sig: Take 1 capsule by mouth every 8 hours for 90 days.     Dispense:  270 capsule     Refill:  0    tiZANidine (ZANAFLEX) 4 MG tablet     Sig: Take 1 tablet by mouth in the morning and at bedtime     Dispense:  60 tablet     Refill:  1      Controlled Substance Monitoring:    Acute and Chronic Pain Monitoring:   RX Monitoring Periodic Controlled Substance Monitoring   9/9/2024   9:04 AM Possible medication side effects, risk of tolerance/dependence & alternative treatments discussed.;No signs of potential drug abuse or diversion identified.;Assessed functional status (ability to engage in work or other purposeful activities, the pain intensity and its interference with activities of daily living, quality of family life and social activities, and the physical activity);Obtaining appropriate analgesic effect of treatment.               Electronically signed by Abdelrahman Chakraborty MD on 1/7/2025 at 9:29 AM

## 2025-02-05 ENCOUNTER — HOSPITAL ENCOUNTER (OUTPATIENT)
Dept: PAIN MANAGEMENT | Facility: CLINIC | Age: 50
Discharge: HOME | End: 2025-02-05
Payer: COMMERCIAL

## 2025-02-05 VITALS
RESPIRATION RATE: 14 BRPM | OXYGEN SATURATION: 97 % | HEART RATE: 97 BPM | WEIGHT: 272 LBS | SYSTOLIC BLOOD PRESSURE: 136 MMHG | HEIGHT: 70 IN | BODY MASS INDEX: 38.94 KG/M2 | TEMPERATURE: 97.1 F | DIASTOLIC BLOOD PRESSURE: 77 MMHG

## 2025-02-05 DIAGNOSIS — M47.817 LUMBOSACRAL SPONDYLOSIS WITHOUT MYELOPATHY: Primary | Chronic | ICD-10-CM

## 2025-02-05 DIAGNOSIS — R52 PAIN MANAGEMENT: ICD-10-CM

## 2025-02-05 PROCEDURE — 64635 DESTROY LUMB/SAC FACET JNT: CPT | Performed by: ANESTHESIOLOGY

## 2025-02-05 PROCEDURE — 64636 DESTROY L/S FACET JNT ADDL: CPT

## 2025-02-05 PROCEDURE — 64635 DESTROY LUMB/SAC FACET JNT: CPT

## 2025-02-05 PROCEDURE — 64636 DESTROY L/S FACET JNT ADDL: CPT | Performed by: ANESTHESIOLOGY

## 2025-02-05 PROCEDURE — 6360000002 HC RX W HCPCS: Performed by: ANESTHESIOLOGY

## 2025-02-05 RX ORDER — LIDOCAINE HYDROCHLORIDE 10 MG/ML
INJECTION, SOLUTION EPIDURAL; INFILTRATION; INTRACAUDAL; PERINEURAL
Status: COMPLETED | OUTPATIENT
Start: 2025-02-05 | End: 2025-02-05

## 2025-02-05 RX ORDER — LIDOCAINE HYDROCHLORIDE 40 MG/ML
INJECTION, SOLUTION RETROBULBAR
Status: COMPLETED | OUTPATIENT
Start: 2025-02-05 | End: 2025-02-05

## 2025-02-05 RX ADMIN — LIDOCAINE HYDROCHLORIDE 5 ML: 40 SOLUTION RETROBULBAR; TOPICAL at 08:08

## 2025-02-05 RX ADMIN — LIDOCAINE HYDROCHLORIDE 8 ML: 10 INJECTION, SOLUTION EPIDURAL; INFILTRATION; INTRACAUDAL at 08:05

## 2025-02-05 ASSESSMENT — PAIN DESCRIPTION - ORIENTATION: ORIENTATION: LOWER

## 2025-02-05 ASSESSMENT — PAIN DESCRIPTION - LOCATION: LOCATION: BACK

## 2025-02-05 ASSESSMENT — PAIN DESCRIPTION - FREQUENCY: FREQUENCY: CONTINUOUS

## 2025-02-05 ASSESSMENT — PAIN DESCRIPTION - PAIN TYPE: TYPE: CHRONIC PAIN

## 2025-02-05 ASSESSMENT — PAIN SCALES - GENERAL
PAINLEVEL_OUTOF10: 0
PAINLEVEL_OUTOF10: 4

## 2025-02-05 ASSESSMENT — PAIN DESCRIPTION - DIRECTION: RADIATING_TOWARDS: BILAT HIPS AND LEGS

## 2025-02-05 ASSESSMENT — PAIN - FUNCTIONAL ASSESSMENT: PAIN_FUNCTIONAL_ASSESSMENT: PREVENTS OR INTERFERES WITH ALL ACTIVE AND SOME PASSIVE ACTIVITIES

## 2025-02-05 ASSESSMENT — PAIN DESCRIPTION - DESCRIPTORS: DESCRIPTORS: SHARP;SHOOTING

## 2025-02-05 ASSESSMENT — PAIN DESCRIPTION - ONSET: ONSET: AWAKENED FROM SLEEP

## 2025-02-05 NOTE — INTERVAL H&P NOTE
Update History & Physical    The patient's History and Physical of January 7, 2025 was reviewed with the patient and I examined the patient. There was no change. The surgical site was confirmed by the patient and me.     Plan: The risks, benefits, expected outcome, and alternative to the recommended procedure have been discussed with the patient. Patient understands and wants to proceed with the procedure.       ASA 3  MP 3    Electronically signed by Abdelrahman Chakraborty MD on 2/5/2025 at 7:59 AM

## 2025-02-05 NOTE — DISCHARGE INSTRUCTIONS
Discharge Instructions following Sedation or Anesthesia:  You have  received  a sedative/anesthetic therefore, you should not consume any alcoholic beverages for minimum of 12 hours.  Do not drive or operate machinery for 24 hours.  Do not sign legal documents for 24 hours.  Dizziness, drowsiness, and unsteadiness may occur.  Rest when need to.  Increase diet as tolerated.  Keep up on fluids if diet allows.      General Instructions:  Do not take a tub bath for 72 hours after procedure (this includes hot tubs and swimming pools).  You may shower, but avoid hot water to injection site.   Avoid strenuous activity TODAY especially if you experience dizziness.   Remove band-aid the next day.  Wash off any residual iodine   Do not use heat, heating pad, or any other heating device over the injection site for 3 days after the procedure.  If you experience pain after your procedure, you may continue with your current pain medication as prescribed.  (DO NOT INCREASE YOUR PAIN MEDICATION WITHOUT TALKING TO DOCTOR)  Soreness and pain at injection site is common, may use ice to reduce soreness.    Call [facility] at [phone] if you experience:   Fever, chills or temperature over 100    Vomiting, Headache, persistent stiff neck, nausea, blurred vision   Difficulty in urinating or unable to urinate with 8 hours   Increase in weakness, numbness or loss of function   Increased redness, swelling or drainage at the injection site

## 2025-03-04 ENCOUNTER — OFFICE VISIT (OUTPATIENT)
Dept: PAIN MANAGEMENT | Age: 50
End: 2025-03-04
Payer: COMMERCIAL

## 2025-03-04 VITALS — BODY MASS INDEX: 38.94 KG/M2 | HEIGHT: 70 IN | WEIGHT: 272 LBS

## 2025-03-04 DIAGNOSIS — G89.29 CHRONIC BILATERAL LOW BACK PAIN WITHOUT SCIATICA: ICD-10-CM

## 2025-03-04 DIAGNOSIS — M47.817 LUMBOSACRAL SPONDYLOSIS WITHOUT MYELOPATHY: Chronic | ICD-10-CM

## 2025-03-04 DIAGNOSIS — M54.50 CHRONIC BILATERAL LOW BACK PAIN WITHOUT SCIATICA: ICD-10-CM

## 2025-03-04 PROCEDURE — 99213 OFFICE O/P EST LOW 20 MIN: CPT | Performed by: ANESTHESIOLOGY

## 2025-03-04 RX ORDER — GABAPENTIN 300 MG/1
300 CAPSULE ORAL EVERY 8 HOURS SCHEDULED
Qty: 270 CAPSULE | Refills: 0 | Status: SHIPPED | OUTPATIENT
Start: 2025-03-04 | End: 2025-06-02

## 2025-03-04 RX ORDER — TRAMADOL HYDROCHLORIDE 50 MG/1
50 TABLET ORAL EVERY 6 HOURS PRN
Qty: 120 TABLET | Refills: 0 | Status: SHIPPED | OUTPATIENT
Start: 2025-03-04 | End: 2025-04-03

## 2025-03-04 ASSESSMENT — ENCOUNTER SYMPTOMS
CHEST TIGHTNESS: 1
EYES NEGATIVE: 1
BACK PAIN: 1
COUGH: 1

## 2025-03-04 NOTE — PROGRESS NOTES
Encounter   Medications    gabapentin (NEURONTIN) 300 MG capsule     Sig: Take 1 capsule by mouth every 8 hours for 90 days.     Dispense:  270 capsule     Refill:  0    tiZANidine (ZANAFLEX) 4 MG tablet     Sig: Take 1 tablet by mouth in the morning and at bedtime     Dispense:  60 tablet     Refill:  1    traMADol (ULTRAM) 50 MG tablet     Sig: Take 1 tablet by mouth every 6 hours as needed for Pain for up to 30 days. Max Daily Amount: 200 mg     Dispense:  120 tablet     Refill:  0     Reduce doses taken as pain becomes manageable      Controlled Substance Monitoring:    Acute and Chronic Pain Monitoring:   RX Monitoring Periodic Controlled Substance Monitoring Chronic Pain > 50 MEDD   3/4/2025   1:58 PM Possible medication side effects, risk of tolerance/dependence & alternative treatments discussed.;No signs of potential drug abuse or diversion identified.;Obtaining appropriate analgesic effect of treatment. Obtained or confirmed \"Consent for Opioid Use\" on file.               Electronically signed by Abdelrahman Chakraborty MD on 3/4/2025 at 1:58 PM

## 2025-04-03 ENCOUNTER — OFFICE VISIT (OUTPATIENT)
Dept: PAIN MANAGEMENT | Age: 50
End: 2025-04-03
Payer: COMMERCIAL

## 2025-04-03 VITALS — HEIGHT: 70 IN | WEIGHT: 272 LBS | BODY MASS INDEX: 38.94 KG/M2

## 2025-04-03 DIAGNOSIS — Z86.59 HISTORY OF PSYCHIATRIC DISORDER: ICD-10-CM

## 2025-04-03 DIAGNOSIS — M54.50 CHRONIC BILATERAL LOW BACK PAIN WITHOUT SCIATICA: ICD-10-CM

## 2025-04-03 DIAGNOSIS — M47.817 LUMBOSACRAL SPONDYLOSIS WITHOUT MYELOPATHY: Chronic | ICD-10-CM

## 2025-04-03 DIAGNOSIS — M48.061 SPINAL STENOSIS OF LUMBAR REGION WITHOUT NEUROGENIC CLAUDICATION: ICD-10-CM

## 2025-04-03 DIAGNOSIS — Z79.891 CHRONIC USE OF OPIATE DRUG FOR THERAPEUTIC PURPOSE: Primary | ICD-10-CM

## 2025-04-03 DIAGNOSIS — G89.29 CHRONIC BILATERAL LOW BACK PAIN WITHOUT SCIATICA: ICD-10-CM

## 2025-04-03 PROCEDURE — 99214 OFFICE O/P EST MOD 30 MIN: CPT | Performed by: NURSE PRACTITIONER

## 2025-04-03 RX ORDER — TRAMADOL HYDROCHLORIDE 50 MG/1
50 TABLET ORAL EVERY 8 HOURS PRN
Qty: 90 TABLET | Refills: 0 | Status: SHIPPED | OUTPATIENT
Start: 2025-04-10 | End: 2025-05-10

## 2025-04-03 ASSESSMENT — ENCOUNTER SYMPTOMS
SHORTNESS OF BREATH: 0
BOWEL INCONTINENCE: 0
BACK PAIN: 1

## 2025-04-03 NOTE — PROGRESS NOTES
Chief Complaint   Patient presents with    Back Pain    Medication Refill     Tramadol  due 4/3/25     PMH       Patient is 49-year-old male with complains of low back pain since 4812-1214 with no known injury.   He has has lumbar steroid injections in past but steroids were discontinued because of psychiatric issue.    He is also using TENS unit and OTC lidocaine cream with good relief.   Patient states tramadol and RFA are managing his pain and helping him stay active. Feels well overall.   MRI completed 7/23 Moderate bilateral neural foraminal narrowing at L5-S1, left greater than right, worsened from the previous exam. Mild bilateral neural foraminal narrowing at L4-5, unchanged from the  previous exam. Additional mild multilevel degenerative changes, similar to the previous exam. MRI updated in 9/2024.     Recent lumbar RFA on 2/5/2025 with continued relief. Average back pain score went from 8-3/10 reports improved activity tolerance on multimodal medication regimen with gabapentin, tizanidine and tramadol. He denies side effects from medications.     Back Pain  This is a chronic problem. The current episode started more than 1 year ago. The problem occurs constantly. The problem is unchanged. The pain is present in the lumbar spine. The quality of the pain is described as aching, burning, cramping, shooting and stabbing. The pain does not radiate. The pain is at a severity of 2/10. The pain is mild. The pain is Worse during the night. The symptoms are aggravated by bending, position, standing and sitting. Stiffness is present All day. Pertinent negatives include no bladder incontinence, bowel incontinence, chest pain, fever or tingling. He has tried muscle relaxant, home exercises and analgesics for the symptoms.     Patient denies any new neurological symptoms. No bowel or bladder incontinence, no weakness, and no falling.    Pill count: 31 pills with patient - appropriate, due for refill 4/3/25 - will

## 2025-04-04 ENCOUNTER — HOSPITAL ENCOUNTER (OUTPATIENT)
Age: 50
Setting detail: SPECIMEN
Discharge: HOME | End: 2025-04-04

## 2025-04-04 DIAGNOSIS — Z79.891 CHRONIC USE OF OPIATE DRUG FOR THERAPEUTIC PURPOSE: ICD-10-CM

## 2025-04-05 LAB
6-ACETYLMORPHINE, UR: NORMAL
7-AMINOCLONAZEPAM, URINE: NORMAL
ALPHA-OH-ALPRAZ, URINE: NORMAL
ALPHA-OH-MIDAZOLAM, URINE: NORMAL
ALPRAZOLAM, URINE: NORMAL
AMPHETAMINE, URINE: NORMAL
BARBITURATES, URINE: NORMAL
BENZOYLECGONINE, UR: NORMAL
BUPRENORPHINE URINE: NORMAL
CARISOPRODOL, UR: NORMAL
CLONAZEPAM, URINE: NORMAL
CODEINE, URINE: NORMAL
CREAT UR-MCNC: NORMAL MG/DL
DIAZEPAM, URINE: NORMAL
DRUGS EXPECTED, UR: NORMAL
EER HI RES INTERP UR: NORMAL
ETHYL GLUCURONIDE UR: NORMAL
FENTANYL URINE: NORMAL
GABAPENTIN: NORMAL
HYDROCODONE, URINE: NORMAL
HYDROMORPHONE, URINE: NORMAL
LORAZEPAM, URINE: NORMAL
MARIJUANA METAB, UR: NORMAL
MDA, URINE: NORMAL
MDEA, EVE, UR: NORMAL
MDMA, URINE: NORMAL
MEPERIDINE METAB, UR: NORMAL
METHADONE, URINE: NORMAL
METHAMPHETAMINE, URINE: NORMAL
METHYLPHENIDATE: NORMAL
MIDAZOLAM, URINE: NORMAL
MORPHINE, OPI1M: NORMAL
NALOXONE URINE: NORMAL
NORBUPRENORPHINE, URINE: NORMAL
NORDIAZEPAM, URINE: NORMAL
NORFENTANYL, URINE: NORMAL
NORHYDROCODONE, URINE: NORMAL
NOROXYCODONE, URINE: NORMAL
NOROXYMORPHONE, URINE: NORMAL
OXAZEPAM, URINE: NORMAL
OXYCODONE URINE: NORMAL
OXYMORPHONE, URINE: NORMAL
PAIN MANAGEMENT DRUG PANEL INTERP, URINE: NORMAL
PAIN MGT DRUG PANEL, HI RES, UR: NORMAL
PCP,URINE: NORMAL
PHENTERMINE, UR: NORMAL
PREGABALIN: NORMAL
TAPENTADOL, URINE: NORMAL
TAPENTADOL-O-SULFATE, URINE: NORMAL
TEMAZEPAM, URINE: NORMAL
TRAMADOL, URINE: NORMAL
ZOLPIDEM METABOLITE (ZCA), URINE: NORMAL
ZOLPIDEM, URINE: NORMAL

## 2025-04-09 LAB
6-ACETYLMORPHINE, UR: NOT DETECTED
7-AMINOCLONAZEPAM, URINE: NOT DETECTED
ALPHA-OH-ALPRAZ, URINE: NOT DETECTED
ALPHA-OH-MIDAZOLAM, URINE: NOT DETECTED
ALPRAZOLAM, URINE: NOT DETECTED
AMPHETAMINE, URINE: NOT DETECTED
BARBITURATES, URINE: NEGATIVE
BENZOYLECGONINE, UR: NEGATIVE
BUPRENORPHINE URINE: NOT DETECTED
CARISOPRODOL, UR: NEGATIVE
CLONAZEPAM, URINE: NOT DETECTED
CODEINE, URINE: NOT DETECTED
CREAT UR-MCNC: 46.2 MG/DL (ref 20–400)
DIAZEPAM, URINE: NOT DETECTED
DRUGS EXPECTED, UR: NORMAL
EER HI RES INTERP UR: NORMAL
ETHYL GLUCURONIDE UR: NEGATIVE
FENTANYL URINE: NOT DETECTED
GABAPENTIN: PRESENT
HYDROCODONE, URINE: NOT DETECTED
HYDROMORPHONE, URINE: NOT DETECTED
LORAZEPAM, URINE: NOT DETECTED
MARIJUANA METAB, UR: NEGATIVE
MDA, URINE: NOT DETECTED
MDEA, EVE, UR: NOT DETECTED
MDMA, URINE: NOT DETECTED
MEPERIDINE METAB, UR: NOT DETECTED
METHADONE, URINE: NEGATIVE
METHAMPHETAMINE, URINE: NOT DETECTED
METHYLPHENIDATE: NOT DETECTED
MIDAZOLAM, URINE: NOT DETECTED
MORPHINE, OPI1M: NOT DETECTED
NALOXONE URINE: NOT DETECTED
NORBUPRENORPHINE, URINE: NOT DETECTED
NORDIAZEPAM, URINE: NOT DETECTED
NORFENTANYL, URINE: NOT DETECTED
NORHYDROCODONE, URINE: NOT DETECTED
NOROXYCODONE, URINE: NOT DETECTED
NOROXYMORPHONE, URINE: NOT DETECTED
OXAZEPAM, URINE: NOT DETECTED
OXYCODONE URINE: NOT DETECTED
OXYMORPHONE, URINE: NOT DETECTED
PAIN MANAGEMENT DRUG PANEL INTERP, URINE: NORMAL
PAIN MGT DRUG PANEL, HI RES, UR: NORMAL
PCP,URINE: NEGATIVE
PHENTERMINE, UR: NOT DETECTED
PREGABALIN: NOT DETECTED
TAPENTADOL, URINE: NOT DETECTED
TAPENTADOL-O-SULFATE, URINE: NOT DETECTED
TEMAZEPAM, URINE: NOT DETECTED
TRAMADOL, URINE: NORMAL
ZOLPIDEM METABOLITE (ZCA), URINE: NOT DETECTED
ZOLPIDEM, URINE: NOT DETECTED

## 2025-04-10 ENCOUNTER — RESULTS FOLLOW-UP (OUTPATIENT)
Dept: PAIN MANAGEMENT | Age: 50
End: 2025-04-10

## 2025-05-06 DIAGNOSIS — M54.50 CHRONIC BILATERAL LOW BACK PAIN WITHOUT SCIATICA: ICD-10-CM

## 2025-05-06 DIAGNOSIS — M47.817 LUMBOSACRAL SPONDYLOSIS WITHOUT MYELOPATHY: Chronic | ICD-10-CM

## 2025-05-06 DIAGNOSIS — G89.29 CHRONIC BILATERAL LOW BACK PAIN WITHOUT SCIATICA: ICD-10-CM

## 2025-05-08 RX ORDER — TRAMADOL HYDROCHLORIDE 50 MG/1
50 TABLET ORAL EVERY 8 HOURS PRN
Qty: 90 TABLET | Refills: 0 | Status: SHIPPED | OUTPATIENT
Start: 2025-05-08 | End: 2025-06-07

## 2025-06-05 ENCOUNTER — OFFICE VISIT (OUTPATIENT)
Dept: PAIN MANAGEMENT | Age: 50
End: 2025-06-05
Payer: COMMERCIAL

## 2025-06-05 VITALS — BODY MASS INDEX: 38.94 KG/M2 | WEIGHT: 272 LBS | HEIGHT: 70 IN

## 2025-06-05 DIAGNOSIS — Z79.891 CHRONIC USE OF OPIATE DRUG FOR THERAPEUTIC PURPOSE: Primary | ICD-10-CM

## 2025-06-05 DIAGNOSIS — G89.29 CHRONIC BILATERAL LOW BACK PAIN WITHOUT SCIATICA: ICD-10-CM

## 2025-06-05 DIAGNOSIS — M47.817 LUMBOSACRAL SPONDYLOSIS WITHOUT MYELOPATHY: Chronic | ICD-10-CM

## 2025-06-05 DIAGNOSIS — M54.50 CHRONIC BILATERAL LOW BACK PAIN WITHOUT SCIATICA: ICD-10-CM

## 2025-06-05 DIAGNOSIS — Z86.59 HISTORY OF PSYCHIATRIC DISORDER: ICD-10-CM

## 2025-06-05 DIAGNOSIS — M48.061 SPINAL STENOSIS OF LUMBAR REGION WITHOUT NEUROGENIC CLAUDICATION: ICD-10-CM

## 2025-06-05 PROCEDURE — 99213 OFFICE O/P EST LOW 20 MIN: CPT | Performed by: NURSE PRACTITIONER

## 2025-06-05 RX ORDER — TRAMADOL HYDROCHLORIDE 50 MG/1
50 TABLET ORAL EVERY 8 HOURS PRN
Qty: 90 TABLET | Refills: 0 | Status: SHIPPED | OUTPATIENT
Start: 2025-06-07 | End: 2025-07-07

## 2025-06-05 RX ORDER — GABAPENTIN 300 MG/1
300 CAPSULE ORAL EVERY 8 HOURS SCHEDULED
Qty: 270 CAPSULE | Refills: 0 | Status: SHIPPED | OUTPATIENT
Start: 2025-06-05 | End: 2025-09-03

## 2025-06-05 ASSESSMENT — ENCOUNTER SYMPTOMS
SHORTNESS OF BREATH: 0
BOWEL INCONTINENCE: 0
BACK PAIN: 1

## 2025-06-05 NOTE — PROGRESS NOTES
Chief Complaint   Patient presents with    Back Pain    Medication Refill     Tramadol        PMH     Patient is 49-year-old male with complains of low back pain since 0901-9712 with no known injury.   He has has lumbar steroid injections in past but steroids were discontinued because of psychiatric issue.    He is also using TENS unit and OTC lidocaine cream with good relief.   Patient states tramadol and RFA are managing his pain and helping him stay active. Feels well overall.   MRI completed 7/23 Moderate bilateral neural foraminal narrowing at L5-S1, left greater than right, worsened from the previous exam. Mild bilateral neural foraminal narrowing at L4-5, unchanged from the  previous exam. Additional mild multilevel degenerative changes, similar to the previous exam. MRI updated in 9/2024.      Recent lumbar RFA on 2/5/2025 with continued relief. Average back pain score went from 8-3/10 reports improved activity tolerance on multimodal medication regimen with gabapentin, tizanidine and tramadol. He denies side effects from medications.  Denies changes to PM today.     Back Pain  This is a chronic problem. The current episode started more than 1 year ago. The problem occurs constantly. The problem is unchanged. The pain is present in the lumbar spine. The quality of the pain is described as aching, burning, cramping, shooting and stabbing. The pain does not radiate. The pain is at a severity of 3/10. The pain is mild. The pain is Worse during the night. The symptoms are aggravated by bending, position, sitting, standing and twisting. Stiffness is present In the morning and at night. Pertinent negatives include no bladder incontinence, bowel incontinence, chest pain or fever. He has tried analgesics, home exercises and heat for the symptoms.       Patient denies any new neurological symptoms. No bowel or bladder incontinence, no weakness, and no falling.    Pill count: 13 pills with patient -

## 2025-07-07 DIAGNOSIS — M54.50 CHRONIC BILATERAL LOW BACK PAIN WITHOUT SCIATICA: ICD-10-CM

## 2025-07-07 DIAGNOSIS — G89.29 CHRONIC BILATERAL LOW BACK PAIN WITHOUT SCIATICA: ICD-10-CM

## 2025-07-07 DIAGNOSIS — M47.817 LUMBOSACRAL SPONDYLOSIS WITHOUT MYELOPATHY: Chronic | ICD-10-CM

## 2025-07-07 RX ORDER — TRAMADOL HYDROCHLORIDE 50 MG/1
50 TABLET ORAL EVERY 8 HOURS PRN
Qty: 90 TABLET | Refills: 0 | Status: SHIPPED | OUTPATIENT
Start: 2025-07-07 | End: 2025-08-06

## 2025-07-07 NOTE — TELEPHONE ENCOUNTER
Patient last seen on 06/05/25 with follow up pending for 08/01/25. Please advise on medication refill

## 2025-08-01 ENCOUNTER — OFFICE VISIT (OUTPATIENT)
Dept: PAIN MANAGEMENT | Age: 50
End: 2025-08-01

## 2025-08-01 VITALS — HEIGHT: 70 IN | WEIGHT: 272 LBS | BODY MASS INDEX: 38.94 KG/M2

## 2025-08-01 DIAGNOSIS — M54.50 CHRONIC BILATERAL LOW BACK PAIN WITHOUT SCIATICA: ICD-10-CM

## 2025-08-01 DIAGNOSIS — G89.29 CHRONIC BILATERAL LOW BACK PAIN WITHOUT SCIATICA: ICD-10-CM

## 2025-08-01 DIAGNOSIS — Z79.891 CHRONIC USE OF OPIATE DRUG FOR THERAPEUTIC PURPOSE: Primary | ICD-10-CM

## 2025-08-01 DIAGNOSIS — M48.061 SPINAL STENOSIS OF LUMBAR REGION WITHOUT NEUROGENIC CLAUDICATION: ICD-10-CM

## 2025-08-01 DIAGNOSIS — Z86.59 HISTORY OF PSYCHIATRIC DISORDER: ICD-10-CM

## 2025-08-01 DIAGNOSIS — M47.817 LUMBOSACRAL SPONDYLOSIS WITHOUT MYELOPATHY: Chronic | ICD-10-CM

## 2025-08-01 RX ORDER — TRAMADOL HYDROCHLORIDE 50 MG/1
50 TABLET ORAL EVERY 8 HOURS PRN
Qty: 90 TABLET | Refills: 0 | Status: SHIPPED | OUTPATIENT
Start: 2025-08-06 | End: 2025-09-05

## 2025-08-01 ASSESSMENT — ENCOUNTER SYMPTOMS
BACK PAIN: 1
BOWEL INCONTINENCE: 0

## 2025-08-01 NOTE — PROGRESS NOTES
Chief Complaint   Patient presents with    Back Pain     Med refill        PMH     Patient is 49-year-old male with complains of low back pain since [id number] with no known injury.   He has has lumbar steroid injections in past but steroids were discontinued because of psychiatric issue.    He is also using TENS unit and OTC lidocaine cream with good relief.   Patient states tramadol and RFA are managing his pain and helping him stay active. Feels well overall.   MRI completed 7/23 Moderate bilateral neural foraminal narrowing at L5-S1, left greater than right, worsened from the previous exam. Mild bilateral neural foraminal narrowing at L4-5, unchanged from the  previous exam. Additional mild multilevel degenerative changes, similar to the previous exam. MRI updated in 9/2024.      Recent lumbar RFA on 2/5/2025 with continued relief. Average back pain score went from 8-3/10 reports improved activity tolerance on multimodal medication regimen with gabapentin, tizanidine and tramadol. He denies side effects from medications.  Denies changes to [facility] today.     HPI:     Back Pain  This is a chronic problem. The current episode started more than 1 year ago. The problem occurs constantly. The problem is unchanged. The pain is present in the lumbar spine. The pain is at a severity of 3/10. The symptoms are aggravated by bending, sitting and standing. Pertinent negatives include no bladder incontinence, bowel incontinence, fever, headaches, numbness, tingling or weakness. He has tried ice and heat for the symptoms.        Patient denies any new neurological symptoms. No bowel or bladder incontinence, no weakness, and no falling.    Pill count: appropriate: Tramadol/ 23   -due for refill 8/6/2025    Morphine equivalent: 30    Periodic Controlled Substance Monitoring: Possible medication side effects, risk of tolerance/dependence & alternative treatments discussed., No signs of potential drug abuse or diversion

## 2025-09-01 DIAGNOSIS — G89.29 CHRONIC BILATERAL LOW BACK PAIN WITHOUT SCIATICA: ICD-10-CM

## 2025-09-01 DIAGNOSIS — M47.817 LUMBOSACRAL SPONDYLOSIS WITHOUT MYELOPATHY: Chronic | ICD-10-CM

## 2025-09-01 DIAGNOSIS — M54.50 CHRONIC BILATERAL LOW BACK PAIN WITHOUT SCIATICA: ICD-10-CM

## 2025-09-03 RX ORDER — GABAPENTIN 300 MG/1
300 CAPSULE ORAL EVERY 8 HOURS SCHEDULED
Qty: 270 CAPSULE | Refills: 0 | Status: SHIPPED | OUTPATIENT
Start: 2025-09-03 | End: 2025-12-02

## 2025-09-04 DIAGNOSIS — M47.817 LUMBOSACRAL SPONDYLOSIS WITHOUT MYELOPATHY: Chronic | ICD-10-CM

## 2025-09-04 DIAGNOSIS — M54.50 CHRONIC BILATERAL LOW BACK PAIN WITHOUT SCIATICA: ICD-10-CM

## 2025-09-04 DIAGNOSIS — G89.29 CHRONIC BILATERAL LOW BACK PAIN WITHOUT SCIATICA: ICD-10-CM

## 2025-09-04 RX ORDER — TRAMADOL HYDROCHLORIDE 50 MG/1
50 TABLET ORAL EVERY 8 HOURS PRN
Qty: 90 TABLET | Refills: 0 | Status: SHIPPED | OUTPATIENT
Start: 2025-09-04 | End: 2025-10-04

## (undated) DEVICE — CANNULA IV 18GA L15IN BLNT FILL LUERLOCK HUB MJCT

## (undated) DEVICE — SINGLE DOSE EPI TY

## (undated) DEVICE — Device

## (undated) DEVICE — PAD ELECTROSURG HRVSTR CORD PATIENT

## (undated) DEVICE — CANNULA NSL AD L7FT O2 PRSS CRUSH RESIST TBNG M CONN AIRLFE

## (undated) DEVICE — GLOVE SURG SZ 75 CRM LTX FREE POLYISOPRENE POLYMER BEAD ANTI

## (undated) DEVICE — GLOVE SURG SZ 8 THK118MIL BLK LTX FREE POLYISOPRENE BEAD CUF

## (undated) DEVICE — GLOVE SURG SZ 75 L12IN FNGR THK87MIL WHT LTX FREE

## (undated) DEVICE — TOWEL,OR,DSP,ST,BLUE,STD,6/PK,12PK/CS: Brand: MEDLINE

## (undated) DEVICE — Z DISCONTINUED APPLICATOR SURG PREP 0.35OZ 2% CHG 70% ISO ALC W/ HI LT

## (undated) DEVICE — BANDAGE ADH W1XL3IN UNIV PLAS NAT GEN USE STRP

## (undated) DEVICE — TOWEL,OR,DSP,ST,BLUE,DLX,XR,4/PK,20PK/CS: Brand: MEDLINE

## (undated) DEVICE — ZINACTIVE USE 2537982 PAD GRND FOR RF PAIN MGMT DISP

## (undated) DEVICE — 1010 S-DRAPE TOWEL DRAPE 10/BX: Brand: STERI-DRAPE™

## (undated) DEVICE — TOWEL,OR,DSP,ST,BLUE,STD,4/PK,20PK/CS: Brand: MEDLINE

## (undated) DEVICE — SINGLE SHOT EPIDURAL TRAY: Brand: MEDLINE INDUSTRIES, INC.

## (undated) DEVICE — TRAY NRV BLK SUPP CUST

## (undated) DEVICE — NEEDLE SPNL 22GA L5IN BLK HUB S STL W/ QNCKE PNT W/OUT

## (undated) DEVICE — CURVED SHARP RF CANNULA, RADIOPAQUE MARKER: Brand: RADIOPAQUE RADIOFREQUENCY CANNULA

## (undated) DEVICE — SKIN MARKER,REGULAR TIP WITH RULER AND LABELS: Brand: DEVON